# Patient Record
Sex: FEMALE | Race: WHITE | NOT HISPANIC OR LATINO | Employment: UNEMPLOYED | URBAN - METROPOLITAN AREA
[De-identification: names, ages, dates, MRNs, and addresses within clinical notes are randomized per-mention and may not be internally consistent; named-entity substitution may affect disease eponyms.]

---

## 2023-03-07 PROBLEM — H90.A22 SENSORINEURAL HEARING LOSS (SNHL) OF LEFT EAR WITH RESTRICTED HEARING OF RIGHT EAR: Status: ACTIVE | Noted: 2023-03-07

## 2023-03-07 PROBLEM — L65.9 HAIR LOSS: Status: ACTIVE | Noted: 2023-03-07

## 2023-03-07 PROBLEM — G57.11 MERALGIA PARESTHETICA OF RIGHT SIDE: Status: ACTIVE | Noted: 2023-03-07

## 2023-03-07 PROBLEM — M79.10 MUSCLE PAIN: Status: ACTIVE | Noted: 2023-03-07

## 2023-03-07 PROBLEM — E66.9 CLASS 2 OBESITY WITH BODY MASS INDEX (BMI) OF 37.0 TO 37.9 IN ADULT: Status: ACTIVE | Noted: 2023-03-07

## 2023-03-07 PROBLEM — N94.10 DYSPAREUNIA IN FEMALE: Status: ACTIVE | Noted: 2023-03-07

## 2023-03-07 PROBLEM — R19.7 DIARRHEA: Status: ACTIVE | Noted: 2023-03-07

## 2023-03-07 PROBLEM — E66.9 OBESITY, CLASS II, BMI 35-39.9: Status: ACTIVE | Noted: 2023-03-07

## 2023-03-07 PROBLEM — G45.9 TRANSIENT CEREBRAL ISCHEMIA: Status: ACTIVE | Noted: 2023-03-07

## 2023-03-07 PROBLEM — R53.83 FATIGUE: Status: ACTIVE | Noted: 2023-03-07

## 2023-03-07 PROBLEM — N94.6 DYSMENORRHEA: Status: ACTIVE | Noted: 2023-03-07

## 2023-03-07 PROBLEM — R74.8 ELEVATED LIVER ENZYMES: Status: ACTIVE | Noted: 2023-03-07

## 2023-03-07 PROBLEM — M25.461 EFFUSION OF RIGHT KNEE: Status: ACTIVE | Noted: 2023-03-07

## 2023-03-07 PROBLEM — D68.00 VON WILLEBRAND DISEASE (MULTI): Status: ACTIVE | Noted: 2023-03-07

## 2023-03-07 PROBLEM — R63.5 WEIGHT GAIN: Status: ACTIVE | Noted: 2023-03-07

## 2023-03-07 PROBLEM — G47.00 INSOMNIA: Status: ACTIVE | Noted: 2023-03-07

## 2023-03-07 PROBLEM — K76.0 NONALCOHOLIC FATTY LIVER DISEASE: Status: ACTIVE | Noted: 2023-03-07

## 2023-03-07 PROBLEM — R31.0 GROSS HEMATURIA: Status: ACTIVE | Noted: 2023-03-07

## 2023-03-07 PROBLEM — N28.9 KIDNEY DYSFUNCTION: Status: ACTIVE | Noted: 2023-03-07

## 2023-03-07 PROBLEM — H91.91 HEARING LOSS ON RIGHT: Status: ACTIVE | Noted: 2023-03-07

## 2023-03-07 PROBLEM — H90.A31 MIXED CONDUCTIVE AND SENSORINEURAL HEARING LOSS OF RIGHT EAR WITH RESTRICTED HEARING OF LEFT EAR: Status: ACTIVE | Noted: 2023-03-07

## 2023-03-07 PROBLEM — E04.9 ENLARGED THYROID: Status: ACTIVE | Noted: 2023-03-07

## 2023-03-07 PROBLEM — E66.812 CLASS 2 OBESITY WITH BODY MASS INDEX (BMI) OF 37.0 TO 37.9 IN ADULT: Status: ACTIVE | Noted: 2023-03-07

## 2023-03-07 PROBLEM — M22.2X1 PATELLOFEMORAL PAIN SYNDROME OF RIGHT KNEE: Status: ACTIVE | Noted: 2023-03-07

## 2023-03-07 PROBLEM — E28.2 PCOS (POLYCYSTIC OVARIAN SYNDROME): Status: ACTIVE | Noted: 2023-03-07

## 2023-03-07 PROBLEM — E78.5 HYPERLIPIDEMIA: Status: ACTIVE | Noted: 2023-03-07

## 2023-03-07 PROBLEM — E66.9 OBESITY: Status: ACTIVE | Noted: 2023-03-07

## 2023-03-07 PROBLEM — R74.8 ABNORMAL LIVER ENZYMES: Status: ACTIVE | Noted: 2023-03-07

## 2023-03-07 PROBLEM — E66.811 CLASS 1 OBESITY WITH BODY MASS INDEX (BMI) OF 34.0 TO 34.9 IN ADULT: Status: ACTIVE | Noted: 2023-03-07

## 2023-03-07 PROBLEM — S89.91XA INJURY OF RIGHT KNEE: Status: ACTIVE | Noted: 2023-03-07

## 2023-03-07 PROBLEM — M54.16 LUMBAR RADICULOPATHY, ACUTE: Status: ACTIVE | Noted: 2023-03-07

## 2023-03-07 PROBLEM — G43.909 MIGRAINES: Status: ACTIVE | Noted: 2023-03-07

## 2023-03-07 PROBLEM — M54.32 SCIATICA OF LEFT SIDE: Status: ACTIVE | Noted: 2023-03-07

## 2023-03-07 PROBLEM — G62.9 SENSORY NEUROPATHY: Status: ACTIVE | Noted: 2023-03-07

## 2023-03-07 PROBLEM — N92.6 IRREGULAR MENSTRUAL CYCLE: Status: ACTIVE | Noted: 2023-03-07

## 2023-03-07 PROBLEM — E04.1 THYROID NODULE: Status: ACTIVE | Noted: 2023-03-07

## 2023-03-07 PROBLEM — E66.9 CLASS 1 OBESITY WITH BODY MASS INDEX (BMI) OF 34.0 TO 34.9 IN ADULT: Status: ACTIVE | Noted: 2023-03-07

## 2023-03-07 PROBLEM — E66.812 OBESITY, CLASS II, BMI 35-39.9: Status: ACTIVE | Noted: 2023-03-07

## 2023-03-07 PROBLEM — E55.9 VITAMIN D DEFICIENCY: Status: ACTIVE | Noted: 2023-03-07

## 2023-03-07 PROBLEM — R63.4 WEIGHT LOSS: Status: ACTIVE | Noted: 2023-03-07

## 2023-03-07 PROBLEM — R39.89 POSSIBLE URINARY TRACT INFECTION: Status: ACTIVE | Noted: 2023-03-07

## 2023-03-07 PROBLEM — R10.2 PELVIC PAIN: Status: ACTIVE | Noted: 2023-03-07

## 2023-03-07 PROBLEM — G47.33 OSA (OBSTRUCTIVE SLEEP APNEA): Status: ACTIVE | Noted: 2023-03-07

## 2023-03-07 PROBLEM — J34.89 SINUS PRESSURE: Status: ACTIVE | Noted: 2023-03-07

## 2023-03-07 PROBLEM — M25.561 KNEE PAIN, RIGHT: Status: ACTIVE | Noted: 2023-03-07

## 2023-03-07 PROBLEM — H93.13 SUBJECTIVE TINNITUS OF BOTH EARS: Status: ACTIVE | Noted: 2023-03-07

## 2023-03-07 PROBLEM — G57.12 MERALGIA PARESTHETICA, LEFT: Status: ACTIVE | Noted: 2023-03-07

## 2023-03-07 PROBLEM — H92.03 OTALGIA OF BOTH EARS: Status: ACTIVE | Noted: 2023-03-07

## 2023-03-07 PROBLEM — N92.1 MENORRHAGIA WITH IRREGULAR CYCLE: Status: ACTIVE | Noted: 2023-03-07

## 2023-03-07 PROBLEM — R06.83 SNORING: Status: ACTIVE | Noted: 2023-03-07

## 2023-03-07 RX ORDER — NORETHINDRONE 0.35 MG/1
TABLET ORAL
COMMUNITY
Start: 2022-04-04 | End: 2023-07-18 | Stop reason: ALTCHOICE

## 2023-03-07 RX ORDER — MAGNESIUM 200 MG
1 TABLET ORAL NIGHTLY
COMMUNITY
Start: 2021-12-17 | End: 2023-07-18 | Stop reason: ALTCHOICE

## 2023-03-07 RX ORDER — METFORMIN HYDROCHLORIDE 500 MG/1
1 TABLET ORAL 2 TIMES DAILY
COMMUNITY
Start: 2022-03-03 | End: 2023-07-18 | Stop reason: ALTCHOICE

## 2023-03-07 RX ORDER — SUMATRIPTAN SUCCINATE 100 MG/1
TABLET ORAL
COMMUNITY
Start: 2017-11-10 | End: 2023-07-18 | Stop reason: ALTCHOICE

## 2023-03-07 RX ORDER — ALBUTEROL SULFATE 90 UG/1
AEROSOL, METERED RESPIRATORY (INHALATION)
COMMUNITY
Start: 2019-09-19 | End: 2024-03-08 | Stop reason: SDUPTHER

## 2023-03-07 RX ORDER — ERGOCALCIFEROL 1.25 MG/1
1 CAPSULE ORAL
COMMUNITY
Start: 2021-06-28 | End: 2024-01-05 | Stop reason: SDUPTHER

## 2023-03-07 RX ORDER — IBUPROFEN 800 MG/1
TABLET ORAL
COMMUNITY
Start: 2021-12-17 | End: 2023-07-18 | Stop reason: ALTCHOICE

## 2023-03-08 ENCOUNTER — APPOINTMENT (OUTPATIENT)
Dept: PRIMARY CARE | Facility: CLINIC | Age: 33
End: 2023-03-08
Payer: COMMERCIAL

## 2023-03-09 ENCOUNTER — OFFICE VISIT (OUTPATIENT)
Dept: PRIMARY CARE | Facility: CLINIC | Age: 33
End: 2023-03-09
Payer: COMMERCIAL

## 2023-03-09 VITALS
BODY MASS INDEX: 37.17 KG/M2 | HEIGHT: 62 IN | DIASTOLIC BLOOD PRESSURE: 76 MMHG | WEIGHT: 202 LBS | SYSTOLIC BLOOD PRESSURE: 122 MMHG

## 2023-03-09 DIAGNOSIS — R35.0 URINARY FREQUENCY: ICD-10-CM

## 2023-03-09 LAB
BACTERIA, URINE: ABNORMAL /HPF
MUCUS, URINE: ABNORMAL /LPF
POC APPEARANCE, URINE: ABNORMAL
POC BILIRUBIN, URINE: NEGATIVE
POC BLOOD, URINE: ABNORMAL
POC COLOR, URINE: ABNORMAL
POC GLUCOSE, URINE: NEGATIVE MG/DL
POC KETONES, URINE: NEGATIVE MG/DL
POC LEUKOCYTES, URINE: ABNORMAL
POC NITRITE,URINE: NEGATIVE
POC PH, URINE: 6.5 PH
POC PROTEIN, URINE: NEGATIVE MG/DL
POC SPECIFIC GRAVITY, URINE: 1.01
POC UROBILINOGEN, URINE: 2 EU/DL
RBC, URINE: 100 /HPF (ref 0–5)
SQUAMOUS EPITHELIAL CELLS, URINE: 7 /HPF
WBC, URINE: 22 /HPF (ref 0–5)

## 2023-03-09 PROCEDURE — 87186 SC STD MICRODIL/AGAR DIL: CPT

## 2023-03-09 PROCEDURE — 81002 URINALYSIS NONAUTO W/O SCOPE: CPT | Performed by: INTERNAL MEDICINE

## 2023-03-09 PROCEDURE — 81001 URINALYSIS AUTO W/SCOPE: CPT

## 2023-03-09 PROCEDURE — 99213 OFFICE O/P EST LOW 20 MIN: CPT | Performed by: INTERNAL MEDICINE

## 2023-03-09 PROCEDURE — 87086 URINE CULTURE/COLONY COUNT: CPT

## 2023-03-09 PROCEDURE — 87077 CULTURE AEROBIC IDENTIFY: CPT

## 2023-03-09 RX ORDER — CIPROFLOXACIN 500 MG/1
500 TABLET ORAL 2 TIMES DAILY
Qty: 10 TABLET | Refills: 0 | Status: SHIPPED | OUTPATIENT
Start: 2023-03-09 | End: 2023-03-14

## 2023-03-09 ASSESSMENT — ENCOUNTER SYMPTOMS: FREQUENCY: 1

## 2023-03-10 NOTE — PROGRESS NOTES
"Subjective   Patient ID: Breanna Jackson is a 32 y.o. female who presents for Urinary Frequency (Pain/pressure).    Urinary Frequency   Associated symptoms include frequency.   Patient is here with complaints of having urinary frequency and urgency for last 2 days.  Denies any back pain.  She is noticing blood off-and-on but she is also spotting off and on since January        Past recap  Patient is here for presurgical clearance  She is going for right knee arthroscopic surgery  In August she stepped wrong and torn knee ACL     Past medical history: Ureteric stone, wisdom tooth removed, allergies  Social history:  non-smoker nondrinker 4 pregnancies 3 kids  Occupation: Stay home mom  Family history: Mother with von Willebrand's disease ovarian and breast cancer and endometriosis     Review of Systems   Genitourinary:  Positive for frequency.       Objective   /76   Ht 1.575 m (5' 2\")   Wt 91.6 kg (202 lb)   BMI 36.95 kg/m²     Physical Exam  Vitals reviewed.   Constitutional:       Appearance: Normal appearance.   HENT:      Head: Normocephalic and atraumatic.      Right Ear: Tympanic membrane, ear canal and external ear normal.      Left Ear: Tympanic membrane, ear canal and external ear normal.      Nose: Nose normal.      Mouth/Throat:      Pharynx: Oropharynx is clear.   Eyes:      Extraocular Movements: Extraocular movements intact.      Conjunctiva/sclera: Conjunctivae normal.      Pupils: Pupils are equal, round, and reactive to light.   Cardiovascular:      Rate and Rhythm: Normal rate and regular rhythm.      Pulses: Normal pulses.      Heart sounds: Normal heart sounds.   Pulmonary:      Effort: Pulmonary effort is normal.      Breath sounds: Normal breath sounds.   Abdominal:      General: Abdomen is flat. Bowel sounds are normal.      Palpations: Abdomen is soft.   Musculoskeletal:      Cervical back: Normal range of motion and neck supple.   Skin:     General: Skin is warm and dry. "   Neurological:      General: No focal deficit present.      Mental Status: She is alert and oriented to person, place, and time.   Psychiatric:         Mood and Affect: Mood normal.         Assessment/Plan   Problem List Items Addressed This Visit    None  Visit Diagnoses       Urinary frequency        Relevant Medications    ciprofloxacin (Cipro) 500 mg tablet    Other Relevant Orders    POCT UA (nonautomated) manually resulted (Completed)    Urinalysis with Reflex Microscopic and Culture          Urine analysis shows signs of UTI  We will treat with Cipro while waiting for culture  If menstrual irregularities are not better advised to follow-up with OB/GYN

## 2023-03-11 LAB — URINE CULTURE: ABNORMAL

## 2023-07-18 ENCOUNTER — OFFICE VISIT (OUTPATIENT)
Dept: PRIMARY CARE | Facility: CLINIC | Age: 33
End: 2023-07-18
Payer: COMMERCIAL

## 2023-07-18 VITALS — WEIGHT: 202 LBS | BODY MASS INDEX: 37.17 KG/M2 | HEIGHT: 62 IN

## 2023-07-18 DIAGNOSIS — E55.9 VITAMIN D DEFICIENCY: ICD-10-CM

## 2023-07-18 DIAGNOSIS — E04.9 ENLARGED THYROID: ICD-10-CM

## 2023-07-18 DIAGNOSIS — Z00.00 ROUTINE HEALTH MAINTENANCE: ICD-10-CM

## 2023-07-18 DIAGNOSIS — E78.49 OTHER HYPERLIPIDEMIA: ICD-10-CM

## 2023-07-18 PROCEDURE — 99395 PREV VISIT EST AGE 18-39: CPT | Performed by: INTERNAL MEDICINE

## 2023-07-18 RX ORDER — NORETHINDRONE 0.35 MG/1
1 TABLET ORAL DAILY
Qty: 28 TABLET | Refills: 2 | Status: SHIPPED | OUTPATIENT
Start: 2023-07-18 | End: 2023-10-04 | Stop reason: SDUPTHER

## 2023-07-18 NOTE — PROGRESS NOTES
"Subjective   Patient ID: Breanna Jackson is a 33 y.o. female who presents for cpe.    HPI   Patient is here for physical  Also would like birth control prescription until she can see the OB/GYN  She was recommended to ice reviewed the for heavy menstrual cycle but patient is not willing to do it     past recap  Patient is here for presurgical clearance  She is going for right knee arthroscopic surgery  In August she stepped wrong and torn knee ACL     Past medical history: Ureteric stone, wisdom tooth removed, allergies  Social history:  non-smoker nondrinker 4 pregnancies 3 kids  Occupation: Stay home mom  Family history: Mother with von Willebrand's disease ovarian and breast cancer and endometriosis   Review of Systems    Objective   Ht 1.575 m (5' 2\")   Wt 91.6 kg (202 lb)   BMI 36.95 kg/m²     Physical Exam  Vitals reviewed.   Constitutional:       Appearance: Normal appearance.   HENT:      Head: Normocephalic and atraumatic.      Right Ear: Tympanic membrane, ear canal and external ear normal.      Left Ear: Tympanic membrane, ear canal and external ear normal.      Nose: Nose normal.      Mouth/Throat:      Pharynx: Oropharynx is clear.   Eyes:      Extraocular Movements: Extraocular movements intact.      Conjunctiva/sclera: Conjunctivae normal.      Pupils: Pupils are equal, round, and reactive to light.   Cardiovascular:      Rate and Rhythm: Normal rate and regular rhythm.      Pulses: Normal pulses.      Heart sounds: Normal heart sounds.   Pulmonary:      Effort: Pulmonary effort is normal.      Breath sounds: Normal breath sounds.   Abdominal:      General: Abdomen is flat. Bowel sounds are normal.      Palpations: Abdomen is soft.   Musculoskeletal:      Cervical back: Normal range of motion and neck supple.   Skin:     General: Skin is warm and dry.   Neurological:      General: No focal deficit present.      Mental Status: She is alert and oriented to person, place, and time.   Psychiatric:  "        Mood and Affect: Mood normal.         Assessment/Plan   Problem List Items Addressed This Visit          Cardiac and Vasculature    Hyperlipidemia    Relevant Orders    CBC    Comprehensive Metabolic Panel    Lipid Panel    Thyroid Stimulating Hormone    Vitamin D, Total       Endocrine/Metabolic    Enlarged thyroid    Relevant Orders    CBC    Comprehensive Metabolic Panel    Lipid Panel    Thyroid Stimulating Hormone    Vitamin D, Total    Vitamin D deficiency    Relevant Orders    CBC    Comprehensive Metabolic Panel    Lipid Panel    Thyroid Stimulating Hormone    Vitamin D, Total     Other Visit Diagnoses       Routine health maintenance        Relevant Medications    norethindrone (Makenzie) 0.35 mg tablet    Other Relevant Orders    CBC    Comprehensive Metabolic Panel    Lipid Panel    Thyroid Stimulating Hormone    Vitamin D, Total        Physical normal  Routine blood work ordered  OB/GYN notes reviewed  Norethindrone 0.35 mg p.o. daily 3-month supply

## 2023-10-04 DIAGNOSIS — Z00.00 ROUTINE HEALTH MAINTENANCE: ICD-10-CM

## 2023-10-04 RX ORDER — NORETHINDRONE 0.35 MG/1
1 TABLET ORAL DAILY
Qty: 84 TABLET | Refills: 0 | Status: SHIPPED | OUTPATIENT
Start: 2023-10-04 | End: 2024-01-02 | Stop reason: SDUPTHER

## 2023-10-23 ENCOUNTER — APPOINTMENT (OUTPATIENT)
Dept: OBSTETRICS AND GYNECOLOGY | Facility: CLINIC | Age: 33
End: 2023-10-23
Payer: COMMERCIAL

## 2023-12-27 ENCOUNTER — TELEPHONE (OUTPATIENT)
Dept: OBSTETRICS AND GYNECOLOGY | Facility: CLINIC | Age: 33
End: 2023-12-27
Payer: COMMERCIAL

## 2023-12-27 DIAGNOSIS — Z00.00 ROUTINE HEALTH MAINTENANCE: ICD-10-CM

## 2024-01-02 RX ORDER — NORETHINDRONE 0.35 MG/1
1 TABLET ORAL DAILY
Qty: 84 TABLET | Refills: 0 | Status: SHIPPED | OUTPATIENT
Start: 2024-01-02 | End: 2024-01-25 | Stop reason: SDUPTHER

## 2024-01-05 ENCOUNTER — OFFICE VISIT (OUTPATIENT)
Dept: PRIMARY CARE | Facility: CLINIC | Age: 34
End: 2024-01-05
Payer: MEDICAID

## 2024-01-05 VITALS
HEIGHT: 62 IN | WEIGHT: 187 LBS | DIASTOLIC BLOOD PRESSURE: 78 MMHG | SYSTOLIC BLOOD PRESSURE: 116 MMHG | BODY MASS INDEX: 34.41 KG/M2

## 2024-01-05 DIAGNOSIS — E55.9 VITAMIN D DEFICIENCY: ICD-10-CM

## 2024-01-05 DIAGNOSIS — N92.1 MENORRHAGIA WITH IRREGULAR CYCLE: Primary | ICD-10-CM

## 2024-01-05 PROCEDURE — 1036F TOBACCO NON-USER: CPT | Performed by: INTERNAL MEDICINE

## 2024-01-05 PROCEDURE — 99213 OFFICE O/P EST LOW 20 MIN: CPT | Performed by: INTERNAL MEDICINE

## 2024-01-05 RX ORDER — ERGOCALCIFEROL 1.25 MG/1
1 CAPSULE ORAL
Qty: 12 CAPSULE | Refills: 0 | Status: SHIPPED | OUTPATIENT
Start: 2024-01-05 | End: 2024-01-15 | Stop reason: SDUPTHER

## 2024-01-05 ASSESSMENT — ENCOUNTER SYMPTOMS
OCCASIONAL FEELINGS OF UNSTEADINESS: 0
LOSS OF SENSATION IN FEET: 0
DEPRESSION: 0

## 2024-01-05 NOTE — PROGRESS NOTES
"Subjective   Patient ID: Breanna Jackson is a 33 y.o. female who presents for No chief complaint on file..    HPI   Patient is here for follow-up.  Did not do blood work.  Needs refill on controlled     patient is here for physical  Also would like birth control prescription until she can see the OB/GYN  She was recommended to do uterine ablation the for heavy menstrual cycle but patient is not willing to do it     past recap  Patient is here for presurgical clearance  She is going for right knee arthroscopic surgery  In August she stepped wrong and torn knee ACL     Past medical history: Ureteric stone, wisdom tooth removed, allergies  Social history:  non-smoker nondrinker 4 pregnancies 3 kids  Occupation: Stay home mom  Family history: Mother with von Willebrand's disease ovarian and breast cancer and endometriosis   Review of Systems    Objective   /78   Ht 1.575 m (5' 2\")   Wt 84.8 kg (187 lb)   BMI 34.20 kg/m²     Physical Exam  Vitals reviewed.   Constitutional:       Appearance: Normal appearance.   HENT:      Head: Normocephalic and atraumatic.      Right Ear: Tympanic membrane, ear canal and external ear normal.      Left Ear: Tympanic membrane, ear canal and external ear normal.      Nose: Nose normal.      Mouth/Throat:      Pharynx: Oropharynx is clear.   Eyes:      Extraocular Movements: Extraocular movements intact.      Conjunctiva/sclera: Conjunctivae normal.      Pupils: Pupils are equal, round, and reactive to light.   Cardiovascular:      Rate and Rhythm: Normal rate and regular rhythm.      Pulses: Normal pulses.      Heart sounds: Normal heart sounds.   Pulmonary:      Effort: Pulmonary effort is normal.      Breath sounds: Normal breath sounds.   Abdominal:      General: Abdomen is flat. Bowel sounds are normal.      Palpations: Abdomen is soft.   Musculoskeletal:      Cervical back: Normal range of motion and neck supple.   Skin:     General: Skin is warm and dry. "   Neurological:      General: No focal deficit present.      Mental Status: She is alert and oriented to person, place, and time.   Psychiatric:         Mood and Affect: Mood normal.         Assessment/Plan   Problem List Items Addressed This Visit          Endocrine/Metabolic    Vitamin D deficiency    Relevant Medications    ergocalciferol (Vitamin D-2) 1.25 MG (97773 UT) capsule       Genitourinary and Reproductive    Menorrhagia with irregular cycle - Primary   Past recap  physical normal  Routine blood work ordered  OB/GYN notes reviewed  Norethindrone 0.35 mg p.o. daily 3-month supply    1/5/2024  Blood work patient has not done yet blood work ordered  Vitamin D prescription based on results  Refills given on birth control

## 2024-01-06 ENCOUNTER — LAB (OUTPATIENT)
Dept: LAB | Facility: LAB | Age: 34
End: 2024-01-06
Payer: MEDICAID

## 2024-01-06 DIAGNOSIS — Z00.00 ROUTINE HEALTH MAINTENANCE: ICD-10-CM

## 2024-01-06 DIAGNOSIS — E55.9 VITAMIN D DEFICIENCY: ICD-10-CM

## 2024-01-06 DIAGNOSIS — E78.49 OTHER HYPERLIPIDEMIA: ICD-10-CM

## 2024-01-06 DIAGNOSIS — E04.9 ENLARGED THYROID: ICD-10-CM

## 2024-01-06 LAB
25(OH)D3 SERPL-MCNC: 22 NG/ML (ref 30–100)
ALBUMIN SERPL BCP-MCNC: 4.6 G/DL (ref 3.4–5)
ALP SERPL-CCNC: 58 U/L (ref 33–110)
ALT SERPL W P-5'-P-CCNC: 20 U/L (ref 7–45)
ANION GAP SERPL CALC-SCNC: 12 MMOL/L (ref 10–20)
AST SERPL W P-5'-P-CCNC: 17 U/L (ref 9–39)
BILIRUB SERPL-MCNC: 0.6 MG/DL (ref 0–1.2)
BUN SERPL-MCNC: 10 MG/DL (ref 6–23)
CALCIUM SERPL-MCNC: 9.8 MG/DL (ref 8.6–10.6)
CHLORIDE SERPL-SCNC: 103 MMOL/L (ref 98–107)
CHOLEST SERPL-MCNC: 201 MG/DL (ref 0–199)
CHOLESTEROL/HDL RATIO: 4.7
CO2 SERPL-SCNC: 28 MMOL/L (ref 21–32)
CREAT SERPL-MCNC: 0.89 MG/DL (ref 0.5–1.05)
ERYTHROCYTE [DISTWIDTH] IN BLOOD BY AUTOMATED COUNT: 12.8 % (ref 11.5–14.5)
GFR SERPL CREATININE-BSD FRML MDRD: 88 ML/MIN/1.73M*2
GLUCOSE SERPL-MCNC: 100 MG/DL (ref 74–99)
HCT VFR BLD AUTO: 44.3 % (ref 36–46)
HDLC SERPL-MCNC: 42.4 MG/DL
HGB BLD-MCNC: 14.4 G/DL (ref 12–16)
LDLC SERPL CALC-MCNC: 126 MG/DL
MCH RBC QN AUTO: 28.3 PG (ref 26–34)
MCHC RBC AUTO-ENTMCNC: 32.5 G/DL (ref 32–36)
MCV RBC AUTO: 87 FL (ref 80–100)
NON HDL CHOLESTEROL: 159 MG/DL (ref 0–149)
NRBC BLD-RTO: 0 /100 WBCS (ref 0–0)
PLATELET # BLD AUTO: 291 X10*3/UL (ref 150–450)
POTASSIUM SERPL-SCNC: 4.2 MMOL/L (ref 3.5–5.3)
PROT SERPL-MCNC: 7.4 G/DL (ref 6.4–8.2)
RBC # BLD AUTO: 5.09 X10*6/UL (ref 4–5.2)
SODIUM SERPL-SCNC: 139 MMOL/L (ref 136–145)
TRIGL SERPL-MCNC: 162 MG/DL (ref 0–149)
TSH SERPL-ACNC: 2.65 MIU/L (ref 0.44–3.98)
VLDL: 32 MG/DL (ref 0–40)
WBC # BLD AUTO: 5.9 X10*3/UL (ref 4.4–11.3)

## 2024-01-06 PROCEDURE — 36415 COLL VENOUS BLD VENIPUNCTURE: CPT

## 2024-01-06 PROCEDURE — 84443 ASSAY THYROID STIM HORMONE: CPT

## 2024-01-06 PROCEDURE — 80053 COMPREHEN METABOLIC PANEL: CPT

## 2024-01-06 PROCEDURE — 80061 LIPID PANEL: CPT

## 2024-01-06 PROCEDURE — 85027 COMPLETE CBC AUTOMATED: CPT

## 2024-01-06 PROCEDURE — 82306 VITAMIN D 25 HYDROXY: CPT

## 2024-01-15 ENCOUNTER — OFFICE VISIT (OUTPATIENT)
Dept: PRIMARY CARE | Facility: CLINIC | Age: 34
End: 2024-01-15
Payer: MEDICAID

## 2024-01-15 VITALS
DIASTOLIC BLOOD PRESSURE: 78 MMHG | WEIGHT: 187 LBS | SYSTOLIC BLOOD PRESSURE: 116 MMHG | HEIGHT: 62 IN | BODY MASS INDEX: 34.41 KG/M2

## 2024-01-15 DIAGNOSIS — E78.5 HYPERLIPIDEMIA, UNSPECIFIED HYPERLIPIDEMIA TYPE: ICD-10-CM

## 2024-01-15 DIAGNOSIS — E55.9 VITAMIN D DEFICIENCY: ICD-10-CM

## 2024-01-15 PROCEDURE — 99213 OFFICE O/P EST LOW 20 MIN: CPT | Performed by: INTERNAL MEDICINE

## 2024-01-15 PROCEDURE — 1036F TOBACCO NON-USER: CPT | Performed by: INTERNAL MEDICINE

## 2024-01-15 RX ORDER — ERGOCALCIFEROL 1.25 MG/1
1 CAPSULE ORAL
Qty: 4 CAPSULE | Refills: 11 | Status: SHIPPED | OUTPATIENT
Start: 2024-01-15

## 2024-01-15 NOTE — PROGRESS NOTES
"Subjective   Patient ID: Breanna Jackson is a 33 y.o. female who presents for Follow-up (results).    HPI   Patient is here for follow-up on blood work  Follow-up: Vitamin D refills     patient is here for follow-up.  Did not do blood work.  Needs refill on controlled     patient is here for physical  Also would like birth control prescription until she can see the OB/GYN  She was recommended to do uterine ablation the for heavy menstrual cycle but patient is not willing to do it     past recap  Patient is here for presurgical clearance  She is going for right knee arthroscopic surgery  In August she stepped wrong and torn knee ACL     Past medical history: Ureteric stone, wisdom tooth removed, allergies  Social history:  non-smoker nondrinker 4 pregnancies 3 kids  Occupation: Stay home mom  Family history: Mother with von Willebrand's disease ovarian and breast cancer and endometriosis ,a fib, dont know fatherbrian  of MI young  Review of Systems    Objective   /78   Ht 1.575 m (5' 2\")   Wt 84.8 kg (187 lb)   BMI 34.20 kg/m²     Physical Exam  Vitals reviewed.   Constitutional:       Appearance: Normal appearance.   HENT:      Head: Normocephalic and atraumatic.      Right Ear: Tympanic membrane, ear canal and external ear normal.      Left Ear: Tympanic membrane, ear canal and external ear normal.      Nose: Nose normal.      Mouth/Throat:      Pharynx: Oropharynx is clear.   Eyes:      Extraocular Movements: Extraocular movements intact.      Conjunctiva/sclera: Conjunctivae normal.      Pupils: Pupils are equal, round, and reactive to light.   Cardiovascular:      Rate and Rhythm: Normal rate and regular rhythm.      Pulses: Normal pulses.      Heart sounds: Normal heart sounds.   Pulmonary:      Effort: Pulmonary effort is normal.      Breath sounds: Normal breath sounds.   Abdominal:      General: Abdomen is flat. Bowel sounds are normal.      Palpations: Abdomen is soft. "   Musculoskeletal:      Cervical back: Normal range of motion and neck supple.   Skin:     General: Skin is warm and dry.   Neurological:      General: No focal deficit present.      Mental Status: She is alert and oriented to person, place, and time.   Psychiatric:         Mood and Affect: Mood normal.       Assessment/Plan   Problem List Items Addressed This Visit    None  Past recap  physical normal  Routine blood work ordered  OB/GYN notes reviewed  Norethindrone 0.35 mg p.o. daily 3-month supply    1/5/2024  Blood work patient has not done yet blood work ordered  Vitamin D prescription based on results  Refills given on birth control    1/15/2024  Blood work reviewed  Vitamin D still low at  50,000 q. weekly for 1 year  Cholesterol has gone up  Discussed diet and exercise  Patient wants to try that before going on medication  Patient could be having issues with his family history making his cholesterol go up  With still high  Will consider statin  Follow-up blood work in 6 months

## 2024-01-25 ENCOUNTER — OFFICE VISIT (OUTPATIENT)
Dept: OBSTETRICS AND GYNECOLOGY | Facility: CLINIC | Age: 34
End: 2024-01-25
Payer: MEDICAID

## 2024-01-25 VITALS
BODY MASS INDEX: 34.23 KG/M2 | SYSTOLIC BLOOD PRESSURE: 124 MMHG | WEIGHT: 186 LBS | DIASTOLIC BLOOD PRESSURE: 78 MMHG | HEIGHT: 62 IN

## 2024-01-25 DIAGNOSIS — Z01.419 WELL WOMAN EXAM: Primary | ICD-10-CM

## 2024-01-25 DIAGNOSIS — Z11.3 ROUTINE SCREENING FOR STI (SEXUALLY TRANSMITTED INFECTION): ICD-10-CM

## 2024-01-25 DIAGNOSIS — Z30.41 SURVEILLANCE OF PREVIOUSLY PRESCRIBED CONTRACEPTIVE PILL: ICD-10-CM

## 2024-01-25 DIAGNOSIS — Z01.419 PAP TEST, AS PART OF ROUTINE GYNECOLOGICAL EXAMINATION: ICD-10-CM

## 2024-01-25 PROCEDURE — 87800 DETECT AGNT MULT DNA DIREC: CPT

## 2024-01-25 PROCEDURE — 88175 CYTOPATH C/V AUTO FLUID REDO: CPT

## 2024-01-25 PROCEDURE — 88141 CYTOPATH C/V INTERPRET: CPT | Performed by: PATHOLOGY

## 2024-01-25 PROCEDURE — 1036F TOBACCO NON-USER: CPT | Performed by: MIDWIFE

## 2024-01-25 PROCEDURE — 99395 PREV VISIT EST AGE 18-39: CPT | Performed by: MIDWIFE

## 2024-01-25 RX ORDER — NORETHINDRONE 0.35 MG/1
1 TABLET ORAL DAILY
Qty: 84 TABLET | Refills: 3 | Status: SHIPPED | OUTPATIENT
Start: 2024-01-25 | End: 2025-01-24

## 2024-01-25 NOTE — PROGRESS NOTES
Subjective   Patient ID: Breanna Jackson is a 33 y.o. female who presents for Annual Exam. And to renew POP that pt has been happy with overall, she says menses have been much lighter since POP use, but she sometimes has heavier or longer menses usually the month after no menses with POP  HPI  PMHx: last pap 2019 NIL; h/o migraines with AURA, PCOS  SocHx: with partner 7 mos condoms used sometimes  ROS: no pelvic pain, no urinary c/o, NAD  Review of Systems   All other systems reviewed and are negative.      Objective   Physical Exam  Chest:   Breasts:     Right: Normal.      Left: Normal.   Genitourinary:     General: Normal vulva.      Vagina: Bleeding present.      Comments: Pap obtained        Assessment/Plan   Diagnoses and all orders for this visit:  Well woman exam  Pap test, as part of routine gynecological examination  -     THINPREP PAP TEST  Surveillance of previously prescribed contraceptive pill  -     norethindrone (Makenzie) 0.35 mg tablet; Take 1 tablet (0.35 mg) by mouth once daily.  Routine screening for STI (sexually transmitted infection)  -     C. Trachomatis / N. Gonorrhoeae, Amplified Detection       Reassurance given re exam findings and common bleeding patterns with use of POP.  We discussed option of Mirena as this will likely lessen menstrual frequency.  Pt will think about this option but desires to remain on POP for now.  RTO AE/PRN    MIKE Ochoa, ND 01/25/24 10:56 AM

## 2024-01-26 LAB
C TRACH RRNA SPEC QL NAA+PROBE: NEGATIVE
N GONORRHOEA DNA SPEC QL PROBE+SIG AMP: NEGATIVE

## 2024-02-08 LAB
CYTOLOGY CMNT CVX/VAG CYTO-IMP: NORMAL
LAB AP HPV GENOTYPE QUESTION: YES
LAB AP HPV HR: NORMAL
LABORATORY COMMENT REPORT: NORMAL
LMP START DATE: NORMAL
PATH REPORT.TOTAL CANCER: NORMAL

## 2024-02-21 ENCOUNTER — OFFICE VISIT (OUTPATIENT)
Dept: OBSTETRICS AND GYNECOLOGY | Facility: CLINIC | Age: 34
End: 2024-02-21
Payer: MEDICAID

## 2024-02-21 VITALS
WEIGHT: 186 LBS | HEIGHT: 62 IN | SYSTOLIC BLOOD PRESSURE: 116 MMHG | DIASTOLIC BLOOD PRESSURE: 78 MMHG | BODY MASS INDEX: 34.23 KG/M2

## 2024-02-21 DIAGNOSIS — R87.612 LGSIL ON PAP SMEAR OF CERVIX: Primary | ICD-10-CM

## 2024-02-21 PROCEDURE — 87624 HPV HI-RISK TYP POOLED RSLT: CPT

## 2024-02-21 PROCEDURE — 88175 CYTOPATH C/V AUTO FLUID REDO: CPT

## 2024-02-21 PROCEDURE — 57456 ENDOCERV CURETTAGE W/SCOPE: CPT | Performed by: OBSTETRICS & GYNECOLOGY

## 2024-02-21 PROCEDURE — 1036F TOBACCO NON-USER: CPT | Performed by: OBSTETRICS & GYNECOLOGY

## 2024-02-21 NOTE — PROGRESS NOTES
Patient ID: Breanna Jackson is a 33 y.o. female.    Colposcopy    Date/Time: 2024 9:35 AM    Performed by: Presley Wise MD  Authorized by: Presley Wise MD    Procedure location: cervix    Consent:     Patient questions answered: yes      Risks and benefits of the procedure and its alternatives discussed: yes      Consent obtained:  Verbal    Consent given by:  Patient  Indication:     Cervical indication(s): cervical LSIL    Pre-procedure:     Prep solution(s): acetic acid    Procedure:     Colposcopy with: endocervical curettage      Cervix visibility: fully visualized      SCJ visibility: fully visualized    Post-procedure:     Patient tolerance of procedure:  Patient tolerated the procedure well with no immediate complications  Comments:      Normal colposcopy.  Endocervical curetting sent.  .  3 vaginal deliveries.  Oral contraceptives.  Boyfriend.

## 2024-03-08 DIAGNOSIS — R05.1 ACUTE COUGH: ICD-10-CM

## 2024-03-08 RX ORDER — ALBUTEROL SULFATE 90 UG/1
2 AEROSOL, METERED RESPIRATORY (INHALATION)
Qty: 36 G | Refills: 1 | Status: SHIPPED | OUTPATIENT
Start: 2024-03-08

## 2024-03-11 LAB
CYTOLOGY CMNT CVX/VAG CYTO-IMP: NORMAL
HPV HR 12 DNA GENITAL QL NAA+PROBE: NEGATIVE
HPV HR GENOTYPES PNL CVX NAA+PROBE: NEGATIVE
HPV16 DNA SPEC QL NAA+PROBE: NEGATIVE
HPV18 DNA SPEC QL NAA+PROBE: NEGATIVE
LAB AP HPV GENOTYPE QUESTION: YES
LAB AP HPV HR: NORMAL
LABORATORY COMMENT REPORT: NORMAL
PATH REPORT.TOTAL CANCER: NORMAL

## 2024-10-07 ENCOUNTER — LAB (OUTPATIENT)
Dept: LAB | Facility: LAB | Age: 34
End: 2024-10-07
Payer: MEDICAID

## 2024-10-07 ENCOUNTER — APPOINTMENT (OUTPATIENT)
Dept: OBSTETRICS AND GYNECOLOGY | Facility: CLINIC | Age: 34
End: 2024-10-07
Payer: MEDICAID

## 2024-10-07 VITALS
DIASTOLIC BLOOD PRESSURE: 84 MMHG | BODY MASS INDEX: 36.99 KG/M2 | HEIGHT: 62 IN | SYSTOLIC BLOOD PRESSURE: 122 MMHG | WEIGHT: 201 LBS

## 2024-10-07 DIAGNOSIS — N92.6 IRREGULAR MENSTRUAL CYCLE: Primary | ICD-10-CM

## 2024-10-07 DIAGNOSIS — N92.6 IRREGULAR MENSTRUAL CYCLE: ICD-10-CM

## 2024-10-07 PROCEDURE — 36415 COLL VENOUS BLD VENIPUNCTURE: CPT

## 2024-10-07 PROCEDURE — 84146 ASSAY OF PROLACTIN: CPT

## 2024-10-07 PROCEDURE — 1036F TOBACCO NON-USER: CPT | Performed by: MIDWIFE

## 2024-10-07 PROCEDURE — 99213 OFFICE O/P EST LOW 20 MIN: CPT | Performed by: MIDWIFE

## 2024-10-07 PROCEDURE — 83002 ASSAY OF GONADOTROPIN (LH): CPT

## 2024-10-07 PROCEDURE — 83001 ASSAY OF GONADOTROPIN (FSH): CPT

## 2024-10-07 PROCEDURE — 84402 ASSAY OF FREE TESTOSTERONE: CPT

## 2024-10-07 PROCEDURE — 3008F BODY MASS INDEX DOCD: CPT | Performed by: MIDWIFE

## 2024-10-07 NOTE — PROGRESS NOTES
Subjective   Patient ID: Breanna Jackson is a 34 y.o. female who presents for Consult.Pt with questions about PCOS; she says she was advised to FU with us for PCOS by her PCP. Pt says she does not have excessive body hair, but she has always had irregular menses-- when not on OCP periods are dimple 3-4 times a year; and she has had a lot of trouble with losing weight. LMP 9/2024    HPI  PMHx: colpo for LSIL 2/2024; h/o PCOS and migraine with AURA; pt stopped taking POP 8/2024 d/t BTB; TSH 1/2024 WNL  SocHx: with partner >1 yr; pt says she has multiple food allergies that limit her diet  ROS: no pelvic pain, no urinary c/o, NAD  Review of Systems    Objective   Physical Exam  Constitutional:       Appearance: She is obese.   HENT:      Head: Normocephalic.   Pulmonary:      Effort: Pulmonary effort is normal.   Neurological:      Mental Status: She is alert.   Psychiatric:         Behavior: Behavior normal.         Thought Content: Thought content normal.         Assessment/Plan   Diagnoses and all orders for this visit:  Irregular menstrual cycle  -     Follicle Stimulating Hormone; Future  -     Luteinizing Hormone; Future  -     Prolactin; Future  -     Testosterone,Free and Total; Future    PCOS etiology and link to insulin resistance discussed; wt loss lifestyle strategies discussed and pt encouraged to incorporate these into her life  FU with PCP for help with wt loss if these measures not helpful.       SOUMYA Ochoa-ANGELES, ND 10/07/24 2:50 PM

## 2024-10-08 LAB
FSH SERPL-ACNC: 2.2 IU/L
LH SERPL-ACNC: 3.4 IU/L
PROLACTIN SERPL-MCNC: 4.5 UG/L (ref 3–20)

## 2024-10-11 LAB
TESTOSTERONE FREE (CHAN): 4.8 PG/ML (ref 0.1–6.4)
TESTOSTERONE,TOTAL,LC-MS/MS: 31 NG/DL (ref 2–45)

## 2025-01-09 ENCOUNTER — LAB (OUTPATIENT)
Dept: LAB | Facility: LAB | Age: 35
End: 2025-01-09
Payer: MEDICAID

## 2025-01-09 DIAGNOSIS — E55.9 VITAMIN D DEFICIENCY: ICD-10-CM

## 2025-01-09 DIAGNOSIS — Z32.01 PREGNANCY TEST POSITIVE (HHS-HCC): ICD-10-CM

## 2025-01-09 DIAGNOSIS — E78.5 HYPERLIPIDEMIA, UNSPECIFIED HYPERLIPIDEMIA TYPE: ICD-10-CM

## 2025-01-09 LAB
25(OH)D3 SERPL-MCNC: 62 NG/ML (ref 30–100)
ABO GROUP (TYPE) IN BLOOD: NORMAL
ANTIBODY SCREEN: NORMAL
CHOLEST SERPL-MCNC: 186 MG/DL (ref 0–199)
CHOLEST/HDLC SERPL: 3.9 {RATIO}
ERYTHROCYTE [DISTWIDTH] IN BLOOD BY AUTOMATED COUNT: 12.8 % (ref 11.5–14.5)
EST. AVERAGE GLUCOSE BLD GHB EST-MCNC: 88 MG/DL
HBA1C MFR BLD: 4.7 %
HBV SURFACE AG SERPL QL IA: NONREACTIVE
HCT VFR BLD AUTO: 41.5 % (ref 36–46)
HCV AB SER QL: NONREACTIVE
HDLC SERPL-MCNC: 47.5 MG/DL
HGB BLD-MCNC: 14.2 G/DL (ref 12–16)
HIV 1+2 AB+HIV1 P24 AG SERPL QL IA: NONREACTIVE
LDLC SERPL CALC-MCNC: 112 MG/DL
MCH RBC QN AUTO: 29.6 PG (ref 26–34)
MCHC RBC AUTO-ENTMCNC: 34.2 G/DL (ref 32–36)
MCV RBC AUTO: 87 FL (ref 80–100)
NON HDL CHOLESTEROL: 139 MG/DL (ref 0–149)
NRBC BLD-RTO: 0 /100 WBCS (ref 0–0)
PLATELET # BLD AUTO: 309 X10*3/UL (ref 150–450)
RBC # BLD AUTO: 4.79 X10*6/UL (ref 4–5.2)
REFLEX ADDED, ANEMIA PANEL: NORMAL
RH FACTOR (ANTIGEN D): NORMAL
RUBV IGG SERPL IA-ACNC: 1.3 IA
RUBV IGG SERPL QL IA: POSITIVE
TREPONEMA PALLIDUM IGG+IGM AB [PRESENCE] IN SERUM OR PLASMA BY IMMUNOASSAY: NONREACTIVE
TRIGL SERPL-MCNC: 132 MG/DL (ref 0–149)
VLDL: 26 MG/DL (ref 0–40)
WBC # BLD AUTO: 5.8 X10*3/UL (ref 4.4–11.3)

## 2025-01-09 PROCEDURE — 86901 BLOOD TYPING SEROLOGIC RH(D): CPT

## 2025-01-09 PROCEDURE — 86803 HEPATITIS C AB TEST: CPT

## 2025-01-09 PROCEDURE — 85027 COMPLETE CBC AUTOMATED: CPT

## 2025-01-09 PROCEDURE — 83036 HEMOGLOBIN GLYCOSYLATED A1C: CPT

## 2025-01-09 PROCEDURE — 80061 LIPID PANEL: CPT

## 2025-01-09 PROCEDURE — 86780 TREPONEMA PALLIDUM: CPT

## 2025-01-09 PROCEDURE — 87389 HIV-1 AG W/HIV-1&-2 AB AG IA: CPT

## 2025-01-09 PROCEDURE — 82306 VITAMIN D 25 HYDROXY: CPT

## 2025-01-09 PROCEDURE — 87340 HEPATITIS B SURFACE AG IA: CPT

## 2025-01-09 PROCEDURE — 86317 IMMUNOASSAY INFECTIOUS AGENT: CPT

## 2025-01-09 PROCEDURE — 86850 RBC ANTIBODY SCREEN: CPT

## 2025-01-09 PROCEDURE — 86900 BLOOD TYPING SEROLOGIC ABO: CPT

## 2025-01-15 ENCOUNTER — APPOINTMENT (OUTPATIENT)
Dept: OBSTETRICS AND GYNECOLOGY | Facility: CLINIC | Age: 35
End: 2025-01-15
Payer: MEDICAID

## 2025-01-15 VITALS
SYSTOLIC BLOOD PRESSURE: 122 MMHG | DIASTOLIC BLOOD PRESSURE: 72 MMHG | BODY MASS INDEX: 36.8 KG/M2 | HEIGHT: 62 IN | WEIGHT: 200 LBS

## 2025-01-15 DIAGNOSIS — Z32.01 PREGNANCY TEST POSITIVE (HHS-HCC): ICD-10-CM

## 2025-01-15 DIAGNOSIS — O36.80X0 PREGNANCY WITH INCONCLUSIVE FETAL VIABILITY, SINGLE OR UNSPECIFIED FETUS: Primary | ICD-10-CM

## 2025-01-15 DIAGNOSIS — O99.210 OBESITY IN PREGNANCY (HHS-HCC): ICD-10-CM

## 2025-01-15 DIAGNOSIS — N91.2 AMENORRHEA: ICD-10-CM

## 2025-01-15 LAB
POC BLOOD, URINE: NEGATIVE
POC GLUCOSE, URINE: NEGATIVE MG/DL
POC LEUKOCYTES, URINE: NEGATIVE
POC NITRITE,URINE: NEGATIVE
POC PROTEIN, URINE: NEGATIVE MG/DL
PREGNANCY TEST URINE, POC: POSITIVE

## 2025-01-15 PROCEDURE — 99214 OFFICE O/P EST MOD 30 MIN: CPT | Performed by: OBSTETRICS & GYNECOLOGY

## 2025-01-15 PROCEDURE — 1036F TOBACCO NON-USER: CPT | Performed by: OBSTETRICS & GYNECOLOGY

## 2025-01-15 PROCEDURE — 3008F BODY MASS INDEX DOCD: CPT | Performed by: OBSTETRICS & GYNECOLOGY

## 2025-01-15 PROCEDURE — 81025 URINE PREGNANCY TEST: CPT | Performed by: OBSTETRICS & GYNECOLOGY

## 2025-01-15 PROCEDURE — 76830 TRANSVAGINAL US NON-OB: CPT | Performed by: OBSTETRICS & GYNECOLOGY

## 2025-01-15 PROCEDURE — 81002 URINALYSIS NONAUTO W/O SCOPE: CPT | Performed by: OBSTETRICS & GYNECOLOGY

## 2025-01-15 NOTE — PROGRESS NOTES
"Subjective   Patient ID: Breanna Jackson \"Sheldon" is a 34 y.o. female who presents for Confirm pregnancy.  New patient to our practice.  34 years old.  First day of her last menses was November 23.  She stopped birth control pills in August.  She had a positive home pregnancy test late December    This is her fifth pregnancy.  3 vaginal deliveries.  Children are 11, 7, 5 years old.  She has had 1 miscarriage    Typically she has a lot of nausea and vomiting early in pregnancy and she was concerned as she is feeling relatively good.    Her first 2 pregnancies there is a question of whether there was a cardiac abnormality.  And her 11-year-old resolved after birth.  And her 7-year-old that resolved prior to delivery.  We will recommend a fetal echo later in pregnancy    She taking vitamin D and prenatals.  Not smoking.    Surgeries include wisdom teeth, knee and she had a stent placed in her kidney for a large kidney stone    On exam today abdominal pelvic exam along with ultrasound shows evidence of early intrauterine pregnancy.  Will recommend repeat ultrasound in 3 to 4 weeks.  Obtain prenatal blood work.  Reviewed pregnancy recommendations    History of very mild von Willebrand's.  Has seen hematology in past no treatment recommended.        Review of Systems   All other systems reviewed and are negative.      Objective   Physical Exam  Constitutional:       Appearance: Normal appearance. She is obese.   Neurological:      Mental Status: She is alert.         Assessment/Plan   Confirmation of fetal viability.  Early pregnancy.  Reviewed pregnancy recommendations.  Next visit start 2 baby aspirin daily .  Her main concern is that she is feeling so good.  Repeat ultrasound in 3 to 4 weeks         Presley Wise MD 01/15/25 10:14 AM   "

## 2025-01-25 ENCOUNTER — HOSPITAL ENCOUNTER (EMERGENCY)
Facility: HOSPITAL | Age: 35
Discharge: HOME | End: 2025-01-25
Attending: STUDENT IN AN ORGANIZED HEALTH CARE EDUCATION/TRAINING PROGRAM
Payer: MEDICAID

## 2025-01-25 ENCOUNTER — APPOINTMENT (OUTPATIENT)
Dept: RADIOLOGY | Facility: HOSPITAL | Age: 35
End: 2025-01-25
Payer: MEDICAID

## 2025-01-25 ENCOUNTER — HOSPITAL ENCOUNTER (INPATIENT)
Facility: HOSPITAL | Age: 35
End: 2025-01-25
Attending: INTERNAL MEDICINE | Admitting: INTERNAL MEDICINE
Payer: MEDICAID

## 2025-01-25 ENCOUNTER — HOSPITAL ENCOUNTER (EMERGENCY)
Facility: HOSPITAL | Age: 35
Discharge: OTHER INSTITUTION | End: 2025-01-25
Payer: MEDICAID

## 2025-01-25 VITALS
RESPIRATION RATE: 18 BRPM | OXYGEN SATURATION: 100 % | TEMPERATURE: 98.8 F | HEIGHT: 62 IN | HEART RATE: 102 BPM | BODY MASS INDEX: 36.8 KG/M2 | SYSTOLIC BLOOD PRESSURE: 136 MMHG | DIASTOLIC BLOOD PRESSURE: 96 MMHG | WEIGHT: 200 LBS

## 2025-01-25 VITALS
HEIGHT: 62 IN | TEMPERATURE: 98.9 F | DIASTOLIC BLOOD PRESSURE: 89 MMHG | RESPIRATION RATE: 18 BRPM | WEIGHT: 200 LBS | HEART RATE: 110 BPM | OXYGEN SATURATION: 100 % | SYSTOLIC BLOOD PRESSURE: 130 MMHG | BODY MASS INDEX: 36.8 KG/M2

## 2025-01-25 DIAGNOSIS — N13.9 OBSTRUCTED, UROPATHY: Primary | ICD-10-CM

## 2025-01-25 DIAGNOSIS — R10.9 ACUTE LEFT FLANK PAIN: Primary | ICD-10-CM

## 2025-01-25 DIAGNOSIS — N30.00 ACUTE CYSTITIS WITHOUT HEMATURIA: ICD-10-CM

## 2025-01-25 PROBLEM — Z3A.08 8 WEEKS GESTATION OF PREGNANCY (HHS-HCC): Status: ACTIVE | Noted: 2025-01-25

## 2025-01-25 PROBLEM — N39.0 UTI (URINARY TRACT INFECTION): Status: ACTIVE | Noted: 2025-01-25

## 2025-01-25 LAB
ALBUMIN SERPL BCP-MCNC: 4.3 G/DL (ref 3.4–5)
ALBUMIN SERPL BCP-MCNC: 4.4 G/DL (ref 3.4–5)
ALP SERPL-CCNC: 53 U/L (ref 33–110)
ALP SERPL-CCNC: 55 U/L (ref 33–110)
ALT SERPL W P-5'-P-CCNC: 20 U/L (ref 7–45)
ALT SERPL W P-5'-P-CCNC: 20 U/L (ref 7–45)
ANION GAP SERPL CALCULATED.3IONS-SCNC: 14 MMOL/L (ref 10–20)
ANION GAP SERPL CALCULATED.3IONS-SCNC: 16 MMOL/L (ref 10–20)
APPEARANCE UR: CLEAR
APPEARANCE UR: CLEAR
AST SERPL W P-5'-P-CCNC: 15 U/L (ref 9–39)
AST SERPL W P-5'-P-CCNC: 16 U/L (ref 9–39)
B-HCG SERPL-ACNC: ABNORMAL MIU/ML
BACTERIA #/AREA URNS AUTO: ABNORMAL /HPF
BACTERIA #/AREA URNS AUTO: ABNORMAL /HPF
BASOPHILS # BLD AUTO: 0.04 X10*3/UL (ref 0–0.1)
BASOPHILS # BLD AUTO: 0.04 X10*3/UL (ref 0–0.1)
BASOPHILS NFR BLD AUTO: 0.3 %
BASOPHILS NFR BLD AUTO: 0.4 %
BILIRUB SERPL-MCNC: 0.5 MG/DL (ref 0–1.2)
BILIRUB SERPL-MCNC: 0.6 MG/DL (ref 0–1.2)
BILIRUB UR STRIP.AUTO-MCNC: NEGATIVE MG/DL
BILIRUB UR STRIP.AUTO-MCNC: NEGATIVE MG/DL
BUN SERPL-MCNC: 10 MG/DL (ref 6–23)
BUN SERPL-MCNC: 9 MG/DL (ref 6–23)
CALCIUM SERPL-MCNC: 9.4 MG/DL (ref 8.6–10.3)
CALCIUM SERPL-MCNC: 9.5 MG/DL (ref 8.6–10.3)
CHLORIDE SERPL-SCNC: 100 MMOL/L (ref 98–107)
CHLORIDE SERPL-SCNC: 102 MMOL/L (ref 98–107)
CO2 SERPL-SCNC: 20 MMOL/L (ref 21–32)
CO2 SERPL-SCNC: 24 MMOL/L (ref 21–32)
COLOR UR: ABNORMAL
COLOR UR: COLORLESS
CREAT SERPL-MCNC: 0.82 MG/DL (ref 0.5–1.05)
CREAT SERPL-MCNC: 0.85 MG/DL (ref 0.5–1.05)
EGFRCR SERPLBLD CKD-EPI 2021: >90 ML/MIN/1.73M*2
EGFRCR SERPLBLD CKD-EPI 2021: >90 ML/MIN/1.73M*2
EOSINOPHIL # BLD AUTO: 0 X10*3/UL (ref 0–0.7)
EOSINOPHIL # BLD AUTO: 0.01 X10*3/UL (ref 0–0.7)
EOSINOPHIL NFR BLD AUTO: 0 %
EOSINOPHIL NFR BLD AUTO: 0.1 %
ERYTHROCYTE [DISTWIDTH] IN BLOOD BY AUTOMATED COUNT: 12.5 % (ref 11.5–14.5)
ERYTHROCYTE [DISTWIDTH] IN BLOOD BY AUTOMATED COUNT: 12.6 % (ref 11.5–14.5)
GLUCOSE SERPL-MCNC: 124 MG/DL (ref 74–99)
GLUCOSE SERPL-MCNC: 96 MG/DL (ref 74–99)
GLUCOSE UR STRIP.AUTO-MCNC: NORMAL MG/DL
GLUCOSE UR STRIP.AUTO-MCNC: NORMAL MG/DL
HCT VFR BLD AUTO: 39.1 % (ref 36–46)
HCT VFR BLD AUTO: 41.2 % (ref 36–46)
HGB BLD-MCNC: 13.6 G/DL (ref 12–16)
HGB BLD-MCNC: 14.2 G/DL (ref 12–16)
IMM GRANULOCYTES # BLD AUTO: 0.03 X10*3/UL (ref 0–0.7)
IMM GRANULOCYTES # BLD AUTO: 0.04 X10*3/UL (ref 0–0.7)
IMM GRANULOCYTES NFR BLD AUTO: 0.3 % (ref 0–0.9)
IMM GRANULOCYTES NFR BLD AUTO: 0.3 % (ref 0–0.9)
KETONES UR STRIP.AUTO-MCNC: ABNORMAL MG/DL
KETONES UR STRIP.AUTO-MCNC: NEGATIVE MG/DL
LACTATE SERPL-SCNC: 1.2 MMOL/L (ref 0.4–2)
LEUKOCYTE ESTERASE UR QL STRIP.AUTO: ABNORMAL
LEUKOCYTE ESTERASE UR QL STRIP.AUTO: ABNORMAL
LYMPHOCYTES # BLD AUTO: 0.79 X10*3/UL (ref 1.2–4.8)
LYMPHOCYTES # BLD AUTO: 1.8 X10*3/UL (ref 1.2–4.8)
LYMPHOCYTES NFR BLD AUTO: 16.9 %
LYMPHOCYTES NFR BLD AUTO: 4.9 %
MCH RBC QN AUTO: 29.3 PG (ref 26–34)
MCH RBC QN AUTO: 29.6 PG (ref 26–34)
MCHC RBC AUTO-ENTMCNC: 34.5 G/DL (ref 32–36)
MCHC RBC AUTO-ENTMCNC: 34.8 G/DL (ref 32–36)
MCV RBC AUTO: 85 FL (ref 80–100)
MCV RBC AUTO: 85 FL (ref 80–100)
MONOCYTES # BLD AUTO: 0.67 X10*3/UL (ref 0.1–1)
MONOCYTES # BLD AUTO: 0.93 X10*3/UL (ref 0.1–1)
MONOCYTES NFR BLD AUTO: 5.8 %
MONOCYTES NFR BLD AUTO: 6.3 %
MUCOUS THREADS #/AREA URNS AUTO: ABNORMAL /LPF
NEUTROPHILS # BLD AUTO: 14.2 X10*3/UL (ref 1.2–7.7)
NEUTROPHILS # BLD AUTO: 8.1 X10*3/UL (ref 1.2–7.7)
NEUTROPHILS NFR BLD AUTO: 76 %
NEUTROPHILS NFR BLD AUTO: 88.7 %
NITRITE UR QL STRIP.AUTO: NEGATIVE
NITRITE UR QL STRIP.AUTO: NEGATIVE
NRBC BLD-RTO: 0 /100 WBCS (ref 0–0)
NRBC BLD-RTO: 0 /100 WBCS (ref 0–0)
PH UR STRIP.AUTO: 6.5 [PH]
PH UR STRIP.AUTO: 7 [PH]
PLATELET # BLD AUTO: 248 X10*3/UL (ref 150–450)
PLATELET # BLD AUTO: 278 X10*3/UL (ref 150–450)
POTASSIUM SERPL-SCNC: 3.6 MMOL/L (ref 3.5–5.3)
POTASSIUM SERPL-SCNC: 3.7 MMOL/L (ref 3.5–5.3)
PROT SERPL-MCNC: 7.3 G/DL (ref 6.4–8.2)
PROT SERPL-MCNC: 7.5 G/DL (ref 6.4–8.2)
PROT UR STRIP.AUTO-MCNC: ABNORMAL MG/DL
PROT UR STRIP.AUTO-MCNC: NEGATIVE MG/DL
RBC # BLD AUTO: 4.59 X10*6/UL (ref 4–5.2)
RBC # BLD AUTO: 4.84 X10*6/UL (ref 4–5.2)
RBC # UR STRIP.AUTO: ABNORMAL /UL
RBC # UR STRIP.AUTO: ABNORMAL /UL
RBC #/AREA URNS AUTO: ABNORMAL /HPF
RBC #/AREA URNS AUTO: ABNORMAL /HPF
SODIUM SERPL-SCNC: 134 MMOL/L (ref 136–145)
SODIUM SERPL-SCNC: 134 MMOL/L (ref 136–145)
SP GR UR STRIP.AUTO: 1.01
SP GR UR STRIP.AUTO: 1.02
SQUAMOUS #/AREA URNS AUTO: ABNORMAL /HPF
SQUAMOUS #/AREA URNS AUTO: ABNORMAL /HPF
UROBILINOGEN UR STRIP.AUTO-MCNC: NORMAL MG/DL
UROBILINOGEN UR STRIP.AUTO-MCNC: NORMAL MG/DL
WBC # BLD AUTO: 10.7 X10*3/UL (ref 4.4–11.3)
WBC # BLD AUTO: 16 X10*3/UL (ref 4.4–11.3)
WBC #/AREA URNS AUTO: ABNORMAL /HPF
WBC #/AREA URNS AUTO: ABNORMAL /HPF

## 2025-01-25 PROCEDURE — 87086 URINE CULTURE/COLONY COUNT: CPT | Mod: WESLAB

## 2025-01-25 PROCEDURE — 76801 OB US < 14 WKS SINGLE FETUS: CPT

## 2025-01-25 PROCEDURE — 96374 THER/PROPH/DIAG INJ IV PUSH: CPT

## 2025-01-25 PROCEDURE — 2500000001 HC RX 250 WO HCPCS SELF ADMINISTERED DRUGS (ALT 637 FOR MEDICARE OP): Performed by: PHYSICIAN ASSISTANT

## 2025-01-25 PROCEDURE — 81001 URINALYSIS AUTO W/SCOPE: CPT | Performed by: STUDENT IN AN ORGANIZED HEALTH CARE EDUCATION/TRAINING PROGRAM

## 2025-01-25 PROCEDURE — 85025 COMPLETE CBC W/AUTO DIFF WBC: CPT | Performed by: PHYSICIAN ASSISTANT

## 2025-01-25 PROCEDURE — 76817 TRANSVAGINAL US OBSTETRIC: CPT | Performed by: RADIOLOGY

## 2025-01-25 PROCEDURE — 36415 COLL VENOUS BLD VENIPUNCTURE: CPT

## 2025-01-25 PROCEDURE — 2500000004 HC RX 250 GENERAL PHARMACY W/ HCPCS (ALT 636 FOR OP/ED)

## 2025-01-25 PROCEDURE — 81001 URINALYSIS AUTO W/SCOPE: CPT

## 2025-01-25 PROCEDURE — 80053 COMPREHEN METABOLIC PANEL: CPT | Performed by: PHYSICIAN ASSISTANT

## 2025-01-25 PROCEDURE — 83605 ASSAY OF LACTIC ACID: CPT

## 2025-01-25 PROCEDURE — 76770 US EXAM ABDO BACK WALL COMP: CPT

## 2025-01-25 PROCEDURE — 36415 COLL VENOUS BLD VENIPUNCTURE: CPT | Performed by: PHYSICIAN ASSISTANT

## 2025-01-25 PROCEDURE — 87086 URINE CULTURE/COLONY COUNT: CPT | Mod: WESLAB | Performed by: STUDENT IN AN ORGANIZED HEALTH CARE EDUCATION/TRAINING PROGRAM

## 2025-01-25 PROCEDURE — 96375 TX/PRO/DX INJ NEW DRUG ADDON: CPT

## 2025-01-25 PROCEDURE — 84702 CHORIONIC GONADOTROPIN TEST: CPT | Performed by: PHYSICIAN ASSISTANT

## 2025-01-25 PROCEDURE — 2500000002 HC RX 250 W HCPCS SELF ADMINISTERED DRUGS (ALT 637 FOR MEDICARE OP, ALT 636 FOR OP/ED): Performed by: PHYSICIAN ASSISTANT

## 2025-01-25 PROCEDURE — 80053 COMPREHEN METABOLIC PANEL: CPT

## 2025-01-25 PROCEDURE — 99222 1ST HOSP IP/OBS MODERATE 55: CPT | Performed by: INTERNAL MEDICINE

## 2025-01-25 PROCEDURE — 76801 OB US < 14 WKS SINGLE FETUS: CPT | Performed by: RADIOLOGY

## 2025-01-25 PROCEDURE — 76770 US EXAM ABDO BACK WALL COMP: CPT | Performed by: RADIOLOGY

## 2025-01-25 PROCEDURE — 87040 BLOOD CULTURE FOR BACTERIA: CPT | Mod: WESLAB

## 2025-01-25 PROCEDURE — 1100000001 HC PRIVATE ROOM DAILY

## 2025-01-25 PROCEDURE — 99284 EMERGENCY DEPT VISIT MOD MDM: CPT | Mod: 25 | Performed by: STUDENT IN AN ORGANIZED HEALTH CARE EDUCATION/TRAINING PROGRAM

## 2025-01-25 PROCEDURE — 99285 EMERGENCY DEPT VISIT HI MDM: CPT | Mod: 25

## 2025-01-25 PROCEDURE — 85025 COMPLETE CBC W/AUTO DIFF WBC: CPT

## 2025-01-25 RX ORDER — ACETAMINOPHEN 650 MG/1
650 SUPPOSITORY RECTAL EVERY 4 HOURS PRN
Status: DISCONTINUED | OUTPATIENT
Start: 2025-01-25 | End: 2025-01-26 | Stop reason: SDUPTHER

## 2025-01-25 RX ORDER — ONDANSETRON HYDROCHLORIDE 2 MG/ML
4 INJECTION, SOLUTION INTRAVENOUS EVERY 8 HOURS PRN
Status: DISCONTINUED | OUTPATIENT
Start: 2025-01-25 | End: 2025-01-26

## 2025-01-25 RX ORDER — ACETAMINOPHEN 325 MG/1
650 TABLET ORAL EVERY 4 HOURS PRN
Status: DISCONTINUED | OUTPATIENT
Start: 2025-01-25 | End: 2025-01-26 | Stop reason: SDUPTHER

## 2025-01-25 RX ORDER — MORPHINE SULFATE 2 MG/ML
2 INJECTION, SOLUTION INTRAMUSCULAR; INTRAVENOUS ONCE
Status: COMPLETED | OUTPATIENT
Start: 2025-01-25 | End: 2025-01-25

## 2025-01-25 RX ORDER — ACETAMINOPHEN 650 MG/1
650 SUPPOSITORY RECTAL EVERY 4 HOURS PRN
Status: DISCONTINUED | OUTPATIENT
Start: 2025-01-25 | End: 2025-01-28 | Stop reason: HOSPADM

## 2025-01-25 RX ORDER — DOCUSATE SODIUM 100 MG/1
100 CAPSULE, LIQUID FILLED ORAL 2 TIMES DAILY
Status: DISCONTINUED | OUTPATIENT
Start: 2025-01-26 | End: 2025-01-28 | Stop reason: HOSPADM

## 2025-01-25 RX ORDER — NITROFURANTOIN 25; 75 MG/1; MG/1
100 CAPSULE ORAL ONCE
Status: COMPLETED | OUTPATIENT
Start: 2025-01-25 | End: 2025-01-25

## 2025-01-25 RX ORDER — NITROFURANTOIN 25; 75 MG/1; MG/1
100 CAPSULE ORAL 2 TIMES DAILY
Qty: 10 CAPSULE | Refills: 0 | Status: SHIPPED | OUTPATIENT
Start: 2025-01-25 | End: 2025-01-28 | Stop reason: HOSPADM

## 2025-01-25 RX ORDER — CEFTRIAXONE 1 G/50ML
1 INJECTION, SOLUTION INTRAVENOUS EVERY 24 HOURS
Status: DISCONTINUED | OUTPATIENT
Start: 2025-01-26 | End: 2025-01-28 | Stop reason: HOSPADM

## 2025-01-25 RX ORDER — CEFTRIAXONE 2 G/50ML
2 INJECTION, SOLUTION INTRAVENOUS ONCE
Status: COMPLETED | OUTPATIENT
Start: 2025-01-25 | End: 2025-01-25

## 2025-01-25 RX ORDER — TAMSULOSIN HYDROCHLORIDE 0.4 MG/1
0.4 CAPSULE ORAL ONCE
Status: COMPLETED | OUTPATIENT
Start: 2025-01-25 | End: 2025-01-25

## 2025-01-25 RX ORDER — ACETAMINOPHEN 325 MG/1
650 TABLET ORAL EVERY 4 HOURS PRN
Status: DISCONTINUED | OUTPATIENT
Start: 2025-01-25 | End: 2025-01-28 | Stop reason: HOSPADM

## 2025-01-25 RX ORDER — MORPHINE SULFATE 2 MG/ML
2 INJECTION, SOLUTION INTRAMUSCULAR; INTRAVENOUS EVERY 4 HOURS PRN
Status: DISCONTINUED | OUTPATIENT
Start: 2025-01-25 | End: 2025-01-28 | Stop reason: HOSPADM

## 2025-01-25 RX ORDER — GUAIFENESIN/DEXTROMETHORPHAN 100-10MG/5
5 SYRUP ORAL EVERY 4 HOURS PRN
Status: DISCONTINUED | OUTPATIENT
Start: 2025-01-25 | End: 2025-01-28 | Stop reason: HOSPADM

## 2025-01-25 RX ORDER — POLYETHYLENE GLYCOL 3350 17 G/17G
17 POWDER, FOR SOLUTION ORAL DAILY
Status: DISCONTINUED | OUTPATIENT
Start: 2025-01-26 | End: 2025-01-28 | Stop reason: HOSPADM

## 2025-01-25 RX ORDER — ACETAMINOPHEN 500 MG
1000 TABLET ORAL ONCE
Status: COMPLETED | OUTPATIENT
Start: 2025-01-25 | End: 2025-01-25

## 2025-01-25 RX ORDER — ACETAMINOPHEN 160 MG/5ML
650 SOLUTION ORAL EVERY 4 HOURS PRN
Status: DISCONTINUED | OUTPATIENT
Start: 2025-01-25 | End: 2025-01-28 | Stop reason: HOSPADM

## 2025-01-25 RX ORDER — GUAIFENESIN 600 MG/1
600 TABLET, EXTENDED RELEASE ORAL EVERY 12 HOURS PRN
Status: DISCONTINUED | OUTPATIENT
Start: 2025-01-25 | End: 2025-01-28 | Stop reason: HOSPADM

## 2025-01-25 RX ORDER — ONDANSETRON HYDROCHLORIDE 2 MG/ML
4 INJECTION, SOLUTION INTRAVENOUS ONCE
Status: COMPLETED | OUTPATIENT
Start: 2025-01-25 | End: 2025-01-25

## 2025-01-25 RX ORDER — DEXTROSE MONOHYDRATE, SODIUM CHLORIDE, AND POTASSIUM CHLORIDE 50; 1.49; 4.5 G/1000ML; G/1000ML; G/1000ML
100 INJECTION, SOLUTION INTRAVENOUS CONTINUOUS
Status: DISCONTINUED | OUTPATIENT
Start: 2025-01-26 | End: 2025-01-28 | Stop reason: HOSPADM

## 2025-01-25 RX ORDER — TAMSULOSIN HYDROCHLORIDE 0.4 MG/1
0.4 CAPSULE ORAL DAILY
Qty: 7 CAPSULE | Refills: 0 | Status: SHIPPED | OUTPATIENT
Start: 2025-01-25 | End: 2025-01-28 | Stop reason: HOSPADM

## 2025-01-25 RX ORDER — ONDANSETRON 4 MG/1
4 TABLET, ORALLY DISINTEGRATING ORAL EVERY 8 HOURS PRN
Status: DISCONTINUED | OUTPATIENT
Start: 2025-01-25 | End: 2025-01-26

## 2025-01-25 RX ORDER — ACETAMINOPHEN 160 MG/5ML
650 SOLUTION ORAL EVERY 4 HOURS PRN
Status: DISCONTINUED | OUTPATIENT
Start: 2025-01-25 | End: 2025-01-26 | Stop reason: SDUPTHER

## 2025-01-25 RX ADMIN — CEFTRIAXONE SODIUM 2 G: 2 INJECTION, SOLUTION INTRAVENOUS at 22:08

## 2025-01-25 RX ADMIN — ACETAMINOPHEN 1000 MG: 500 TABLET, FILM COATED ORAL at 10:15

## 2025-01-25 RX ADMIN — SODIUM CHLORIDE 1503 ML: 900 INJECTION, SOLUTION INTRAVENOUS at 22:08

## 2025-01-25 RX ADMIN — NITROFURANTOIN MONOHYDRATE/MACROCRYSTALS 100 MG: 75; 25 CAPSULE ORAL at 13:29

## 2025-01-25 RX ADMIN — ONDANSETRON 4 MG: 2 INJECTION INTRAMUSCULAR; INTRAVENOUS at 20:42

## 2025-01-25 RX ADMIN — MORPHINE SULFATE 2 MG: 2 INJECTION, SOLUTION INTRAMUSCULAR; INTRAVENOUS at 20:42

## 2025-01-25 RX ADMIN — TAMSULOSIN HYDROCHLORIDE 0.4 MG: 0.4 CAPSULE ORAL at 13:29

## 2025-01-25 ASSESSMENT — ENCOUNTER SYMPTOMS
NECK PAIN: 0
SORE THROAT: 0
ABDOMINAL DISTENTION: 0
RHINORRHEA: 0
AGITATION: 0
EYE DISCHARGE: 0
NECK STIFFNESS: 0
COUGH: 0
MYALGIAS: 0
SEIZURES: 0
CHEST TIGHTNESS: 0
SINUS PRESSURE: 0
CHILLS: 0
DIZZINESS: 0
SLEEP DISTURBANCE: 0
POLYPHAGIA: 0
NERVOUS/ANXIOUS: 0
TREMORS: 0
HALLUCINATIONS: 0
HYPERACTIVE: 0
ARTHRALGIAS: 0
ANAL BLEEDING: 0
BRUISES/BLEEDS EASILY: 0
DYSURIA: 0
ABDOMINAL PAIN: 0
DIARRHEA: 0
NUMBNESS: 0
PALPITATIONS: 0
HEADACHES: 0
SPEECH DIFFICULTY: 0
CONFUSION: 0
BLOOD IN STOOL: 0
FACIAL ASYMMETRY: 0
STRIDOR: 0
CHOKING: 0
CONSTIPATION: 0
SHORTNESS OF BREATH: 0
HEMATURIA: 0
DECREASED CONCENTRATION: 0
WHEEZING: 0
FEVER: 0
FATIGUE: 0
FACIAL SWELLING: 0
TROUBLE SWALLOWING: 0
ACTIVITY CHANGE: 0
VOICE CHANGE: 0
JOINT SWELLING: 0
RECTAL PAIN: 0
APPETITE CHANGE: 0
DIAPHORESIS: 0
WOUND: 0
WEAKNESS: 0
VOMITING: 0
EYE PAIN: 0
UNEXPECTED WEIGHT CHANGE: 0
COLOR CHANGE: 0
EYE ITCHING: 0
DYSPHORIC MOOD: 0
LIGHT-HEADEDNESS: 0
EYE REDNESS: 0
FLANK PAIN: 1
APNEA: 0
SINUS PAIN: 0
ADENOPATHY: 0
FREQUENCY: 0
BACK PAIN: 0
POLYDIPSIA: 0
PHOTOPHOBIA: 0
NAUSEA: 0
DIFFICULTY URINATING: 0

## 2025-01-25 ASSESSMENT — PAIN - FUNCTIONAL ASSESSMENT
PAIN_FUNCTIONAL_ASSESSMENT: 0-10

## 2025-01-25 ASSESSMENT — LIFESTYLE VARIABLES
TOTAL SCORE: 0
EVER FELT BAD OR GUILTY ABOUT YOUR DRINKING: NO
EVER HAD A DRINK FIRST THING IN THE MORNING TO STEADY YOUR NERVES TO GET RID OF A HANGOVER: NO
HAVE PEOPLE ANNOYED YOU BY CRITICIZING YOUR DRINKING: NO
HAVE YOU EVER FELT YOU SHOULD CUT DOWN ON YOUR DRINKING: NO

## 2025-01-25 ASSESSMENT — PAIN DESCRIPTION - ORIENTATION
ORIENTATION: LEFT
ORIENTATION: LEFT

## 2025-01-25 ASSESSMENT — PAIN SCALES - GENERAL
PAINLEVEL_OUTOF10: 7
PAINLEVEL_OUTOF10: 10 - WORST POSSIBLE PAIN
PAINLEVEL_OUTOF10: 10 - WORST POSSIBLE PAIN
PAINLEVEL_OUTOF10: 7

## 2025-01-25 ASSESSMENT — COLUMBIA-SUICIDE SEVERITY RATING SCALE - C-SSRS

## 2025-01-25 ASSESSMENT — PAIN DESCRIPTION - PAIN TYPE: TYPE: ACUTE PAIN

## 2025-01-25 ASSESSMENT — PAIN DESCRIPTION - LOCATION
LOCATION: OTHER (COMMENT)
LOCATION: BACK

## 2025-01-25 ASSESSMENT — PAIN DESCRIPTION - PROGRESSION: CLINICAL_PROGRESSION: NOT CHANGED

## 2025-01-25 NOTE — ED PROVIDER NOTES
HPI   Chief Complaint   Patient presents with    Flank Pain     Left flank pain started last night, no urinary symptoms, 9 weeks pregnant. Pt denies any vaginal bleeding.        HPI  This is a 34-year-old G1, P0 female presenting to the emergency department for left-sided flank pain that started yesterday night.  Patient states that she does have a history of kidney stones.  She states the pain is in the left part of her back and it does wrap around to the left front side of her abdomen.  She did have some nausea this morning that did alleviate on its own, no vomiting, fever, chills, diarrhea or constipation.  Patient is approximately 9 weeks pregnant.  She denies any vaginal bleeding or vaginal discharge.  She denies any lower pelvic pain.  Denies any hematuria, increased urgency or frequency or dysuria with urinating.    Please see HPI for pertinent positive and negative ROS.       Patient History   Past Medical History:   Diagnosis Date    Encounter for full-term uncomplicated delivery      (spontaneous vaginal delivery)    Encounter for supervision of normal pregnancy, unspecified, first trimester     Encounter for pregnancy related examination in first trimester    Encounter for supervision of normal pregnancy, unspecified, first trimester     Encounter for pregnancy related examination in first trimester    Other specified health status     No pertinent past medical history    Other specified health status 10/10/2019    No pertinent past surgical history    Personal history of urinary calculi     History of kidney stones    Unspecified infection of urinary tract in pregnancy, second trimester ([facility] 2019    UTI (urinary tract infection) in pregnancy in second trimester     Past Surgical History:   Procedure Laterality Date    OTHER SURGICAL HISTORY  2020    Oral surgery    OTHER SURGICAL HISTORY  06/10/2019    Repair of bladder     Family History   Problem Relation Name Age of Onset    Other  (Cardiac Disorder) Mother      Hypothyroidism Mother      Breast cancer Mother      Uterine cancer Mother      Heart murmur Son      Other (LSVC (Persistent left superior vena cava)) Son      Other (LSVC (persistent left suprior vena cava)) Other Child      Social History     Tobacco Use    Smoking status: Never    Smokeless tobacco: Never   Substance Use Topics    Alcohol use: Never    Drug use: Never       Physical Exam   ED Triage Vitals [01/25/25 0954]   Temperature Heart Rate Respirations BP   37.1 °C (98.8 °F) 90 16 (!) 148/106      Pulse Ox Temp Source Heart Rate Source Patient Position   100 % Tympanic Monitor Sitting      BP Location FiO2 (%)     Left arm --       Physical Exam  GENERAL APPEARANCE: Awake and alert. No acute respiratory distress.   VITAL SIGNS: As per the nurses' triage record.  HEENT: Normocephalic, atraumatic. Extraocular muscles are intact. Conjunctiva are pink. Negative scleral icterus. Mucous membranes are moist. Tongue in the midline. Oropharynx clear, uvula midline.   NECK: Soft, nontender and supple,  CHEST: Nontender to palpation. Clear to auscultation bilaterally. No rales, rhonchi, or wheezing. Symmetric rise and fall of chest wall.   HEART: Clear S1 and S2. Regular rate and rhythm. No murmurs appreciated on auscultation.  Strong and equal pulses in the extremities.  ABDOMEN: Soft, nontender, nondistended, mild CVA tenderness on the left side.  MUSCULOSKELETAL: Full gross active range of motion. Ambulating on own with no acute difficulties  NEUROLOGICAL: Awake, alert and oriented x 3. Motor power intact in the upper and lower extremities. Sensation is intact to light touch in the upper and lower extremities. Patient answering questions appropriately.   IMMUNOLOGICAL: No lymphatic streaking noted  DERMATOLOGIC: Warm and dry without petechiae, rashes, or ecchymosis noted on visible skin.   PYSCH: Cooperative with appropriate mood and affect.    ED Course & MDM   Diagnoses as of  01/26/25 0915   Acute left flank pain                 No data recorded     Shiner Coma Scale Score: 15 (01/25/25 0956 : Joanna Riddle RN)                           Medical Decision Making  Parts of this chart have been completed using voice recognition software. Please excuse any errors of transcription.  My thought process and reason for plan has been formulated from the time that I saw the patient until the time of disposition and is not specific to one specific moment during their visit and furthermore my MDM encompasses this entire chart and not only this text box.      HPI: Detailed above.    Exam: A medically appropriate exam performed, outlined above, given the known history and presentation.    History obtained from: Patient    Medications given during visit:  Medications   acetaminophen (Tylenol) tablet 1,000 mg (1,000 mg oral Given 1/25/25 1015)   nitrofurantoin (macrocrystal-monohydrate) (Macrobid) capsule 100 mg (100 mg oral Given 1/25/25 1329)   tamsulosin (Flomax) 24 hr capsule 0.4 mg (0.4 mg oral Given 1/25/25 1329)        Diagnostic/tests  Labs Reviewed   URINALYSIS WITH REFLEX MICROSCOPIC - Abnormal       Result Value    Color, Urine Colorless (*)     Appearance, Urine Clear      Specific Gravity, Urine 1.006      pH, Urine 7.0      Protein, Urine NEGATIVE      Glucose, Urine Normal      Blood, Urine 0.03 (TRACE) (*)     Ketones, Urine NEGATIVE      Bilirubin, Urine NEGATIVE      Urobilinogen, Urine Normal      Nitrite, Urine NEGATIVE      Leukocyte Esterase, Urine 250 Carlos/uL (*)    CBC WITH AUTO DIFFERENTIAL - Abnormal    WBC 10.7      nRBC 0.0      RBC 4.84      Hemoglobin 14.2      Hematocrit 41.2      MCV 85      MCH 29.3      MCHC 34.5      RDW 12.5      Platelets 278      Neutrophils % 76.0      Immature Granulocytes %, Automated 0.3      Lymphocytes % 16.9      Monocytes % 6.3      Eosinophils % 0.1      Basophils % 0.4      Neutrophils Absolute 8.10 (*)     Immature Granulocytes Absolute,  Automated 0.03      Lymphocytes Absolute 1.80      Monocytes Absolute 0.67      Eosinophils Absolute 0.01      Basophils Absolute 0.04     COMPREHENSIVE METABOLIC PANEL - Abnormal    Glucose 96      Sodium 134 (*)     Potassium 3.7      Chloride 100      Bicarbonate 24      Anion Gap 14      Urea Nitrogen 10      Creatinine 0.85      eGFR >90      Calcium 9.5      Albumin 4.3      Alkaline Phosphatase 53      Total Protein 7.3      AST 16      Bilirubin, Total 0.5      ALT 20     HUMAN CHORIONIC GONADOTROPIN, SERUM QUANTITATIVE - Abnormal    HCG, Beta-Quantitative 106,633 (*)     Narrative:      Total HCG measurement is performed using the [employer] Access   Immunoassay which detects intact HCG and free beta HCG subunit.    This test is not indicated for use as a tumor marker.   HCG testing is performed using a different test methodology at [facility] than other [facility]. Direct result comparison   should only be made within the same method.       MICROSCOPIC ONLY, URINE - Abnormal    WBC, Urine 11-20 (*)     RBC, Urine 1-2      Squamous Epithelial Cells, Urine 1-9 (SPARSE)      Bacteria, Urine 1+ (*)    URINE CULTURE      US PELVIS OB TRANSABDOMINAL W TRANSVAGINAL UP TO 1ST TRIMESTER   Final Result   Single live intrauterine pregnancy with estimated gestational age of   8 weeks 0 days +/-5 days. Ultrasound [facility] is 09/06/2025. Based on LMP   of 11/23/2024 the current gestational age would be 9 weeks 0 days.        Nonvisualization of the left ovary.        MACRO:   None.        Signed by: Franci Rios 1/25/2025 12:51 PM   Dictation workstation:   [id number]      US renal complete   Final Result   Mild left hydronephrosis.   Suspect bilateral nephroliths.   Suspect hepatic steatosis.        MACRO:   None        Signed by: Ray Roger 1/25/2025 12:23 PM   Dictation workstation:   [id number]           Considerations/further MDM:  Patient was seen in conjucntion with my supervising  physician,  Dr. Calderon. Please refer to his note.    Differential diagnosis includes but not limited to ureterolithiasis versus pyelonephritis versus acute cystitis versus musculoskeletal pain    Ultrasound of the pelvis shows single live intrauterine pregnancy with estimated gestational age of 8 weeks.  Renal ultrasound shows mild left-sided hydronephrosis, suspected bilateral nephroliths and suspected hepatic steatosis.    CBC shows no leukocytosis, white blood cell count 10.7.  CMP unremarkable.  Quant beta hCG 106,663.  Urinalysis shows 250 leukocytes with trace blood, micro shows 11-20 WBCs with 1+ bacteria.    Did discuss case with urologist, Dr. De Guzman -we thoroughly went over patient's labs and imaging findings.  He states as long as patient's pain is controlled with oral Tylenol he recommends Flomax and antibiotic for possible left-sided kidney stone.  He also states that the hydronephrosis could be secondary to pregnancy.  He states that he will follow-up with patient in office on Monday.  Patient's pain was mild in the emergency department and she states with the oral Tylenol it was controlled.  She does have a anaphylactic allergy to penicillin so I did give her a dose of nitrofurantoin and Flomax.  Patient was discharged with nitrofurantoin and Flomax.  She was educated to use over-the-counter Tylenol.  I did give patient very strict return precautions including fever, chills, body aches, worsening pain that cannot be controlled with oral Tylenol, vomiting, vaginal bleeding etc. and patient verbalized this and felt comfortable to plan home and follow-up with urology.  She was discharged in stable condition.    Procedure  Procedures     Viviana Mcneill PA-C  01/26/25 1921

## 2025-01-25 NOTE — DISCHARGE INSTRUCTIONS
Please take tamsulosin and nitrofurantoin as prescribed.  Please ensure you are drinking plenty of liquids.  If you start to have fever, chills, worsening pain that you cannot control at home with Tylenol, vomiting return to the emergency department immediately for reevaluation.  Please follow-up with urology list on your discharge paperwork.

## 2025-01-25 NOTE — Clinical Note
Breanna Jackson was seen and treated in our emergency department on 1/25/2025.  She may return to work on 01/28/2025.       If you have any questions or concerns, please don't hesitate to call.      Kang Calderon, DO

## 2025-01-26 ENCOUNTER — APPOINTMENT (OUTPATIENT)
Dept: RADIOLOGY | Facility: HOSPITAL | Age: 35
End: 2025-01-26
Payer: MEDICAID

## 2025-01-26 VITALS
WEIGHT: 200 LBS | SYSTOLIC BLOOD PRESSURE: 110 MMHG | HEIGHT: 62 IN | OXYGEN SATURATION: 97 % | DIASTOLIC BLOOD PRESSURE: 68 MMHG | RESPIRATION RATE: 19 BRPM | TEMPERATURE: 98.4 F | BODY MASS INDEX: 36.8 KG/M2 | HEART RATE: 117 BPM

## 2025-01-26 LAB
ALBUMIN SERPL BCP-MCNC: 3.8 G/DL (ref 3.4–5)
ALP SERPL-CCNC: 49 U/L (ref 33–110)
ALT SERPL W P-5'-P-CCNC: 16 U/L (ref 7–45)
ANION GAP SERPL CALCULATED.3IONS-SCNC: 14 MMOL/L (ref 10–20)
AST SERPL W P-5'-P-CCNC: 13 U/L (ref 9–39)
BACTERIA BLD CULT: NORMAL
BACTERIA BLD CULT: NORMAL
BILIRUB SERPL-MCNC: 0.5 MG/DL (ref 0–1.2)
BUN SERPL-MCNC: 6 MG/DL (ref 6–23)
CALCIUM SERPL-MCNC: 8.4 MG/DL (ref 8.6–10.3)
CHLORIDE SERPL-SCNC: 102 MMOL/L (ref 98–107)
CO2 SERPL-SCNC: 19 MMOL/L (ref 21–32)
CREAT SERPL-MCNC: 0.87 MG/DL (ref 0.5–1.05)
EGFRCR SERPLBLD CKD-EPI 2021: 90 ML/MIN/1.73M*2
ERYTHROCYTE [DISTWIDTH] IN BLOOD BY AUTOMATED COUNT: 12.8 % (ref 11.5–14.5)
GLUCOSE SERPL-MCNC: 130 MG/DL (ref 74–99)
HCT VFR BLD AUTO: 35.7 % (ref 36–46)
HGB BLD-MCNC: 12.2 G/DL (ref 12–16)
HOLD SPECIMEN: NORMAL
MCH RBC QN AUTO: 29.3 PG (ref 26–34)
MCHC RBC AUTO-ENTMCNC: 34.2 G/DL (ref 32–36)
MCV RBC AUTO: 86 FL (ref 80–100)
NRBC BLD-RTO: 0 /100 WBCS (ref 0–0)
PLATELET # BLD AUTO: 241 X10*3/UL (ref 150–450)
POTASSIUM SERPL-SCNC: 3.5 MMOL/L (ref 3.5–5.3)
PROT SERPL-MCNC: 6.5 G/DL (ref 6.4–8.2)
RBC # BLD AUTO: 4.16 X10*6/UL (ref 4–5.2)
SODIUM SERPL-SCNC: 131 MMOL/L (ref 136–145)
WBC # BLD AUTO: 19.1 X10*3/UL (ref 4.4–11.3)

## 2025-01-26 PROCEDURE — 2500000001 HC RX 250 WO HCPCS SELF ADMINISTERED DRUGS (ALT 637 FOR MEDICARE OP): Performed by: INTERNAL MEDICINE

## 2025-01-26 PROCEDURE — 1100000001 HC PRIVATE ROOM DAILY

## 2025-01-26 PROCEDURE — 99232 SBSQ HOSP IP/OBS MODERATE 35: CPT | Performed by: INTERNAL MEDICINE

## 2025-01-26 PROCEDURE — 74176 CT ABD & PELVIS W/O CONTRAST: CPT

## 2025-01-26 PROCEDURE — G0425 INPT/ED TELECONSULT30: HCPCS | Performed by: SURGERY

## 2025-01-26 PROCEDURE — 99252 IP/OBS CONSLTJ NEW/EST SF 35: CPT | Performed by: OBSTETRICS & GYNECOLOGY

## 2025-01-26 PROCEDURE — 2500000005 HC RX 250 GENERAL PHARMACY W/O HCPCS: Performed by: OBSTETRICS & GYNECOLOGY

## 2025-01-26 PROCEDURE — 36415 COLL VENOUS BLD VENIPUNCTURE: CPT | Performed by: INTERNAL MEDICINE

## 2025-01-26 PROCEDURE — 74176 CT ABD & PELVIS W/O CONTRAST: CPT | Performed by: RADIOLOGY

## 2025-01-26 PROCEDURE — 2500000004 HC RX 250 GENERAL PHARMACY W/ HCPCS (ALT 636 FOR OP/ED): Performed by: INTERNAL MEDICINE

## 2025-01-26 PROCEDURE — 85027 COMPLETE CBC AUTOMATED: CPT | Performed by: INTERNAL MEDICINE

## 2025-01-26 PROCEDURE — 80053 COMPREHEN METABOLIC PANEL: CPT | Performed by: INTERNAL MEDICINE

## 2025-01-26 RX ORDER — ONDANSETRON 4 MG/1
4 TABLET, ORALLY DISINTEGRATING ORAL EVERY 8 HOURS PRN
Status: DISCONTINUED | OUTPATIENT
Start: 2025-01-26 | End: 2025-01-28 | Stop reason: HOSPADM

## 2025-01-26 RX ORDER — LIDOCAINE 560 MG/1
1 PATCH PERCUTANEOUS; TOPICAL; TRANSDERMAL DAILY
Status: DISCONTINUED | OUTPATIENT
Start: 2025-01-26 | End: 2025-01-28 | Stop reason: HOSPADM

## 2025-01-26 RX ORDER — ONDANSETRON HYDROCHLORIDE 2 MG/ML
4 INJECTION, SOLUTION INTRAVENOUS EVERY 6 HOURS PRN
Status: DISCONTINUED | OUTPATIENT
Start: 2025-01-26 | End: 2025-01-28 | Stop reason: HOSPADM

## 2025-01-26 RX ADMIN — DEXTROSE MONOHYDRATE, SODIUM CHLORIDE, AND POTASSIUM CHLORIDE 100 ML/HR: 50; 4.5; 1.49 INJECTION, SOLUTION INTRAVENOUS at 11:46

## 2025-01-26 RX ADMIN — MORPHINE SULFATE 2 MG: 2 INJECTION, SOLUTION INTRAMUSCULAR; INTRAVENOUS at 05:37

## 2025-01-26 RX ADMIN — DEXTROSE MONOHYDRATE, SODIUM CHLORIDE, AND POTASSIUM CHLORIDE 100 ML/HR: 50; 4.5; 1.49 INJECTION, SOLUTION INTRAVENOUS at 00:38

## 2025-01-26 RX ADMIN — ONDANSETRON HYDROCHLORIDE 4 MG: 2 INJECTION INTRAMUSCULAR; INTRAVENOUS at 00:50

## 2025-01-26 RX ADMIN — MORPHINE SULFATE 2 MG: 2 INJECTION, SOLUTION INTRAMUSCULAR; INTRAVENOUS at 01:01

## 2025-01-26 RX ADMIN — DOCUSATE SODIUM 100 MG: 100 CAPSULE, LIQUID FILLED ORAL at 21:30

## 2025-01-26 RX ADMIN — LIDOCAINE 1 PATCH: 4 PATCH TOPICAL at 17:29

## 2025-01-26 RX ADMIN — MORPHINE SULFATE 2 MG: 2 INJECTION, SOLUTION INTRAMUSCULAR; INTRAVENOUS at 19:49

## 2025-01-26 RX ADMIN — MORPHINE SULFATE 2 MG: 2 INJECTION, SOLUTION INTRAMUSCULAR; INTRAVENOUS at 14:40

## 2025-01-26 RX ADMIN — ONDANSETRON 4 MG: 2 INJECTION INTRAMUSCULAR; INTRAVENOUS at 15:52

## 2025-01-26 RX ADMIN — ACETAMINOPHEN 650 MG: 325 TABLET ORAL at 11:24

## 2025-01-26 RX ADMIN — MORPHINE SULFATE 2 MG: 2 INJECTION, SOLUTION INTRAMUSCULAR; INTRAVENOUS at 09:52

## 2025-01-26 RX ADMIN — ONDANSETRON 4 MG: 2 INJECTION INTRAMUSCULAR; INTRAVENOUS at 21:57

## 2025-01-26 RX ADMIN — DEXTROSE MONOHYDRATE, SODIUM CHLORIDE, AND POTASSIUM CHLORIDE 100 ML/HR: 50; 4.5; 1.49 INJECTION, SOLUTION INTRAVENOUS at 22:28

## 2025-01-26 RX ADMIN — DEXTROSE MONOHYDRATE, SODIUM CHLORIDE, AND POTASSIUM CHLORIDE 100 ML/HR: 50; 4.5; 1.49 INJECTION, SOLUTION INTRAVENOUS at 11:41

## 2025-01-26 RX ADMIN — CEFTRIAXONE SODIUM 1 G: 1 INJECTION, SOLUTION INTRAVENOUS at 21:34

## 2025-01-26 RX ADMIN — ONDANSETRON HYDROCHLORIDE 4 MG: 2 INJECTION INTRAMUSCULAR; INTRAVENOUS at 09:52

## 2025-01-26 RX ADMIN — DOCUSATE SODIUM 100 MG: 100 CAPSULE, LIQUID FILLED ORAL at 00:41

## 2025-01-26 SDOH — SOCIAL STABILITY: SOCIAL INSECURITY: DO YOU FEEL ANYONE HAS EXPLOITED OR TAKEN ADVANTAGE OF YOU FINANCIALLY OR OF YOUR PERSONAL PROPERTY?: NO

## 2025-01-26 SDOH — ECONOMIC STABILITY: FOOD INSECURITY: WITHIN THE PAST 12 MONTHS, THE FOOD YOU BOUGHT JUST DIDN'T LAST AND YOU DIDN'T HAVE MONEY TO GET MORE.: NEVER TRUE

## 2025-01-26 SDOH — ECONOMIC STABILITY: FOOD INSECURITY: WITHIN THE PAST 12 MONTHS, YOU WORRIED THAT YOUR FOOD WOULD RUN OUT BEFORE YOU GOT THE MONEY TO BUY MORE.: NEVER TRUE

## 2025-01-26 SDOH — ECONOMIC STABILITY: TRANSPORTATION INSECURITY: IN THE PAST 12 MONTHS, HAS LACK OF TRANSPORTATION KEPT YOU FROM MEDICAL APPOINTMENTS OR FROM GETTING MEDICATIONS?: NO

## 2025-01-26 SDOH — SOCIAL STABILITY: SOCIAL INSECURITY
WITHIN THE LAST YEAR, HAVE YOU BEEN KICKED, HIT, SLAPPED, OR OTHERWISE PHYSICALLY HURT BY YOUR PARTNER OR EX-PARTNER?: NO

## 2025-01-26 SDOH — SOCIAL STABILITY: SOCIAL INSECURITY
WITHIN THE LAST YEAR, HAVE YOU BEEN RAPED OR FORCED TO HAVE ANY KIND OF SEXUAL ACTIVITY BY YOUR PARTNER OR EX-PARTNER?: NO

## 2025-01-26 SDOH — SOCIAL STABILITY: SOCIAL INSECURITY: WITHIN THE LAST YEAR, HAVE YOU BEEN HUMILIATED OR EMOTIONALLY ABUSED IN OTHER WAYS BY YOUR PARTNER OR EX-PARTNER?: NO

## 2025-01-26 SDOH — SOCIAL STABILITY: SOCIAL INSECURITY: HAVE YOU HAD THOUGHTS OF HARMING ANYONE ELSE?: YES

## 2025-01-26 SDOH — SOCIAL STABILITY: SOCIAL INSECURITY: ARE YOU OR HAVE YOU BEEN THREATENED OR ABUSED PHYSICALLY, EMOTIONALLY, OR SEXUALLY BY ANYONE?: NO

## 2025-01-26 SDOH — SOCIAL STABILITY: SOCIAL INSECURITY: ARE THERE ANY APPARENT SIGNS OF INJURIES/BEHAVIORS THAT COULD BE RELATED TO ABUSE/NEGLECT?: NO

## 2025-01-26 SDOH — SOCIAL STABILITY: SOCIAL INSECURITY: DO YOU FEEL UNSAFE GOING BACK TO THE PLACE WHERE YOU ARE LIVING?: NO

## 2025-01-26 SDOH — SOCIAL STABILITY: SOCIAL INSECURITY: DOES ANYONE TRY TO KEEP YOU FROM HAVING/CONTACTING OTHER FRIENDS OR DOING THINGS OUTSIDE YOUR HOME?: NO

## 2025-01-26 SDOH — SOCIAL STABILITY: SOCIAL INSECURITY: WITHIN THE LAST YEAR, HAVE YOU BEEN AFRAID OF YOUR PARTNER OR EX-PARTNER?: NO

## 2025-01-26 SDOH — SOCIAL STABILITY: SOCIAL INSECURITY: HAS ANYONE EVER THREATENED TO HURT YOUR FAMILY OR YOUR PETS?: NO

## 2025-01-26 SDOH — ECONOMIC STABILITY: FOOD INSECURITY: HOW HARD IS IT FOR YOU TO PAY FOR THE VERY BASICS LIKE FOOD, HOUSING, MEDICAL CARE, AND HEATING?: NOT HARD AT ALL

## 2025-01-26 SDOH — SOCIAL STABILITY: SOCIAL INSECURITY: HAVE YOU HAD ANY THOUGHTS OF HARMING ANYONE ELSE?: NO

## 2025-01-26 SDOH — SOCIAL STABILITY: SOCIAL INSECURITY: ABUSE: ADULT

## 2025-01-26 ASSESSMENT — ACTIVITIES OF DAILY LIVING (ADL)
LACK_OF_TRANSPORTATION: NO
WALKS IN HOME: INDEPENDENT
TOILETING: INDEPENDENT
PATIENT'S MEMORY ADEQUATE TO SAFELY COMPLETE DAILY ACTIVITIES?: YES
GROOMING: INDEPENDENT
BATHING: INDEPENDENT
ADEQUATE_TO_COMPLETE_ADL: YES
DRESSING YOURSELF: INDEPENDENT
LACK_OF_TRANSPORTATION: NO
HEARING - RIGHT EAR: FUNCTIONAL
FEEDING YOURSELF: INDEPENDENT
JUDGMENT_ADEQUATE_SAFELY_COMPLETE_DAILY_ACTIVITIES: YES
HEARING - LEFT EAR: FUNCTIONAL

## 2025-01-26 ASSESSMENT — PATIENT HEALTH QUESTIONNAIRE - PHQ9
2. FEELING DOWN, DEPRESSED OR HOPELESS: NOT AT ALL
SUM OF ALL RESPONSES TO PHQ9 QUESTIONS 1 & 2: 0
1. LITTLE INTEREST OR PLEASURE IN DOING THINGS: NOT AT ALL

## 2025-01-26 ASSESSMENT — COGNITIVE AND FUNCTIONAL STATUS - GENERAL
DAILY ACTIVITIY SCORE: 24
PATIENT BASELINE BEDBOUND: NO
MOBILITY SCORE: 24
DAILY ACTIVITIY SCORE: 24
MOBILITY SCORE: 24

## 2025-01-26 ASSESSMENT — PAIN DESCRIPTION - DESCRIPTORS
DESCRIPTORS: SHARP
DESCRIPTORS: SHARP

## 2025-01-26 ASSESSMENT — PAIN SCALES - GENERAL
PAINLEVEL_OUTOF10: 7
PAINLEVEL_OUTOF10: 6
PAINLEVEL_OUTOF10: 9
PAINLEVEL_OUTOF10: 6
PAINLEVEL_OUTOF10: 9
PAINLEVEL_OUTOF10: 3
PAINLEVEL_OUTOF10: 5 - MODERATE PAIN
PAINLEVEL_OUTOF10: 6
PAINLEVEL_OUTOF10: 8
PAINLEVEL_OUTOF10: 4
PAINLEVEL_OUTOF10: 9

## 2025-01-26 ASSESSMENT — PAIN - FUNCTIONAL ASSESSMENT
PAIN_FUNCTIONAL_ASSESSMENT: 0-10

## 2025-01-26 ASSESSMENT — PAIN DESCRIPTION - LOCATION
LOCATION: ABDOMEN

## 2025-01-26 ASSESSMENT — COLUMBIA-SUICIDE SEVERITY RATING SCALE - C-SSRS
6. HAVE YOU EVER DONE ANYTHING, STARTED TO DO ANYTHING, OR PREPARED TO DO ANYTHING TO END YOUR LIFE?: NO
2. HAVE YOU ACTUALLY HAD ANY THOUGHTS OF KILLING YOURSELF?: NO
1. IN THE PAST MONTH, HAVE YOU WISHED YOU WERE DEAD OR WISHED YOU COULD GO TO SLEEP AND NOT WAKE UP?: NO

## 2025-01-26 ASSESSMENT — PAIN DESCRIPTION - ORIENTATION
ORIENTATION: MID
ORIENTATION: LEFT
ORIENTATION: MID

## 2025-01-26 ASSESSMENT — LIFESTYLE VARIABLES
AUDIT-C TOTAL SCORE: 0
AUDIT-C TOTAL SCORE: 0
HOW OFTEN DO YOU HAVE A DRINK CONTAINING ALCOHOL: NEVER
HOW OFTEN DO YOU HAVE 6 OR MORE DRINKS ON ONE OCCASION: NEVER
SKIP TO QUESTIONS 9-10: 1
HOW MANY STANDARD DRINKS CONTAINING ALCOHOL DO YOU HAVE ON A TYPICAL DAY: PATIENT DOES NOT DRINK

## 2025-01-26 NOTE — H&P
"History Of Present Illness  Breanna Jackson \"Sheldon" is a 34 y.o. female presenting with flank pain and nausea.  Patient is 9 weeks pregnant.  In the emergency room diagnosed with obstructive uropathy.  She came to the emergency room discharged home and then came back as pain got worse and she was vomiting.     Past Medical History  Past Medical History:   Diagnosis Date    Encounter for full-term uncomplicated delivery      (spontaneous vaginal delivery)    Encounter for supervision of normal pregnancy, unspecified, first trimester     Encounter for pregnancy related examination in first trimester    Encounter for supervision of normal pregnancy, unspecified, first trimester     Encounter for pregnancy related examination in first trimester    Other specified health status     No pertinent past medical history    Other specified health status 10/10/2019    No pertinent past surgical history    Personal history of urinary calculi     History of kidney stones    Unspecified infection of urinary tract in pregnancy, second trimester ([facility] 2019    UTI (urinary tract infection) in pregnancy in second trimester       Surgical History  Past Surgical History:   Procedure Laterality Date    OTHER SURGICAL HISTORY  2020    Oral surgery    OTHER SURGICAL HISTORY  06/10/2019    Repair of bladder        Social History  She reports that she has never smoked. She has never used smokeless tobacco. She reports that she does not drink alcohol and does not use drugs.    Family History  Family History   Problem Relation Name Age of Onset    Other (Cardiac Disorder) Mother      Hypothyroidism Mother      Breast cancer Mother      Uterine cancer Mother      Heart murmur Son      Other (LSVC (Persistent left superior vena cava)) Son      Other (LSVC (persistent left suprior vena cava)) Other Child    Mother with von Willebrand's disease ovarian and breast cancer and endometriosis ,a fib, dont know fatherbrian  of MI " young      Allergies  Amoxicillin, Penicillins, Prednisone, and Latex    Review of Systems   Constitutional:  Negative for activity change, appetite change, chills, diaphoresis, fatigue, fever and unexpected weight change.   HENT:  Negative for congestion, dental problem, drooling, ear discharge, ear pain, facial swelling, hearing loss, mouth sores, nosebleeds, postnasal drip, rhinorrhea, sinus pressure, sinus pain, sneezing, sore throat, tinnitus, trouble swallowing and voice change.    Eyes:  Negative for photophobia, pain, discharge, redness, itching and visual disturbance.   Respiratory:  Negative for apnea, cough, choking, chest tightness, shortness of breath, wheezing and stridor.    Cardiovascular:  Negative for chest pain, palpitations and leg swelling.   Gastrointestinal:  Negative for abdominal distention, abdominal pain, anal bleeding, blood in stool, constipation, diarrhea, nausea, rectal pain and vomiting.   Endocrine: Negative for cold intolerance, heat intolerance, polydipsia, polyphagia and polyuria.   Genitourinary:  Positive for flank pain. Negative for decreased urine volume, difficulty urinating, dysuria, enuresis, frequency, genital sores, hematuria and urgency.   Musculoskeletal:  Negative for arthralgias, back pain, gait problem, joint swelling, myalgias, neck pain and neck stiffness.   Skin:  Negative for color change, pallor, rash and wound.   Allergic/Immunologic: Negative for environmental allergies, food allergies and immunocompromised state.   Neurological:  Negative for dizziness, tremors, seizures, syncope, facial asymmetry, speech difficulty, weakness, light-headedness, numbness and headaches.   Hematological:  Negative for adenopathy. Does not bruise/bleed easily.   Psychiatric/Behavioral:  Negative for agitation, behavioral problems, confusion, decreased concentration, dysphoric mood, hallucinations, self-injury, sleep disturbance and suicidal ideas. The patient is not  nervous/anxious and is not hyperactive.         Physical Exam  Vitals reviewed.   Constitutional:       Appearance: Normal appearance.   HENT:      Head: Normocephalic and atraumatic.      Right Ear: Tympanic membrane, ear canal and external ear normal.      Left Ear: Tympanic membrane, ear canal and external ear normal.      Nose: Nose normal.      Mouth/Throat:      Pharynx: Oropharynx is clear.   Eyes:      Extraocular Movements: Extraocular movements intact.      Conjunctiva/sclera: Conjunctivae normal.      Pupils: Pupils are equal, round, and reactive to light.   Cardiovascular:      Rate and Rhythm: Normal rate and regular rhythm.      Pulses: Normal pulses.      Heart sounds: Normal heart sounds.   Pulmonary:      Effort: Pulmonary effort is normal.      Breath sounds: Normal breath sounds.   Abdominal:      General: Abdomen is flat. Bowel sounds are normal.      Palpations: Abdomen is soft.   Musculoskeletal:      Cervical back: Normal range of motion and neck supple.   Skin:     General: Skin is warm and dry.   Neurological:      General: No focal deficit present.      Mental Status: She is alert and oriented to person, place, and time.   Psychiatric:         Mood and Affect: Mood normal.          Last Recorded Vitals  Blood pressure 130/84, pulse 104, temperature 37.1 °C (98.8 °F), temperature source Temporal, resp. rate 18, last menstrual period 11/23/2024, SpO2 99%.    Relevant Results        Scheduled medications  [START ON 1/26/2025] cefTRIAXone, 1 g, intravenous, q24h  [START ON 1/26/2025] docusate sodium, 100 mg, oral, BID  [START ON 1/26/2025] polyethylene glycol, 17 g, oral, Daily      Continuous medications  [START ON 1/26/2025] potassium [id number]%NaCl, 100 mL/hr      PRN medications  PRN medications: acetaminophen **OR** acetaminophen **OR** acetaminophen, acetaminophen **OR** acetaminophen **OR** acetaminophen, benzocaine-menthol, dextromethorphan-guaifenesin, guaiFENesin, morphine,  ondansetron **OR** ondansetron  Results for orders placed or performed during the hospital encounter of 01/25/25 (from the past 24 hours)   CBC and Auto Differential   Result Value Ref Range    WBC 16.0 (H) 4.4 - 11.3 x10*3/uL    nRBC 0.0 0.0 - 0.0 /100 WBCs    RBC 4.59 4.00 - 5.20 x10*6/uL    Hemoglobin 13.6 12.0 - 16.0 g/dL    Hematocrit 39.1 36.0 - 46.0 %    MCV 85 80 - 100 fL    MCH 29.6 26.0 - 34.0 pg    MCHC 34.8 32.0 - 36.0 g/dL    RDW 12.6 11.5 - 14.5 %    Platelets 248 150 - 450 x10*3/uL    Neutrophils % 88.7 40.0 - 80.0 %    Immature Granulocytes %, Automated 0.3 0.0 - 0.9 %    Lymphocytes % 4.9 13.0 - 44.0 %    Monocytes % 5.8 2.0 - 10.0 %    Eosinophils % 0.0 0.0 - 6.0 %    Basophils % 0.3 0.0 - 2.0 %    Neutrophils Absolute 14.20 (H) 1.20 - 7.70 x10*3/uL    Immature Granulocytes Absolute, Automated 0.04 0.00 - 0.70 x10*3/uL    Lymphocytes Absolute 0.79 (L) 1.20 - 4.80 x10*3/uL    Monocytes Absolute 0.93 0.10 - 1.00 x10*3/uL    Eosinophils Absolute 0.00 0.00 - 0.70 x10*3/uL    Basophils Absolute 0.04 0.00 - 0.10 x10*3/uL   Comprehensive metabolic panel   Result Value Ref Range    Glucose 124 (H) 74 - 99 mg/dL    Sodium 134 (L) 136 - 145 mmol/L    Potassium 3.6 3.5 - 5.3 mmol/L    Chloride 102 98 - 107 mmol/L    Bicarbonate 20 (L) 21 - 32 mmol/L    Anion Gap 16 10 - 20 mmol/L    Urea Nitrogen 9 6 - 23 mg/dL    Creatinine 0.82 0.50 - 1.05 mg/dL    eGFR >90 >60 mL/min/1.73m*2    Calcium 9.4 8.6 - 10.3 mg/dL    Albumin 4.4 3.4 - 5.0 g/dL    Alkaline Phosphatase 55 33 - 110 U/L    Total Protein 7.5 6.4 - 8.2 g/dL    AST 15 9 - 39 U/L    Bilirubin, Total 0.6 0.0 - 1.2 mg/dL    ALT 20 7 - 45 U/L   Urinalysis with Reflex Culture and Microscopic   Result Value Ref Range    Color, Urine Light-Yellow Light-Yellow, Yellow, Dark-Yellow    Appearance, Urine Clear Clear    Specific Gravity, Urine 1.016 1.005 - 1.035    pH, Urine 6.5 5.0, 5.5, 6.0, 6.5, 7.0, 7.5, 8.0    Protein, Urine 20 (TRACE) NEGATIVE, 10 (TRACE), 20  (TRACE) mg/dL    Glucose, Urine Normal Normal mg/dL    Blood, Urine 0.03 (TRACE) (A) NEGATIVE    Ketones, Urine 40 (2+) (A) NEGATIVE mg/dL    Bilirubin, Urine NEGATIVE NEGATIVE    Urobilinogen, Urine Normal Normal mg/dL    Nitrite, Urine NEGATIVE NEGATIVE    Leukocyte Esterase, Urine 250 Carlos/uL (A) NEGATIVE   Microscopic Only, Urine   Result Value Ref Range    WBC, Urine 21-50 (A) 1-5, NONE /HPF    RBC, Urine 6-10 (A) NONE, 1-2, 3-5 /HPF    Squamous Epithelial Cells, Urine 1-9 (SPARSE) Reference range not established. /HPF    Bacteria, Urine 1+ (A) NONE SEEN /HPF    Mucus, Urine FEW Reference range not established. /LPF   Lactate   Result Value Ref Range    Lactate 1.2 0.4 - 2.0 mmol/L     US PELVIS OB TRANSABDOMINAL W TRANSVAGINAL UP TO 1ST TRIMESTER    Result Date: 1/25/2025  Interpreted By:  Franci Rios, STUDY: US PELVIS OB TRANSABDOMINAL W TRANSVAGINAL UP TO 1ST TRIMESTER 1/25/2025 12:42 pm   INDICATION: 33 y/o   F with  Signs/Symptoms:left abd pain.     COMPARISON: None.   ACCESSION NUMBER(S): [id number]   ORDERING CLINICIAN: ZAC NOYOLA   TECHNIQUE: Transabdominal and transvaginal pelvic ultrasound was performed.   FINDINGS: UTERUS AND GESTATIONAL SAC:   Intrauterine gestational sac containing a yolk sac and single fetal pole with average crown-rump length of 16 mm which correlates with gestational age of 8 weeks 0 days +/-5 days. Fetal cardiac activity seen with heart rate of 160 beats per minute. No fluid collection around the gestational sac or focal uterine mass lesion demonstrated.   ADNEXA/OVARIES:   Right ovary  measures 2.6 x 1.7 x 2.2 cm. Intact flow on limited Doppler evaluation.   Left ovary  is not visualized.   Trace free fluid in the cul-de-sac, likely physiologic.       Single live intrauterine pregnancy with estimated gestational age of 8 weeks 0 days +/-5 days. Ultrasound [facility] is 09/06/2025. Based on LMP of 11/23/2024 the current gestational age would be 9 weeks 0 days.   Nonvisualization  of the left ovary.   MACRO: None.   Signed by: Franci Rios 1/25/2025 12:51 PM Dictation workstation:   [id number]    US renal complete    Result Date: 1/25/2025  Interpreted By:  Ray Roger, STUDY: US RENAL COMPLETE;  1/25/2025 12:14 pm   INDICATION: Signs/Symptoms:left flank pain.     COMPARISON: None.   ACCESSION NUMBER(S): [id number]   ORDERING CLINICIAN: ZAC NOYOLA   TECHNIQUE: Multiple images of the kidneys were obtained  .   FINDINGS: RIGHT KIDNEY: The right kidney measures 10.9 cm in length. The renal cortical echogenicity and thickness are within normal limits. No hydronephrosis. A few echogenic nephroliths may be present measuring up to 8 mm. The adjacent liver is echogenic suggestive of steatosis.   LEFT KIDNEY: The left kidney measures 12.1 cm in length. The renal cortical echogenicity and thickness are within normal limits. Mild hydronephrosis. 5 mm nephrolith is suspected..   BLADDER: Partially distended. Bilateral ureteral jets were observed.         Mild left hydronephrosis. Suspect bilateral nephroliths. Suspect hepatic steatosis.   MACRO: None   Signed by: Ray Roger 1/25/2025 12:23 PM Dictation workstation:   [id number]        Assessment/Plan   Assessment & Plan  Obstructed, uropathy    UTI (urinary tract infection)    8 weeks gestation of pregnancy ([facility])      IV antibiotic  IV fluids  Pain medication  Consult urology  Consult OB/GYN  See orders for details       I spent  minutes in the professional and overall care of this patient.      Slime Morris MD

## 2025-01-26 NOTE — PROGRESS NOTES
"Breanna Jackson \"Sheldon" is a 34 y.o. female on day 1 of admission presenting with Obstructed, uropathy.    Subjective   Patient still having a lot of pain in the left flank area.  Complaining of nausea       Objective     Physical Exam  Vitals reviewed.   Constitutional:       Appearance: Normal appearance.   HENT:      Head: Normocephalic and atraumatic.      Right Ear: Tympanic membrane, ear canal and external ear normal.      Left Ear: Tympanic membrane, ear canal and external ear normal.      Nose: Nose normal.      Mouth/Throat:      Pharynx: Oropharynx is clear.   Eyes:      Extraocular Movements: Extraocular movements intact.      Conjunctiva/sclera: Conjunctivae normal.      Pupils: Pupils are equal, round, and reactive to light.   Cardiovascular:      Rate and Rhythm: Normal rate and regular rhythm.      Pulses: Normal pulses.      Heart sounds: Normal heart sounds.   Pulmonary:      Effort: Pulmonary effort is normal.      Breath sounds: Normal breath sounds.   Abdominal:      General: Abdomen is flat. Bowel sounds are normal.      Palpations: Abdomen is soft.      Tenderness: There is abdominal tenderness.      Comments: Tenderness in left flank area    Musculoskeletal:      Cervical back: Normal range of motion and neck supple.   Skin:     General: Skin is warm and dry.   Neurological:      General: No focal deficit present.      Mental Status: She is alert and oriented to person, place, and time.   Psychiatric:         Mood and Affect: Mood normal.         Last Recorded Vitals  Blood pressure 97/66, pulse (!) 118, temperature 37 °C (98.6 °F), resp. rate 16, height 1.575 m (5' 2.01\"), weight 90.7 kg (200 lb), last menstrual period 11/23/2024, SpO2 97%.  Intake/Output last 3 Shifts:  I/O last 3 completed shifts:  In: 961.7 (10.6 mL/kg) [P.O.:400; IV Piggyback:561.7]  Out: 900 (9.9 mL/kg) [Urine:900 (0.3 mL/kg/hr)]  Weight: 90.7 kg     Relevant Results               Scheduled medications  cefTRIAXone, 1 g, " intravenous, q24h  docusate sodium, 100 mg, oral, BID  polyethylene glycol, 17 g, oral, Daily      Continuous medications  potassium zgyeick-L8-4.45%NaCl, 100 mL/hr, Last Rate: 100 mL/hr (01/26/25 1146)      PRN medications  PRN medications: acetaminophen **OR** acetaminophen **OR** acetaminophen, benzocaine-menthol, dextromethorphan-guaifenesin, guaiFENesin, morphine, ondansetron ODT **OR** ondansetron  Results for orders placed or performed during the hospital encounter of 01/25/25 (from the past 24 hours)   CBC   Result Value Ref Range    WBC 19.1 (H) 4.4 - 11.3 x10*3/uL    nRBC 0.0 0.0 - 0.0 /100 WBCs    RBC 4.16 4.00 - 5.20 x10*6/uL    Hemoglobin 12.2 12.0 - 16.0 g/dL    Hematocrit 35.7 (L) 36.0 - 46.0 %    MCV 86 80 - 100 fL    MCH 29.3 26.0 - 34.0 pg    MCHC 34.2 32.0 - 36.0 g/dL    RDW 12.8 11.5 - 14.5 %    Platelets 241 150 - 450 x10*3/uL   Comprehensive metabolic panel   Result Value Ref Range    Glucose 130 (H) 74 - 99 mg/dL    Sodium 131 (L) 136 - 145 mmol/L    Potassium 3.5 3.5 - 5.3 mmol/L    Chloride 102 98 - 107 mmol/L    Bicarbonate 19 (L) 21 - 32 mmol/L    Anion Gap 14 10 - 20 mmol/L    Urea Nitrogen 6 6 - 23 mg/dL    Creatinine 0.87 0.50 - 1.05 mg/dL    eGFR 90 >60 mL/min/1.73m*2    Calcium 8.4 (L) 8.6 - 10.3 mg/dL    Albumin 3.8 3.4 - 5.0 g/dL    Alkaline Phosphatase 49 33 - 110 U/L    Total Protein 6.5 6.4 - 8.2 g/dL    AST 13 9 - 39 U/L    Bilirubin, Total 0.5 0.0 - 1.2 mg/dL    ALT 16 7 - 45 U/L     CT abdomen pelvis wo IV contrast    Result Date: 1/26/2025  Interpreted By:  Miosés Wen, STUDY: CT ABDOMEN PELVIS WO IV CONTRAST;  1/26/2025 11:40 am   INDICATION: Signs/Symptoms:hydronephrosis.     COMPARISON: Pelvic ultrasound 25 January 2025; CT abdomen and pelvis without contrast 13 June 2018   ACCESSION NUMBER(S): [id number]   ORDERING CLINICIAN: SALLY SUH   TECHNIQUE: CT of the abdomen and pelvis from the lung bases through the symphysis pubis without oral or IV contrast   FINDINGS:  LOWER CHEST: No acute airspace disease.   BONES: No acute skeletal findings.   RIGHT KIDNEY AND URETER: Radiodense stone: Positive, but no obstructing/offending stone. Only six or seven small nonobstructing renal stones. None in the ureter Hydronephrosis: Negative Congenital variant anatomy: Negative. No duplicated ureter or other variant anatomy. Other: n/a   LEFT KIDNEY AND URETER: Radiodense stone: Positive, including an obstructing/offending stone. In the proximal ureter not far distal from the UPJ, about 3.5 cm above the level of the pelvic inlet and approximately 16-18 cm proximal to the UVJ, a 3-4 mm long but too thin to measure attenuation obstructing stone. No others in the ureter. In the kidney, six or seven less than 3 mm (too small to accurately measure attenuation) nonobstructing stones Hydronephrosis: Negative Congenital variant anatomy: Negative. No duplicated ureter or other variant anatomy. Other: n/a   URINARY BLADDER: Radiodense stone: Negative Wall thickening / other inflammatory change: Negative Diverticula: Negative Congenital variant anatomy: Negative. No variant anatomy such as urachal remnant. Other: n/a   LIVER: Diffusely mildly fatty with one superimposed region of focal increased fatty deposition in a characteristic location near the gallbladder fossa. No obvious focal abnormality on this exam without contrast   SPLEEN: Normal. No enlargement.   PANCREAS: Normal. No CT evidence of acute or chronic pancreatitis. No obvious  duct dilation. No gross evidence of a mass, noting low sensitivity and specificity without IV contrast.   GALLBLADDER: Normal CT appearance. No dilation, calcified, or gas-containing stones.  Other types of gallstones could be occult on CT and detectable only by ultrasound.   BILE DUCTS: Normal. No biliary duct dilation.   ADRENAL GLANDS: Normal. No nodule or mass.   LYMPH NODES: No adenopathy, intraperitoneal, retroperitoneal, pelvic, inguinal or otherwise.   APPENDIX:  Normal.  Not dilated, thick walled or in any other way inflamed in appearance.  No inflammatory change about the appendix.   COLON: Normal. No sign of acute diverticulitis or other colitis. No annular constricting mass.   SMALL BOWEL: Normal. No small bowel dilation or any other sign of small bowel obstruction.   STOMACH / DUODENUM: Grossly normal by CT which has limited sensitivity and specificity for the stomach and duodenum.   RETROPERITONEUM: Normal.  No acute hemorrhage or inflammatory change. Lymph nodes in a separate dedicated section.   OMENTUM, MESENTERY AND PERITONEAL SPACES: Free intraperitoneal air: Negative Free intraperitoneal fluid: Negative Abscess: Negative Other: n/a   PELVIS: Early gravid uterus   VASCULATURE: Unenhanced CT appearance within normal limits. No abdominal aortic aneurysm or other acute finding.   ABDOMINAL WALL: Hernia: Negative Other: No acute or contributory abnormality.       3-4 mm long but too thin to accurately measure attenuation obstructing proximal left ureteral stone causing mild left hydronephrosis and perinephric edema   No other acute findings   MACRO: None   Signed by: Moisés Wen 1/26/2025 11:58 AM Dictation workstation:   [id number]    US PELVIS OB TRANSABDOMINAL W TRANSVAGINAL UP TO 1ST TRIMESTER    Result Date: 1/25/2025  Interpreted By:  Franci Rios, STUDY: US PELVIS OB TRANSABDOMINAL W TRANSVAGINAL UP TO 1ST TRIMESTER 1/25/2025 12:42 pm   INDICATION: 35 y/o   F with  Signs/Symptoms:left abd pain.     COMPARISON: None.   ACCESSION NUMBER(S): [id number]   ORDERING CLINICIAN: ZAC NOYOLA   TECHNIQUE: Transabdominal and transvaginal pelvic ultrasound was performed.   FINDINGS: UTERUS AND GESTATIONAL SAC:   Intrauterine gestational sac containing a yolk sac and single fetal pole with average crown-rump length of 16 mm which correlates with gestational age of 8 weeks 0 days +/-5 days. Fetal cardiac activity seen with heart rate of 160 beats per minute. No fluid  collection around the gestational sac or focal uterine mass lesion demonstrated.   ADNEXA/OVARIES:   Right ovary  measures 2.6 x 1.7 x 2.2 cm. Intact flow on limited Doppler evaluation.   Left ovary  is not visualized.   Trace free fluid in the cul-de-sac, likely physiologic.       Single live intrauterine pregnancy with estimated gestational age of 8 weeks 0 days +/-5 days. Ultrasound [facility] is 09/06/2025. Based on LMP of 11/23/2024 the current gestational age would be 9 weeks 0 days.   Nonvisualization of the left ovary.   MACRO: None.   Signed by: Franci Rios 1/25/2025 12:51 PM Dictation workstation:   [id number]    US renal complete    Result Date: 1/25/2025  Interpreted By:  Ray Roger, STUDY: US RENAL COMPLETE;  1/25/2025 12:14 pm   INDICATION: Signs/Symptoms:left flank pain.     COMPARISON: None.   ACCESSION NUMBER(S): [id number]   ORDERING CLINICIAN: ZAC NOYOLA   TECHNIQUE: Multiple images of the kidneys were obtained  .   FINDINGS: RIGHT KIDNEY: The right kidney measures 10.9 cm in length. The renal cortical echogenicity and thickness are within normal limits. No hydronephrosis. A few echogenic nephroliths may be present measuring up to 8 mm. The adjacent liver is echogenic suggestive of steatosis.   LEFT KIDNEY: The left kidney measures 12.1 cm in length. The renal cortical echogenicity and thickness are within normal limits. Mild hydronephrosis. 5 mm nephrolith is suspected..   BLADDER: Partially distended. Bilateral ureteral jets were observed.         Mild left hydronephrosis. Suspect bilateral nephroliths. Suspect hepatic steatosis.   MACRO: None   Signed by: Ray Roger 1/25/2025 12:23 PM Dictation workstation:   [id number]                  Assessment/Plan   Assessment & Plan  Obstructed, uropathy    UTI (urinary tract infection)    8 weeks gestation of pregnancy ([facility])    Neurology input noted  Plan for CT  Plan for stent placement based on the results  Increase Zofran dose  Continue pain  medication  Continue IV fluids       I spent  minutes in the professional and overall care of this patient.      Slime Morris MD

## 2025-01-26 NOTE — CARE PLAN
Problem: Pain - Adult  Goal: Verbalizes/displays adequate comfort level or baseline comfort level  Outcome: Progressing   The patient's goals for the shift include      The clinical goals for the shift include patient comfort, manage pain and nausea

## 2025-01-26 NOTE — CONSULTS
Obstetrical Consult    Reason for Consult: Discuss CT scan modality in pregnancy    Assessment/Plan    Nephrolithiasis  Flank Pain  N/V  Leukocytosis  9 weeks gestational age.     Reviewed ACOG guidelines with her. CT pelvimetry can have modified exposure for pregnant conditions. Oral contrast not absorbed. IV contrast only if necessary.  Weighed risks and benefits for her condition. She is willing to move forward.     Agree with IV antibiotic. Urine culture.     Pain management    Alerted Dr. De Guzman of patient comliance with recommendation. Treatment plan per urology.     Patient advised to FU for initiation of OB care with Dr. Wise following discharge. Dating US completed 25.     Subjective   34 y.o.  female presenting with flank pain, nausea and vomiting.  Patient is 9 weeks pregnant.  Diagnosed in the emergency room with obstructive uropathy. 2nd visit due to increasing pain and vomiting. Urology consulted, abnormal renal ultrasound needs further investigation by CT scan. Patient wishes to discuss risks in pregnancy. Spoke with Dr. De Guzman and wanting opinion of OB.    Patient has leukocytosis.. Tachycardic since admission. Afebrile.   She was started on Rocephin last evening.      Obstetrical History   OB History    Para Term  AB Living   1             SAB IAB Ectopic Multiple Live Births                  # Outcome Date GA Lbr Efrain/2nd Weight Sex Type Anes PTL Lv   1 Current                Past Medical History  Past Medical History:   Diagnosis Date    Encounter for full-term uncomplicated delivery      (spontaneous vaginal delivery)    Encounter for supervision of normal pregnancy, unspecified, first trimester     Encounter for pregnancy related examination in first trimester    Encounter for supervision of normal pregnancy, unspecified, first trimester     Encounter for pregnancy related examination in first trimester    Other specified health status     No pertinent past medical  "history    Other specified health status 10/10/2019    No pertinent past surgical history    Personal history of urinary calculi     History of kidney stones    Unspecified infection of urinary tract in pregnancy, second trimester ([facility]) 09/26/2019    UTI (urinary tract infection) in pregnancy in second trimester        Past Surgical History   Past Surgical History:   Procedure Laterality Date    OTHER SURGICAL HISTORY  07/23/2020    Oral surgery    OTHER SURGICAL HISTORY  06/10/2019    Repair of bladder       Social History  Social History     Tobacco Use    Smoking status: Never    Smokeless tobacco: Never   Substance Use Topics    Alcohol use: Never     Substance and Sexual Activity   Drug Use Never       Allergies  Amoxicillin, Penicillins, Prednisone, and Latex     Medications  Medications Prior to Admission   Medication Sig Dispense Refill Last Dose/Taking    albuterol 90 mcg/actuation inhaler Inhale 2 puffs 4 times a day. 36 g 1     ergocalciferol (Vitamin D-2) 1.25 MG (31533 UT) capsule Take 1 capsule (1,250 mcg) by mouth 1 (one) time per week. 4 capsule 11     nitrofurantoin, macrocrystal-monohydrate, (Macrobid) 100 mg capsule Take 1 capsule (100 mg) by mouth 2 times a day for 5 days. 10 capsule 0     prenatal no115/iron/folic acid (PRENATAL 19 ORAL) Take by mouth.       tamsulosin (Flomax) 0.4 mg 24 hr capsule Take 1 capsule (0.4 mg) by mouth once daily. 7 capsule 0        Objective    Last Vitals  Temp Pulse Resp BP MAP O2 Sat   37.2 °C (99 °F) (!) 128 15 113/72 86 94 %     Physical Examination  General:  Obvious malaise. Elevated BMI.  Pleasant.  HEENT:  Normocephalic, atraumatic head.   Lungs: Normal effort.   Neuro: AAOx3 in NAD    Lab Review  No results found for: \"ABO\", \"LABRH\", \"ABSCRN\"  Lab Results   Component Value Date    WBC 19.1 (H) 01/26/2025    HGB 12.2 01/26/2025    HCT 35.7 (L) 01/26/2025     01/26/2025     Lab Results   Component Value Date    GLUCOSE 130 (H) 01/26/2025     " "(L) 01/26/2025    K 3.5 01/26/2025     01/26/2025    CO2 19 (L) 01/26/2025    ANIONGAP 14 01/26/2025    BUN 6 01/26/2025    CREATININE 0.87 01/26/2025    EGFR 90 01/26/2025    CALCIUM 8.4 (L) 01/26/2025    ALBUMIN 3.8 01/26/2025    PROT 6.5 01/26/2025    ALKPHOS 49 01/26/2025    ALT 16 01/26/2025    AST 13 01/26/2025    BILITOT 0.5 01/26/2025     No results found for: \"UTPCR\"  No results found for: \"LACTATE\"    Radiology reviewed: Yes - 1/25/25   Mild left hydronephrosis. Suspect bilateral nephroliths.  Normal IUP at 8.5 wks by US (9 wks by LMP)     "

## 2025-01-26 NOTE — ED PROVIDER NOTES
HPI   Chief Complaint   Patient presents with    Flank Pain    Nausea       Patient is a 34-year-old female G1, P0 presenting to the emergency department for evaluation of left flank pain.  Patient is approximately 8 to 9 weeks pregnant.  She states she was in the emergency department earlier and diagnosed with a kidney stone.  She states she was given Tylenol which helped with the pain and she was discharged home.  She states when she got home she had increased pain and took more Tylenol however did not get any relief.  She states she is now vomiting and very uncomfortable due to the pain.  She denies any difficulty urinating or blood in her urine.  She denies any chest pain, shortness of breath, fevers, chills.              Patient History   Past Medical History:   Diagnosis Date    Encounter for full-term uncomplicated delivery      (spontaneous vaginal delivery)    Encounter for supervision of normal pregnancy, unspecified, first trimester     Encounter for pregnancy related examination in first trimester    Encounter for supervision of normal pregnancy, unspecified, first trimester     Encounter for pregnancy related examination in first trimester    Other specified health status     No pertinent past medical history    Other specified health status 10/10/2019    No pertinent past surgical history    Personal history of urinary calculi     History of kidney stones    Unspecified infection of urinary tract in pregnancy, second trimester ([facility]) 2019    UTI (urinary tract infection) in pregnancy in second trimester     Past Surgical History:   Procedure Laterality Date    OTHER SURGICAL HISTORY  2020    Oral surgery    OTHER SURGICAL HISTORY  06/10/2019    Repair of bladder     Family History   Problem Relation Name Age of Onset    Other (Cardiac Disorder) Mother      Hypothyroidism Mother      Breast cancer Mother      Uterine cancer Mother      Heart murmur Son      Other (LSVC (Persistent left  superior vena cava)) Son      Other (LSVC (persistent left suprior vena cava)) Other Child      Social History     Tobacco Use    Smoking status: Never    Smokeless tobacco: Never   Substance Use Topics    Alcohol use: Never    Drug use: Never       Physical Exam   ED Triage Vitals [01/25/25 1959]   Temperature Heart Rate Respirations BP   37.2 °C (98.9 °F) (!) 134 16 117/85      Pulse Ox Temp Source Heart Rate Source Patient Position   100 % Oral -- --      BP Location FiO2 (%)     -- --       Physical Exam  Vitals and nursing note reviewed.   Constitutional:       General: She is not in acute distress.     Appearance: Normal appearance. She is not ill-appearing or toxic-appearing.   HENT:      Head: Normocephalic and atraumatic.      Nose: Nose normal.      Mouth/Throat:      Mouth: Mucous membranes are moist.   Eyes:      Extraocular Movements: Extraocular movements intact.      Pupils: Pupils are equal, round, and reactive to light.   Cardiovascular:      Rate and Rhythm: Regular rhythm. Tachycardia present.   Pulmonary:      Effort: Pulmonary effort is normal.      Breath sounds: Normal breath sounds.   Abdominal:      Palpations: Abdomen is soft.      Tenderness: There is abdominal tenderness (Left lower quadrant). There is left CVA tenderness. There is no guarding or rebound.   Musculoskeletal:         General: Normal range of motion.      Cervical back: Normal range of motion.   Skin:     General: Skin is warm and dry.   Neurological:      General: No focal deficit present.      Mental Status: She is alert and oriented to person, place, and time.   Psychiatric:         Mood and Affect: Mood normal.         Behavior: Behavior normal.           ED Course & MDM   Diagnoses as of 01/25/25 6954   Acute left flank pain                 No data recorded     Saniya Coma Scale Score: 15 (01/25/25 2000 : Dex Sullivan RN)                           Medical Decision Making  **Disclaimer parts of this chart have  been completed using voice recognition software. Please excuse any errors of transcription.     Patient seen in conjunction with attending physician Dr. Russell.    HPI: Detailed above.    Exam: A medically appropriate exam performed, outlined above, given the known history and presentation.    History obtained from: Patient    Labs/Diagnostics:  Labs Reviewed   CBC WITH AUTO DIFFERENTIAL - Abnormal       Result Value    WBC 16.0 (*)     nRBC 0.0      RBC 4.59      Hemoglobin 13.6      Hematocrit 39.1      MCV 85      MCH 29.6      MCHC 34.8      RDW 12.6      Platelets 248      Neutrophils % 88.7      Immature Granulocytes %, Automated 0.3      Lymphocytes % 4.9      Monocytes % 5.8      Eosinophils % 0.0      Basophils % 0.3      Neutrophils Absolute 14.20 (*)     Immature Granulocytes Absolute, Automated 0.04      Lymphocytes Absolute 0.79 (*)     Monocytes Absolute 0.93      Eosinophils Absolute 0.00      Basophils Absolute 0.04     COMPREHENSIVE METABOLIC PANEL - Abnormal    Glucose 124 (*)     Sodium 134 (*)     Potassium 3.6      Chloride 102      Bicarbonate 20 (*)     Anion Gap 16      Urea Nitrogen 9      Creatinine 0.82      eGFR >90      Calcium 9.4      Albumin 4.4      Alkaline Phosphatase 55      Total Protein 7.5      AST 15      Bilirubin, Total 0.6      ALT 20     URINALYSIS WITH REFLEX CULTURE AND MICROSCOPIC - Abnormal    Color, Urine Light-Yellow      Appearance, Urine Clear      Specific Gravity, Urine 1.016      pH, Urine 6.5      Protein, Urine 20 (TRACE)      Glucose, Urine Normal      Blood, Urine 0.03 (TRACE) (*)     Ketones, Urine 40 (2+) (*)     Bilirubin, Urine NEGATIVE      Urobilinogen, Urine Normal      Nitrite, Urine NEGATIVE      Leukocyte Esterase, Urine 250 Carlos/uL (*)    MICROSCOPIC ONLY, URINE - Abnormal    WBC, Urine 21-50 (*)     RBC, Urine 6-10 (*)     Squamous Epithelial Cells, Urine 1-9 (SPARSE)      Bacteria, Urine 1+ (*)     Mucus, Urine FEW     URINE CULTURE   BLOOD  CULTURE   BLOOD CULTURE   URINALYSIS WITH REFLEX CULTURE AND MICROSCOPIC    Narrative:     The following orders were created for panel order Urinalysis with Reflex Culture and Microscopic.  Procedure                               Abnormality         Status                     ---------                               -----------         ------                     Urinalysis with Reflex C...[[id number]]  Abnormal            Final result               Extra Urine Gray Tube[[id number]]                            In process                   Please view results for these tests on the individual orders.   EXTRA URINE GRAY TUBE   LACTATE     EMERGENCY DEPARTMENT COURSE and DIFFERENTIAL DIAGNOSIS/MDM:  Patient is a 34-year-old female presenting to the emergency department for evaluation of left flank pain.  On physical exam vital signs remarkable for tachycardia but otherwise stable and patient is in no acute distress.  Patient appears uncomfortable due to the pain in her left flank region.  She has left CVA tenderness with mild tenderness to palpation left lower quadrant.  Patient discharged in the emergency department earlier after being diagnosed with a kidney stone.  Patient had renal ultrasound performed as well as a pelvic ultrasound.  Pelvic ultrasound showed a single live intrauterine pregnancy measuring 8 weeks ±5 days.  Renal ultrasound showed mild left hydronephrosis with bilateral nephroliths and hepatic steatosis.  Suspect patient's pain is likely due to a kidney stone on the left.  I spoke with patient's primary care physician Dr. Morris who requested that I transfer the patient to [facility] for OB/GYN to be on consult.  CMP showed normal kidney function with a sodium of 134 and bicarb 20 as well as a glucose of 124.  Urine showed evidence of infection with 200 leukocyte esterase and 1+ bacteria as well as trace blood.  CBC showed a leukocytosis which is new from previous labs 11 hours ago.  Urology was consulted  "and made aware of the patient being transferred to [facility] via [employer] chat.  I spoke with attending physician Dr. Morris who agreed to accept the patient at [facility].  Ceftriaxone was ordered for antibiotics as well as septic workup given the patient's tachycardia and leukocytosis.    The patient presented with a chief complaint of left leg pain. The differential diagnosis associated with this patient's presentation includes kidney stone, UTI/pyonephritis, septic stone, sepsis, dehydration.     SEP-1 CORE MEASURE DATA    Visit Vitals  BP (!) 136/92   Pulse (!) 117   Temp 37.2 °C (98.9 °F) (Oral)   Resp 18        WBC   Date/Time Value Ref Range Status   01/25/2025 08:48 PM 16.0 (H) 4.4 - 11.3 x10*3/uL Final     Lactate   Date/Time Value Ref Range Status   03/21/2022 09:54 AM 1.0 0.4 - 2.0 mmol/L Final     Comment:      Venipuncture immediately after or during the    administration of Metamizole may lead to falsely   low results. Testing should be performed immediately   prior to Metamizole dosing.       Creatinine   Date/Time Value Ref Range Status   01/25/2025 08:48 PM 0.82 0.50 - 1.05 mg/dL Final     Bilirubin, Total   Date/Time Value Ref Range Status   01/25/2025 08:48 PM 0.6 0.0 - 1.2 mg/dL Final     INR   Date/Time Value Ref Range Status   07/23/2018 11:19 AM 0.9 0.86 - 1.16 Final     Comment:     INR Therapeutic Range: 2.0-3.5     Platelets   Date/Time Value Ref Range Status   01/25/2025 08:48  150 - 450 x10*3/uL Final        Fluid Resuscitation Rationale: at least 30mL/kg based on entered actual body weight at time of triage    I performed a sepsis reperfusion exam on Breanna Jackson on 01/25/25 at 2200    Vitals:    Vitals:    01/25/25 1959 01/25/25 2054   BP: 117/85 (!) 136/92   Pulse: (!) 134 (!) 117   Resp: 16 18   Temp: 37.2 °C (98.9 °F)    TempSrc: Oral    SpO2: 100% 97%   Weight: 90.7 kg (200 lb)    Height: 1.575 m (5' 2\")        History Limited by:    None    Independent history obtained " from:    None    External records reviewed:    Diagnostic labs and imaging noted from previous emergency department visit today    Diagnostics interpreted by me:    None    Discussions with other clinicians:    Hospitalist/Admitting Team Dr. mojica    Chronic conditions impacting care:    None    Social determinants of health affecting care:    None    Diagnostic tests considered but not performed: None    ED Medications managed:    Medications   sodium chloride 0.9 % bolus 1,503 mL (has no administration in time range)   cefTRIAXone (Rocephin) 2 g in dextrose (iso) IV 50 mL (has no administration in time range)   ondansetron (Zofran) injection 4 mg (4 mg intravenous Given 1/25/25 2042)   morphine injection 2 mg (2 mg intravenous Given 1/25/25 2042)       Prescription drugs considered:    None    Screenings:              Procedure  Procedures     Ellen Mayer PA-C  01/25/25 1151

## 2025-01-26 NOTE — ED TRIAGE NOTES
Was diagnosed with kidney stones earlier, lower back pain and n/v is worse, states she can't keep anything down.

## 2025-01-26 NOTE — CARE PLAN
The patient's goals for the shift include      The clinical goals for the shift include patient comfort, manage pain and nausea

## 2025-01-26 NOTE — CONSULTS
I, Dr. Tania De Guzman, saw this patient via Telehealth today.  The details of the visit are listed below.  This visit was done via video and voice.        History of Present Illness (HPI):  TODAY (25)  Breanna Jackson is a 34 y.o. female presenting virtually for flank pain.  Over the past day or so she has had worsening left sided flank pain.  She is 2 months pregnant.  A renal ultrasound reveals mild hydronephrosis.  She does have a prior history of stone disease, had lithotripsy in the past.  Her urine culture is pending.  She went home from ER, but returned with worsening pain.  She also had nausea, vomiting.         Past Medical History:   Diagnosis Date    Encounter for full-term uncomplicated delivery      (spontaneous vaginal delivery)    Encounter for supervision of normal pregnancy, unspecified, first trimester     Encounter for pregnancy related examination in first trimester    Encounter for supervision of normal pregnancy, unspecified, first trimester     Encounter for pregnancy related examination in first trimester    Other specified health status     No pertinent past medical history    Other specified health status 10/10/2019    No pertinent past surgical history    Personal history of urinary calculi     History of kidney stones    Unspecified infection of urinary tract in pregnancy, second trimester ([facility]) 2019    UTI (urinary tract infection) in pregnancy in second trimester     Past Surgical History:   Procedure Laterality Date    OTHER SURGICAL HISTORY  2020    Oral surgery    OTHER SURGICAL HISTORY  06/10/2019    Repair of bladder     Family History   Problem Relation Name Age of Onset    Other (Cardiac Disorder) Mother      Hypothyroidism Mother      Breast cancer Mother      Uterine cancer Mother      Heart murmur Son      Other (LSVC (Persistent left superior vena cava)) Son      Other (LSVC (persistent left suprior vena cava)) Other Child      Social History     Tobacco  Use   Smoking Status Never   Smokeless Tobacco Never     Current Facility-Administered Medications   Medication Dose Route Frequency Provider Last Rate Last Admin    acetaminophen (Tylenol) tablet 650 mg  650 mg oral q4h PRN Slime Morris MD        Or    acetaminophen (Tylenol) oral liquid 650 mg  650 mg oral q4h PRN Slime Morris MD        Or    acetaminophen (Tylenol) suppository 650 mg  650 mg rectal q4h PRN Slime Morris MD        benzocaine-menthol (Cepastat Sore Throat) lozenge 1 lozenge  1 lozenge Mouth/Throat q2h PRN Slime Morris MD        cefTRIAXone (Rocephin) 1 g in dextrose (iso) IV 50 mL  1 g intravenous q24h Slime Morris MD        dextromethorphan-guaifenesin (Robitussin DM)  mg/5 mL oral liquid 5 mL  5 mL oral q4h PRN Slime Morris MD        dextrose 5 % and sodium chloride 0.45 % with KCl 20 mEq/L infusion  100 mL/hr intravenous Continuous Slime Morris  mL/hr at 01/26/25 0038 100 mL/hr at 01/26/25 0038    docusate sodium (Colace) capsule 100 mg  100 mg oral BID Slime Morris MD   100 mg at 01/26/25 0041    guaiFENesin (Mucinex) 12 hr tablet 600 mg  600 mg oral q12h PRN Slime Morris MD        morphine injection 2 mg  2 mg intravenous q4h PRN Slime Morris MD   2 mg at 01/26/25 0537    ondansetron ODT (Zofran-ODT) disintegrating tablet 4 mg  4 mg oral q8h PRN Slime Morris MD        Or    ondansetron (Zofran) injection 4 mg  4 mg intravenous q8h PRN Slime Morris MD   4 mg at 01/26/25 0050    polyethylene glycol (Glycolax, Miralax) packet 17 g  17 g oral Daily Slime Morris MD         Allergies   Allergen Reactions    Amoxicillin Anaphylaxis and Hives    Penicillins Anaphylaxis and Hives    Prednisone Anaphylaxis and Hives    Latex Hives     Past medical, surgical, family and social history in the chart was reviewed and is accurate including any additions to what is in this HPI.    Review of systems (ROS):   Pertinent information as listed in the HPI.     Objective   Visit Vitals  /72 (BP  Location: Left arm, Patient Position: Lying)   Pulse (!) 128   Temp 37.2 °C (99 °F) (Temporal)   Resp 15     PHYSICAL EXAM:  Exam limited 2/2 telehealth visit.     Lab Review  CBC, BMP, urinalysis    ASSESSMENT:  Problem List Items Addressed This Visit          Genitourinary and Reproductive    * (Principal) Obstructed, uropathy - Primary      PLAN:  Agree with antibiotics, follow cultures.    U/S reviewed.  I discussed options with patient.  We also discussed getting a CT scan to obtain more information to see if there is a stone in the ureter.  I counseled her on the risks, and also discussed that there is data that shows low dose CT is safe in pregnancy.  I will order a CT stone protocol, will follow up results.        All questions were answered to the patient’s satisfaction.  Patient agrees with the plan and wishes to proceed.  Follow-up will be scheduled appropriately.     Tania De Guzman MD

## 2025-01-27 ENCOUNTER — ANESTHESIA EVENT (OUTPATIENT)
Dept: OPERATING ROOM | Facility: HOSPITAL | Age: 35
End: 2025-01-27
Payer: MEDICAID

## 2025-01-27 ENCOUNTER — APPOINTMENT (OUTPATIENT)
Dept: RADIOLOGY | Facility: HOSPITAL | Age: 35
End: 2025-01-27
Payer: MEDICAID

## 2025-01-27 ENCOUNTER — ANESTHESIA (OUTPATIENT)
Dept: OPERATING ROOM | Facility: HOSPITAL | Age: 35
End: 2025-01-27
Payer: MEDICAID

## 2025-01-27 PROBLEM — Z3A.09 9 WEEKS GESTATION OF PREGNANCY (HHS-HCC): Status: ACTIVE | Noted: 2025-01-25

## 2025-01-27 LAB
BACTERIA UR CULT: NORMAL
BACTERIA UR CULT: NORMAL

## 2025-01-27 PROCEDURE — 0T778DZ DILATION OF LEFT URETER WITH INTRALUMINAL DEVICE, VIA NATURAL OR ARTIFICIAL OPENING ENDOSCOPIC: ICD-10-PCS | Performed by: SURGERY

## 2025-01-27 PROCEDURE — C2617 STENT, NON-COR, TEM W/O DEL: HCPCS | Performed by: SURGERY

## 2025-01-27 PROCEDURE — 99233 SBSQ HOSP IP/OBS HIGH 50: CPT | Performed by: INTERNAL MEDICINE

## 2025-01-27 PROCEDURE — 76942 ECHO GUIDE FOR BIOPSY: CPT

## 2025-01-27 PROCEDURE — 2500000001 HC RX 250 WO HCPCS SELF ADMINISTERED DRUGS (ALT 637 FOR MEDICARE OP): Performed by: INTERNAL MEDICINE

## 2025-01-27 PROCEDURE — 1100000001 HC PRIVATE ROOM DAILY

## 2025-01-27 PROCEDURE — 7100000002 HC RECOVERY ROOM TIME - EACH INCREMENTAL 1 MINUTE: Performed by: SURGERY

## 2025-01-27 PROCEDURE — 3700000001 HC GENERAL ANESTHESIA TIME - INITIAL BASE CHARGE: Performed by: SURGERY

## 2025-01-27 PROCEDURE — 2720000007 HC OR 272 NO HCPCS: Performed by: SURGERY

## 2025-01-27 PROCEDURE — A52332 PR CYSTOSCOPY,INSERT URETERAL STENT: Performed by: ANESTHESIOLOGY

## 2025-01-27 PROCEDURE — 2500000004 HC RX 250 GENERAL PHARMACY W/ HCPCS (ALT 636 FOR OP/ED): Performed by: SURGERY

## 2025-01-27 PROCEDURE — 2500000004 HC RX 250 GENERAL PHARMACY W/ HCPCS (ALT 636 FOR OP/ED): Performed by: INTERNAL MEDICINE

## 2025-01-27 PROCEDURE — 7100000001 HC RECOVERY ROOM TIME - INITIAL BASE CHARGE: Performed by: SURGERY

## 2025-01-27 PROCEDURE — 94762 N-INVAS EAR/PLS OXIMTRY CONT: CPT

## 2025-01-27 PROCEDURE — 3600000003 HC OR TIME - INITIAL BASE CHARGE - PROCEDURE LEVEL THREE: Performed by: SURGERY

## 2025-01-27 PROCEDURE — 3700000002 HC GENERAL ANESTHESIA TIME - EACH INCREMENTAL 1 MINUTE: Performed by: SURGERY

## 2025-01-27 PROCEDURE — A52332 PR CYSTOSCOPY,INSERT URETERAL STENT: Performed by: NURSE ANESTHETIST, CERTIFIED REGISTERED

## 2025-01-27 PROCEDURE — C1769 GUIDE WIRE: HCPCS | Performed by: SURGERY

## 2025-01-27 PROCEDURE — 52332 CYSTOSCOPY AND TREATMENT: CPT | Performed by: SURGERY

## 2025-01-27 PROCEDURE — 2500000005 HC RX 250 GENERAL PHARMACY W/O HCPCS: Performed by: SURGERY

## 2025-01-27 PROCEDURE — 3600000008 HC OR TIME - EACH INCREMENTAL 1 MINUTE - PROCEDURE LEVEL THREE: Performed by: SURGERY

## 2025-01-27 PROCEDURE — 2500000004 HC RX 250 GENERAL PHARMACY W/ HCPCS (ALT 636 FOR OP/ED): Performed by: NURSE ANESTHETIST, CERTIFIED REGISTERED

## 2025-01-27 PROCEDURE — 2500000005 HC RX 250 GENERAL PHARMACY W/O HCPCS: Performed by: OBSTETRICS & GYNECOLOGY

## 2025-01-27 PROCEDURE — 87086 URINE CULTURE/COLONY COUNT: CPT | Mod: TRILAB | Performed by: NURSE PRACTITIONER

## 2025-01-27 PROCEDURE — 2500000001 HC RX 250 WO HCPCS SELF ADMINISTERED DRUGS (ALT 637 FOR MEDICARE OP): Performed by: SURGERY

## 2025-01-27 PROCEDURE — 2780000003 HC OR 278 NO HCPCS: Performed by: SURGERY

## 2025-01-27 DEVICE — INLAY OPTIMA URETERAL STENT W/O GUIDEWIRE
Type: IMPLANTABLE DEVICE | Site: URETER | Status: FUNCTIONAL
Brand: BARD® INLAY OPTIMA® URETERAL STENT

## 2025-01-27 RX ORDER — SODIUM CHLORIDE, SODIUM LACTATE, POTASSIUM CHLORIDE, CALCIUM CHLORIDE 600; 310; 30; 20 MG/100ML; MG/100ML; MG/100ML; MG/100ML
100 INJECTION, SOLUTION INTRAVENOUS CONTINUOUS
Status: DISCONTINUED | OUTPATIENT
Start: 2025-01-27 | End: 2025-01-27 | Stop reason: SDUPTHER

## 2025-01-27 RX ORDER — HYDROMORPHONE HYDROCHLORIDE 0.2 MG/ML
0.2 INJECTION INTRAMUSCULAR; INTRAVENOUS; SUBCUTANEOUS EVERY 5 MIN PRN
Status: DISCONTINUED | OUTPATIENT
Start: 2025-01-27 | End: 2025-01-27 | Stop reason: SDUPTHER

## 2025-01-27 RX ORDER — SODIUM CHLORIDE, SODIUM LACTATE, POTASSIUM CHLORIDE, CALCIUM CHLORIDE 600; 310; 30; 20 MG/100ML; MG/100ML; MG/100ML; MG/100ML
100 INJECTION, SOLUTION INTRAVENOUS CONTINUOUS
Status: DISCONTINUED | OUTPATIENT
Start: 2025-01-27 | End: 2025-01-28 | Stop reason: HOSPADM

## 2025-01-27 RX ORDER — MEPERIDINE HYDROCHLORIDE 25 MG/ML
12.5 INJECTION INTRAMUSCULAR; INTRAVENOUS; SUBCUTANEOUS EVERY 10 MIN PRN
Status: DISCONTINUED | OUTPATIENT
Start: 2025-01-27 | End: 2025-01-28 | Stop reason: HOSPADM

## 2025-01-27 RX ORDER — ALBUTEROL SULFATE 0.83 MG/ML
2.5 SOLUTION RESPIRATORY (INHALATION) ONCE
Status: DISCONTINUED | OUTPATIENT
Start: 2025-01-27 | End: 2025-01-28 | Stop reason: HOSPADM

## 2025-01-27 RX ORDER — MIDAZOLAM HYDROCHLORIDE 1 MG/ML
1 INJECTION, SOLUTION INTRAMUSCULAR; INTRAVENOUS ONCE AS NEEDED
Status: DISCONTINUED | OUTPATIENT
Start: 2025-01-27 | End: 2025-01-27 | Stop reason: SDUPTHER

## 2025-01-27 RX ORDER — PROPOFOL 10 MG/ML
INJECTION, EMULSION INTRAVENOUS AS NEEDED
Status: DISCONTINUED | OUTPATIENT
Start: 2025-01-27 | End: 2025-01-27

## 2025-01-27 RX ORDER — ONDANSETRON HYDROCHLORIDE 2 MG/ML
4 INJECTION, SOLUTION INTRAVENOUS ONCE AS NEEDED
Status: DISCONTINUED | OUTPATIENT
Start: 2025-01-27 | End: 2025-01-27 | Stop reason: SDUPTHER

## 2025-01-27 RX ORDER — LIDOCAINE HYDROCHLORIDE 10 MG/ML
0.1 INJECTION, SOLUTION INFILTRATION; PERINEURAL ONCE
Status: DISCONTINUED | OUTPATIENT
Start: 2025-01-27 | End: 2025-01-28 | Stop reason: HOSPADM

## 2025-01-27 RX ORDER — FENTANYL CITRATE 50 UG/ML
50 INJECTION, SOLUTION INTRAMUSCULAR; INTRAVENOUS EVERY 5 MIN PRN
Status: DISCONTINUED | OUTPATIENT
Start: 2025-01-27 | End: 2025-01-27 | Stop reason: SDUPTHER

## 2025-01-27 RX ORDER — ALBUTEROL SULFATE 0.83 MG/ML
2.5 SOLUTION RESPIRATORY (INHALATION) ONCE
Status: DISCONTINUED | OUTPATIENT
Start: 2025-01-27 | End: 2025-01-27 | Stop reason: SDUPTHER

## 2025-01-27 RX ORDER — ONDANSETRON HYDROCHLORIDE 2 MG/ML
4 INJECTION, SOLUTION INTRAVENOUS ONCE AS NEEDED
Status: DISCONTINUED | OUTPATIENT
Start: 2025-01-27 | End: 2025-01-28 | Stop reason: HOSPADM

## 2025-01-27 RX ORDER — MEPERIDINE HYDROCHLORIDE 25 MG/ML
12.5 INJECTION INTRAMUSCULAR; INTRAVENOUS; SUBCUTANEOUS EVERY 10 MIN PRN
Status: DISCONTINUED | OUTPATIENT
Start: 2025-01-27 | End: 2025-01-27 | Stop reason: SDUPTHER

## 2025-01-27 RX ORDER — HYDROMORPHONE HYDROCHLORIDE 0.2 MG/ML
0.2 INJECTION INTRAMUSCULAR; INTRAVENOUS; SUBCUTANEOUS EVERY 5 MIN PRN
Status: DISCONTINUED | OUTPATIENT
Start: 2025-01-27 | End: 2025-01-28 | Stop reason: HOSPADM

## 2025-01-27 RX ORDER — LIDOCAINE HYDROCHLORIDE 10 MG/ML
0.1 INJECTION, SOLUTION INFILTRATION; PERINEURAL ONCE
Status: DISCONTINUED | OUTPATIENT
Start: 2025-01-27 | End: 2025-01-27 | Stop reason: SDUPTHER

## 2025-01-27 RX ORDER — LIDOCAINE HYDROCHLORIDE 20 MG/ML
JELLY TOPICAL AS NEEDED
Status: DISCONTINUED | OUTPATIENT
Start: 2025-01-27 | End: 2025-01-27 | Stop reason: HOSPADM

## 2025-01-27 RX ORDER — MIDAZOLAM HYDROCHLORIDE 1 MG/ML
1 INJECTION, SOLUTION INTRAMUSCULAR; INTRAVENOUS ONCE AS NEEDED
Status: DISCONTINUED | OUTPATIENT
Start: 2025-01-27 | End: 2025-01-28 | Stop reason: HOSPADM

## 2025-01-27 RX ORDER — MORPHINE SULFATE 1 MG/ML
INJECTION, SOLUTION EPIDURAL; INTRATHECAL; INTRAVENOUS AS NEEDED
Status: DISCONTINUED | OUTPATIENT
Start: 2025-01-27 | End: 2025-01-27

## 2025-01-27 RX ORDER — FENTANYL CITRATE 50 UG/ML
50 INJECTION, SOLUTION INTRAMUSCULAR; INTRAVENOUS EVERY 5 MIN PRN
Status: DISCONTINUED | OUTPATIENT
Start: 2025-01-27 | End: 2025-01-28 | Stop reason: HOSPADM

## 2025-01-27 RX ADMIN — LIDOCAINE 1 PATCH: 4 PATCH TOPICAL at 09:04

## 2025-01-27 RX ADMIN — ACETAMINOPHEN 650 MG: 325 TABLET ORAL at 05:51

## 2025-01-27 RX ADMIN — MORPHINE SULFATE 2 MG: 1 INJECTION, SOLUTION EPIDURAL; INTRATHECAL; INTRAVENOUS at 14:47

## 2025-01-27 RX ADMIN — PROPOFOL 125 MCG/KG/MIN: 10 INJECTION, EMULSION INTRAVENOUS at 14:53

## 2025-01-27 RX ADMIN — MORPHINE SULFATE 2 MG: 2 INJECTION, SOLUTION INTRAMUSCULAR; INTRAVENOUS at 10:08

## 2025-01-27 RX ADMIN — PROPOFOL 20 MG: 10 INJECTION, EMULSION INTRAVENOUS at 14:59

## 2025-01-27 RX ADMIN — ACETAMINOPHEN 650 MG: 325 TABLET ORAL at 19:11

## 2025-01-27 RX ADMIN — MORPHINE SULFATE 2 MG: 2 INJECTION, SOLUTION INTRAMUSCULAR; INTRAVENOUS at 04:32

## 2025-01-27 RX ADMIN — ONDANSETRON 4 MG: 2 INJECTION INTRAMUSCULAR; INTRAVENOUS at 17:16

## 2025-01-27 RX ADMIN — CEFTRIAXONE SODIUM 1 G: 1 INJECTION, SOLUTION INTRAVENOUS at 21:45

## 2025-01-27 RX ADMIN — ONDANSETRON 4 MG: 2 INJECTION INTRAMUSCULAR; INTRAVENOUS at 10:07

## 2025-01-27 RX ADMIN — DEXTROSE MONOHYDRATE, SODIUM CHLORIDE, AND POTASSIUM CHLORIDE 100 ML/HR: 50; 4.5; 1.49 INJECTION, SOLUTION INTRAVENOUS at 10:11

## 2025-01-27 RX ADMIN — ONDANSETRON 4 MG: 2 INJECTION INTRAMUSCULAR; INTRAVENOUS at 04:32

## 2025-01-27 RX ADMIN — MORPHINE SULFATE 2 MG: 2 INJECTION, SOLUTION INTRAMUSCULAR; INTRAVENOUS at 00:06

## 2025-01-27 RX ADMIN — MORPHINE SULFATE 2 MG: 1 INJECTION, SOLUTION EPIDURAL; INTRATHECAL; INTRAVENOUS at 15:13

## 2025-01-27 RX ADMIN — PROPOFOL 100 MG: 10 INJECTION, EMULSION INTRAVENOUS at 14:51

## 2025-01-27 RX ADMIN — DOCUSATE SODIUM 100 MG: 100 CAPSULE, LIQUID FILLED ORAL at 21:45

## 2025-01-27 RX ADMIN — SODIUM CHLORIDE, POTASSIUM CHLORIDE, SODIUM LACTATE AND CALCIUM CHLORIDE: 600; 310; 30; 20 INJECTION, SOLUTION INTRAVENOUS at 14:39

## 2025-01-27 SDOH — SOCIAL STABILITY: SOCIAL NETWORK: HOW OFTEN DO YOU GET TOGETHER WITH FRIENDS OR RELATIVES?: MORE THAN THREE TIMES A WEEK

## 2025-01-27 SDOH — SOCIAL STABILITY: SOCIAL NETWORK
IN A TYPICAL WEEK, HOW MANY TIMES DO YOU TALK ON THE PHONE WITH FAMILY, FRIENDS, OR NEIGHBORS?: MORE THAN THREE TIMES A WEEK

## 2025-01-27 SDOH — SOCIAL STABILITY: SOCIAL NETWORK
DO YOU BELONG TO ANY CLUBS OR ORGANIZATIONS SUCH AS CHURCH GROUPS, UNIONS, FRATERNAL OR ATHLETIC GROUPS, OR SCHOOL GROUPS?: PATIENT DECLINED

## 2025-01-27 SDOH — HEALTH STABILITY: PHYSICAL HEALTH: ON AVERAGE, HOW MANY MINUTES DO YOU ENGAGE IN EXERCISE AT THIS LEVEL?: 0 MIN

## 2025-01-27 SDOH — SOCIAL STABILITY: SOCIAL INSECURITY: ARE YOU MARRIED, WIDOWED, DIVORCED, SEPARATED, NEVER MARRIED, OR LIVING WITH A PARTNER?: DIVORCED

## 2025-01-27 SDOH — HEALTH STABILITY: PHYSICAL HEALTH
HOW OFTEN DO YOU NEED TO HAVE SOMEONE HELP YOU WHEN YOU READ INSTRUCTIONS, PAMPHLETS, OR OTHER WRITTEN MATERIAL FROM YOUR DOCTOR OR PHARMACY?: NEVER

## 2025-01-27 SDOH — HEALTH STABILITY: PHYSICAL HEALTH: ON AVERAGE, HOW MANY DAYS PER WEEK DO YOU ENGAGE IN MODERATE TO STRENUOUS EXERCISE (LIKE A BRISK WALK)?: 0 DAYS

## 2025-01-27 SDOH — HEALTH STABILITY: MENTAL HEALTH
DO YOU FEEL STRESS - TENSE, RESTLESS, NERVOUS, OR ANXIOUS, OR UNABLE TO SLEEP AT NIGHT BECAUSE YOUR MIND IS TROUBLED ALL THE TIME - THESE DAYS?: NOT AT ALL

## 2025-01-27 SDOH — SOCIAL STABILITY: SOCIAL NETWORK: HOW OFTEN DO YOU ATTEND CHURCH OR RELIGIOUS SERVICES?: PATIENT DECLINED

## 2025-01-27 SDOH — SOCIAL STABILITY: SOCIAL NETWORK: HOW OFTEN DO YOU ATTEND MEETINGS OF THE CLUBS OR ORGANIZATIONS YOU BELONG TO?: PATIENT DECLINED

## 2025-01-27 ASSESSMENT — PAIN SCALES - GENERAL
PAINLEVEL_OUTOF10: 0 - NO PAIN
PAINLEVEL_OUTOF10: 8
PAINLEVEL_OUTOF10: 0 - NO PAIN
PAINLEVEL_OUTOF10: 0 - NO PAIN
PAINLEVEL_OUTOF10: 8
PAINLEVEL_OUTOF10: 0 - NO PAIN
PAINLEVEL_OUTOF10: 2
PAINLEVEL_OUTOF10: 8
PAINLEVEL_OUTOF10: 8
PAINLEVEL_OUTOF10: 3
PAINLEVEL_OUTOF10: 4

## 2025-01-27 ASSESSMENT — ENCOUNTER SYMPTOMS
CARDIOVASCULAR NEGATIVE: 1
NEUROLOGICAL NEGATIVE: 1
PSYCHIATRIC NEGATIVE: 1
ENDOCRINE NEGATIVE: 1
FLANK PAIN: 1
EYES NEGATIVE: 1
RESPIRATORY NEGATIVE: 1
FEVER: 1
CHILLS: 1
DYSURIA: 1
NAUSEA: 1

## 2025-01-27 ASSESSMENT — PAIN - FUNCTIONAL ASSESSMENT
PAIN_FUNCTIONAL_ASSESSMENT: 0-10
PAIN_FUNCTIONAL_ASSESSMENT: UNABLE TO SELF-REPORT
PAIN_FUNCTIONAL_ASSESSMENT: 0-10

## 2025-01-27 ASSESSMENT — COGNITIVE AND FUNCTIONAL STATUS - GENERAL
MOBILITY SCORE: 24
DAILY ACTIVITIY SCORE: 24
MOBILITY SCORE: 24
DAILY ACTIVITIY SCORE: 24

## 2025-01-27 ASSESSMENT — PAIN DESCRIPTION - ORIENTATION
ORIENTATION: LEFT
ORIENTATION: LEFT

## 2025-01-27 ASSESSMENT — PAIN DESCRIPTION - LOCATION
LOCATION: ABDOMEN
LOCATION: ABDOMEN

## 2025-01-27 NOTE — ANESTHESIA POSTPROCEDURE EVALUATION
"Patient: Breanna Jackson \"eNtta\"    Procedure Summary       Date: 01/27/25 Room / Location: [facility] OR 03 / Virtual [facility] OR    Anesthesia Start: 1439 Anesthesia Stop: 1515    Procedure: Cystoscopy with Ureteral Stent Insertion (Left) Diagnosis:       Obstructed, uropathy      (Obstructed, uropathy [N13.9])    Surgeons: Tania De Guzman MD Responsible Provider: Bethel Trejo DO    Anesthesia Type: MAC ASA Status: 2            Anesthesia Type: No value filed.    Vitals Value Taken Time   /82 01/27/25 1625   Temp 36.2 °C (97.2 °F) 01/27/25 1600   Pulse 94 01/27/25 1628   Resp 22 01/27/25 1628   SpO2 98 % 01/27/25 1629   Vitals shown include unfiled device data.    Anesthesia Post Evaluation    Patient location during evaluation: bedside  Patient participation: complete - patient participated  Level of consciousness: awake  Pain management: adequate  Airway patency: patent  Cardiovascular status: acceptable  Respiratory status: acceptable  Hydration status: acceptable  Postoperative Nausea and Vomiting: none        There were no known notable events for this encounter.    "

## 2025-01-27 NOTE — SIGNIFICANT EVENT
01/27/25 1649   Patient Evaluation Program   Pulmonary Status 0     Patient denies hx of OSMEL. Patient currently on RA and placed on CPOX.

## 2025-01-27 NOTE — PROGRESS NOTES
"Occupational Therapy                 Therapy Communication Note    Patient Name: Breanna Jackson \"Netta\"  MRN: [id number]  Department: [address]  Room: [address] [place] [address]A  Today's Date: 1/27/2025     Discipline: Occupational Therapy    Missed Visit Reason: Missed Visit Reason: Other (Comment) (OT screen)    Comment: Patient is a 34 year old female admitted with obstruct, uropathy. Patient reports that she has been up ad ron and reports no acute OT needs. No concerns with returning to [facility]. OT orders to be discontinued at this time.   "

## 2025-01-27 NOTE — CARE PLAN
The patient's goals for the shift include      The clinical goals for the shift include manage pain and nausea, comfort,monitor vs and labs

## 2025-01-27 NOTE — CONSULTS
"Inpatient consult to Infectious Diseases  Consult performed by: Yodit Mcdonald, APRN-CNP  Consult ordered by: Tania De Guzman MD  Reason for consult: antibiotic management          Primary MD: Slime Morris MD        History Of Present Illness  Breanna Jackson \"Sheldon" is a 34 y.o. female presented with left-sided flank pain and nausea.  She reports history of renal calculi.  She Is approximately 9 weeks pregnant.  Renal ultrasound revealed mild hydronephrosis.  CT of abdomen pelvis showed obstructing proximal left ureteral stone, mild left hydronephrosis and perinephritic edema.  She was evaluated by urology status post cystoscopy with left ureteral stent placement.  She reports left-sided flank pain is improving.  Reports fever and chills are intermittent.  She denies shortness of breath cough or chest pain.  She is on room air saturating 97 to 100%.  Her  and children are at the bedside.  She reports intermittent nausea.  She reports dysuria, burning.  Labs with leukocytosis.  Urinalysis with pyuria.  Urine culture with contamination.  Blood cultures pending.  History of urine culture with E. coli  She is on IV of Ceftriaxone.     Past Medical History  She has a past medical history of Encounter for full-term uncomplicated delivery, Encounter for supervision of normal pregnancy, unspecified, first trimester, Encounter for supervision of normal pregnancy, unspecified, first trimester, Other specified health status, Other specified health status (10/10/2019), Personal history of urinary calculi, and Unspecified infection of urinary tract in pregnancy, second trimester ([facility]) (09/26/2019).    Surgical History  She has a past surgical history that includes Other surgical history (07/23/2020) and Other surgical history (06/10/2019).     Social History     Occupational History    Not on file   Tobacco Use    Smoking status: Never    Smokeless tobacco: Never   Substance and Sexual Activity    Alcohol use: Never "    Drug use: Never    Sexual activity: Not on file     Travel History   Travel since 12/27/24    No documented travel since 12/27/24              Family History  Family History   Problem Relation Name Age of Onset    Other (Cardiac Disorder) Mother      Hypothyroidism Mother      Breast cancer Mother      Uterine cancer Mother      Heart murmur Son      Other (LSVC (Persistent left superior vena cava)) Son      Other (LSVC (persistent left suprior vena cava)) Other Child      Allergies  Amoxicillin, Lactose, Penicillins, Prednisone, Raspberry, Wheat, and Latex       There is no immunization history on file for this patient.  Medications  Home medications:  Medications Prior to Admission   Medication Sig Dispense Refill Last Dose/Taking    albuterol 90 mcg/actuation inhaler Inhale 2 puffs 4 times a day. 36 g 1     ergocalciferol (Vitamin D-2) 1.25 MG (36321 UT) capsule Take 1 capsule (1,250 mcg) by mouth 1 (one) time per week. 4 capsule 11     nitrofurantoin, macrocrystal-monohydrate, (Macrobid) 100 mg capsule Take 1 capsule (100 mg) by mouth 2 times a day for 5 days. 10 capsule 0     prenatal no115/iron/folic acid (PRENATAL 19 ORAL) Take by mouth.       tamsulosin (Flomax) 0.4 mg 24 hr capsule Take 1 capsule (0.4 mg) by mouth once daily. 7 capsule 0      Current medications:  Scheduled medications  albuterol, 2.5 mg, nebulization, Once  cefTRIAXone, 1 g, intravenous, q24h  docusate sodium, 100 mg, oral, BID  lidocaine, 0.1 mL, subcutaneous, Once  lidocaine, 1 patch, transdermal, Daily  polyethylene glycol, 17 g, oral, Daily      Continuous medications  potassium [id number]%NaCl, 100 mL/hr, Last Rate: 100 mL/hr (01/27/25 1720)  lactated Ringer's, 100 mL/hr      PRN medications  PRN medications: acetaminophen **OR** acetaminophen **OR** acetaminophen, benzocaine-menthol, dextromethorphan-guaifenesin, fentaNYL PF, fentaNYL PF, guaiFENesin, HYDROmorphone, HYDROmorphone, meperidine, midazolam, morphine, ondansetron  ODT **OR** ondansetron, ondansetron, promethazine, promethazine    Review of Systems   Constitutional:  Positive for chills and fever.   HENT: Negative.     Eyes: Negative.    Respiratory: Negative.     Cardiovascular: Negative.    Gastrointestinal:  Positive for nausea.   Endocrine: Negative.    Genitourinary:  Positive for dysuria and flank pain.   Skin: Negative.    Neurological: Negative.    Psychiatric/Behavioral: Negative.          Objective  Range of Vitals (last 24 hours)  Heart Rate:  []   Temp:  [36.2 °C (97.2 °F)-38.9 °C (102 °F)]   Resp:  [17-26]   BP: ()/(64-88)   SpO2:  [95 %-100 %]   Daily Weight  01/26/25 : 90.7 kg (200 lb)    Body mass index is 36.57 kg/m².     Physical Exam  Constitutional:       Appearance: Normal appearance.   HENT:      Head: Normocephalic and atraumatic.      Nose: Nose normal.      Mouth/Throat:      Mouth: Mucous membranes are moist.      Pharynx: Oropharynx is clear.   Eyes:      General: No scleral icterus.  Cardiovascular:      Rate and Rhythm: Normal rate and regular rhythm.   Pulmonary:      Effort: Pulmonary effort is normal.      Breath sounds: Normal breath sounds.   Abdominal:      General: Bowel sounds are normal.      Palpations: Abdomen is soft.   Musculoskeletal:         General: Normal range of motion.      Cervical back: Normal range of motion and neck supple.   Skin:     General: Skin is warm and dry.   Neurological:      General: No focal deficit present.      Mental Status: She is alert and oriented to person, place, and time. Mental status is at baseline.   Psychiatric:         Mood and Affect: Mood normal.         Behavior: Behavior normal.          Relevant Results  Outside Hospital Results  No  Labs  Results from last 72 hours   Lab Units 01/26/25  0731 01/25/25  2048 01/25/25  1008   WBC AUTO x10*3/uL 19.1* 16.0* 10.7   HEMOGLOBIN g/dL 12.2 13.6 14.2   HEMATOCRIT % 35.7* 39.1 41.2   PLATELETS AUTO x10*3/uL 241 248 278   NEUTROS PCT AUTO %   "--  88.7 76.0   LYMPHS PCT AUTO %  --  4.9 16.9   MONOS PCT AUTO %  --  5.8 6.3   EOS PCT AUTO %  --  0.0 0.1     Results from last 72 hours   Lab Units 01/26/25 0731 01/25/25 2048 01/25/25  1008   SODIUM mmol/L 131* 134* 134*   POTASSIUM mmol/L 3.5 3.6 3.7   CHLORIDE mmol/L 102 102 100   CO2 mmol/L 19* 20* 24   BUN mg/dL 6 9 10   CREATININE mg/dL 0.87 0.82 0.85   GLUCOSE mg/dL 130* 124* 96   CALCIUM mg/dL 8.4* 9.4 9.5   ANION GAP mmol/L 14 16 14   EGFR mL/min/1.73m*2 90 >90 >90     Results from last 72 hours   Lab Units 01/26/25 0731 01/25/25 2048 01/25/25  1008   ALK PHOS U/L 49 55 53   BILIRUBIN TOTAL mg/dL 0.5 0.6 0.5   PROTEIN TOTAL g/dL 6.5 7.5 7.3   ALT U/L 16 20 20   AST U/L 13 15 16   ALBUMIN g/dL 3.8 4.4 4.3     Estimated Creatinine Clearance: 95.4 mL/min (by C-G formula based on SCr of 0.87 mg/dL).  No results found for: \"CRP\", \"SEDRATE\"  HIV 1/2 Antigen/Antibody Screen with Reflex to Confirmation   Date Value Ref Range Status   01/09/2025 Nonreactive Nonreactive Final     Hepatitis C AB   Date Value Ref Range Status   01/09/2025 Nonreactive Nonreactive Final     Comment:     Results from patients taking biotin supplements or receiving high-dose biotin therapy should be interpreted with caution due to possible interference with this test. Providers may contact their local laboratory for further information.     Microbiology  Blood cultures pending  Urine culture-contamination        Imaging  US guided percutaneous placement    Result Date: 1/27/2025  These images are not reportable by radiology and will not be interpreted by  Radiologists.    CT abdomen pelvis wo IV contrast    Result Date: 1/26/2025  Interpreted By:  Moisés Wen, STUDY: CT ABDOMEN PELVIS WO IV CONTRAST;  1/26/2025 11:40 am   INDICATION: Signs/Symptoms:hydronephrosis.     COMPARISON: Pelvic ultrasound 25 January 2025; CT abdomen and pelvis without contrast 13 June 2018   ACCESSION NUMBER(S): [id number]   ORDERING CLINICIAN: SALLY" DAVILI   TECHNIQUE: CT of the abdomen and pelvis from the lung bases through the symphysis pubis without oral or IV contrast   FINDINGS: LOWER CHEST: No acute airspace disease.   BONES: No acute skeletal findings.   RIGHT KIDNEY AND URETER: Radiodense stone: Positive, but no obstructing/offending stone. Only six or seven small nonobstructing renal stones. None in the ureter Hydronephrosis: Negative Congenital variant anatomy: Negative. No duplicated ureter or other variant anatomy. Other: n/a   LEFT KIDNEY AND URETER: Radiodense stone: Positive, including an obstructing/offending stone. In the proximal ureter not far distal from the UPJ, about 3.5 cm above the level of the pelvic inlet and approximately 16-18 cm proximal to the UVJ, a 3-4 mm long but too thin to measure attenuation obstructing stone. No others in the ureter. In the kidney, six or seven less than 3 mm (too small to accurately measure attenuation) nonobstructing stones Hydronephrosis: Negative Congenital variant anatomy: Negative. No duplicated ureter or other variant anatomy. Other: n/a   URINARY BLADDER: Radiodense stone: Negative Wall thickening / other inflammatory change: Negative Diverticula: Negative Congenital variant anatomy: Negative. No variant anatomy such as urachal remnant. Other: n/a   LIVER: Diffusely mildly fatty with one superimposed region of focal increased fatty deposition in a characteristic location near the gallbladder fossa. No obvious focal abnormality on this exam without contrast   SPLEEN: Normal. No enlargement.   PANCREAS: Normal. No CT evidence of acute or chronic pancreatitis. No obvious  duct dilation. No gross evidence of a mass, noting low sensitivity and specificity without IV contrast.   GALLBLADDER: Normal CT appearance. No dilation, calcified, or gas-containing stones.  Other types of gallstones could be occult on CT and detectable only by ultrasound.   BILE DUCTS: Normal. No biliary duct dilation.   ADRENAL  GLANDS: Normal. No nodule or mass.   LYMPH NODES: No adenopathy, intraperitoneal, retroperitoneal, pelvic, inguinal or otherwise.   APPENDIX: Normal.  Not dilated, thick walled or in any other way inflamed in appearance.  No inflammatory change about the appendix.   COLON: Normal. No sign of acute diverticulitis or other colitis. No annular constricting mass.   SMALL BOWEL: Normal. No small bowel dilation or any other sign of small bowel obstruction.   STOMACH / DUODENUM: Grossly normal by CT which has limited sensitivity and specificity for the stomach and duodenum.   RETROPERITONEUM: Normal.  No acute hemorrhage or inflammatory change. Lymph nodes in a separate dedicated section.   OMENTUM, MESENTERY AND PERITONEAL SPACES: Free intraperitoneal air: Negative Free intraperitoneal fluid: Negative Abscess: Negative Other: n/a   PELVIS: Early gravid uterus   VASCULATURE: Unenhanced CT appearance within normal limits. No abdominal aortic aneurysm or other acute finding.   ABDOMINAL WALL: Hernia: Negative Other: No acute or contributory abnormality.       3-4 mm long but too thin to accurately measure attenuation obstructing proximal left ureteral stone causing mild left hydronephrosis and perinephric edema   No other acute findings   MACRO: None   Signed by: Moisés Wen 1/26/2025 11:58 AM Dictation workstation:   [id number]    US PELVIS OB TRANSABDOMINAL W TRANSVAGINAL UP TO 1ST TRIMESTER    Result Date: 1/25/2025  Interpreted By:  Franci Rios, STUDY: US PELVIS OB TRANSABDOMINAL W TRANSVAGINAL UP TO 1ST TRIMESTER 1/25/2025 12:42 pm   INDICATION: 33 y/o   F with  Signs/Symptoms:left abd pain.     COMPARISON: None.   ACCESSION NUMBER(S): [id number]   ORDERING CLINICIAN: ZAC NOYOLA   TECHNIQUE: Transabdominal and transvaginal pelvic ultrasound was performed.   FINDINGS: UTERUS AND GESTATIONAL SAC:   Intrauterine gestational sac containing a yolk sac and single fetal pole with average crown-rump length of 16 mm which  correlates with gestational age of 8 weeks 0 days +/-5 days. Fetal cardiac activity seen with heart rate of 160 beats per minute. No fluid collection around the gestational sac or focal uterine mass lesion demonstrated.   ADNEXA/OVARIES:   Right ovary  measures 2.6 x 1.7 x 2.2 cm. Intact flow on limited Doppler evaluation.   Left ovary  is not visualized.   Trace free fluid in the cul-de-sac, likely physiologic.       Single live intrauterine pregnancy with estimated gestational age of 8 weeks 0 days +/-5 days. Ultrasound [facility] is 09/06/2025. Based on LMP of 11/23/2024 the current gestational age would be 9 weeks 0 days.   Nonvisualization of the left ovary.   MACRO: None.   Signed by: Franci Rios 1/25/2025 12:51 PM Dictation workstation:   [id number]    US renal complete    Result Date: 1/25/2025  Interpreted By:  Ray Roger, STUDY: US RENAL COMPLETE;  1/25/2025 12:14 pm   INDICATION: Signs/Symptoms:left flank pain.     COMPARISON: None.   ACCESSION NUMBER(S): [id number]   ORDERING CLINICIAN: ZAC NOYOLA   TECHNIQUE: Multiple images of the kidneys were obtained  .   FINDINGS: RIGHT KIDNEY: The right kidney measures 10.9 cm in length. The renal cortical echogenicity and thickness are within normal limits. No hydronephrosis. A few echogenic nephroliths may be present measuring up to 8 mm. The adjacent liver is echogenic suggestive of steatosis.   LEFT KIDNEY: The left kidney measures 12.1 cm in length. The renal cortical echogenicity and thickness are within normal limits. Mild hydronephrosis. 5 mm nephrolith is suspected..   BLADDER: Partially distended. Bilateral ureteral jets were observed.         Mild left hydronephrosis. Suspect bilateral nephroliths. Suspect hepatic steatosis.   MACRO: None   Signed by: Ray Roger 1/25/2025 12:23 PM Dictation workstation:   [id number]    US pelvis limited    Transvaginal ultrasound shows fluid yolk sac and evidence of early cardiac activity.  Repeat ultrasound 3 to 4  weeks     Assessment/Plan   Sepsis, Fever,Leukocytosis  Pyuria versus urinary tract infection  Obstructing Uropathy, mild left hydronephrosis and perinephritic edema-status post left ureteral stent 1/27/2025  9 weeks gestation  Leukocytosis  History of urine culture with E. Coli      IV ceftriaxone  Repeat urine culture  Follow blood culture  Monitor temperature and WBC  Supportive care  Monitor response to therapy  Urology follow up     Total time spent caring for the patient today was 30 minutes. This includes time spent before the visit reviewing the chart, time spent during the visit, and time spent after the visit on documentation     Yodit Mcdonald, APRN-CNP

## 2025-01-27 NOTE — OP NOTE
"Cystoscopy with Ureteral Stent Insertion (L) Operative Note     Date: 2025 - 2025  OR Location: [facility]    Name: Breanna Jackson \"Sheldon", : 1990, Age: 34 y.o., MRN: [id number], Sex: female    Diagnosis  Pre-op Diagnosis      * Obstructed, uropathy [N13.9] Post-op Diagnosis     * Obstructed, uropathy [N13.9]     Procedures  Cystoscopy with Ureteral Stent Insertion  30581 - MT CYSTO W/INSERT URETERAL STENT      Surgeons      * Tania De Guzman - Primary    Resident/Fellow/Other Assistant:  Surgeons and Role:  * No surgeons found with a matching role *    Staff:   Circulator: Shawn Reddy Person: Barry    Anesthesia Staff: Anesthesiologist: Bethel Trejo DO  CRNA: SOUMYA Nixon-SANDHYA    Procedure Summary  Anesthesia: Anesthesia type not filed in the log.  ASA: ASA status not filed in the log.  Estimated Blood Loss: 0 mL  Intra-op Medications:   Administrations occurring from 1445 to 1535 on 25:   Medication Name Total Dose   lidocaine 2 % mucosal jelly (Uro-Jet) 1 Application   morphine PF injection 1 mg/mL 2 mg   propofol (Diprivan) injection 10 mg/mL 274.19 mg              Anesthesia Record               Intraprocedure I/O Totals       None           Specimen: No specimens collected              Drains and/or Catheters: * None in log *    Tourniquet Times:         Implants:  Implants       Type Name Action Serial No.      Stent STENT, INLAY OPTIMA, 6FR X 24CM - [id number] Implanted               Findings: 1. Normal bladder mucosa.       Indications: Breanna Jackson \"Sheldon" is an 34 y.o. female who is having surgery for Obstructed, uropathy [N13.9].  She presented to the hospital with left flank pain, and subsequently developed fevers.  Abdominal imaging revealed an obstructing stone in the left proximal ureter.  Given these findings she was advised to undergo placement of a stent.    The patient was seen in the preoperative area. The risks, benefits, complications, treatment options, non-operative " alternatives, expected recovery and outcomes were discussed with the patient. The possibilities of reaction to medication, pulmonary aspiration, injury to surrounding structures, bleeding, recurrent infection, the need for additional procedures, failure to diagnose a condition, and creating a complication requiring transfusion or operation were discussed with the patient. The patient concurred with the proposed plan, giving informed consent.  The site of surgery was properly noted/marked if necessary per policy. The patient has been actively warmed in preoperative area. Preoperative antibiotics have been ordered and given within 1 hours of incision. Venous thrombosis prophylaxis have been ordered including bilateral sequential compression devices    Procedure Details: The patient brought to the operating room table.  Conscious sedation was induced.  The patient was placed in the dorsolithotomy position.  Her genitalia were prepped and draped.  We introduced a urethral sheath per urethra and we drained the bladder.  We next introduced a cystoscope.  We carefully inspected the bladder mucosa.  There were no abnormalities noted.  We next introduced intraoperative ultrasound in order to visualize the left kidney.  A guidewire was introduced into the left ureter.  Using ultrasound guidance we visualized the wire being advanced up towards the left kidney.  We next obtained a 6 Mongolian by 24 cm double-J stent.  The stent was advanced over our guidewire into the left renal collecting system.  The wire was removed and we visualized a good proximal and distal curl.  At this point the bladder was drained.  The patient was awakened and brought to the recovery room in stable condition.  Complications:  None; patient tolerated the procedure well.    Disposition: PACU - hemodynamically stable.  Condition: stable                 Additional Details:     Attending Attestation: I was present and scrubbed for the entire  procedure.    Tania De Guzman  Phone Number: [phone]

## 2025-01-27 NOTE — CARE PLAN
The patient's goals for the shift include      The clinical goals for the shift include manage pain and nausea, patient comfort      Problem: Pain - Adult  Goal: Verbalizes/displays adequate comfort level or baseline comfort level  Outcome: Progressing

## 2025-01-27 NOTE — PROGRESS NOTES
01/27/25 1511   Discharge Planning   Living Arrangements Spouse/significant other;Children   Support Systems Spouse/significant other   Assistance Needed independent   Type of Residence Private residence   Home or Post Acute Services None   Expected Discharge Disposition Home   Does the patient need discharge transport arranged? No     Home no needs when medically clear.

## 2025-01-27 NOTE — ANESTHESIA PREPROCEDURE EVALUATION
"Patient: Breanna Jackson \"Netta\"    Evaluation Method:     Procedure Information       Anesthesia Start Date/Time: 01/27/25 1439    Procedure: Cystoscopy with Ureteral Stent Insertion (Left) - ultrasound    Location: [facility] / [facility]    Surgeons: Tania De Guzman MD            Relevant Problems   Cardiac   (+) Hyperlipidemia      Pulmonary   (+) OSMEL (obstructive sleep apnea)      Neuro   (+) Lumbar radiculopathy, acute   (+) Meralgia paresthetica of right side   (+) Meralgia paresthetica, left   (+) Sciatica of left side      /Renal   (+) UTI (urinary tract infection)      Liver   (+) Nonalcoholic fatty liver disease      Endocrine   (+) Class 1 obesity with body mass index (BMI) of 34.0 to 34.9 in adult   (+) Class 2 obesity with body mass index (BMI) of 37.0 to 37.9 in adult   (+) Enlarged thyroid   (+) Obesity      Hematology   (+) Von Willebrand disease (Multi)      HEENT   (+) Hearing loss on right   (+) Mixed conductive and sensorineural hearing loss of right ear with restricted hearing of left ear   (+) Sensorineural hearing loss (SNHL) of left ear with restricted hearing of right ear   (+) Sinus pressure      ID   (+) UTI (urinary tract infection)      GYN   (+) 9 weeks gestation of pregnancy ([facility]HS-HCC)   (+) Menorrhagia with irregular cycle   (+) PCOS (polycystic ovarian syndrome)       Clinical information reviewed:   Tobacco  Allergies  Meds  Problems  Med Hx  Surg Hx   Fam Hx          NPO Detail:  No data recorded     OB/GYN     Physical Exam    Airway  Mallampati: II  TM distance: >3 FB     Cardiovascular    Dental    Pulmonary    Abdominal            Anesthesia Plan    History of general anesthesia?: yes  History of complications of general anesthesia?: no    ASA 2     MAC     intravenous induction   Anesthetic plan and risks discussed with patient.        "

## 2025-01-28 ENCOUNTER — PHARMACY VISIT (OUTPATIENT)
Dept: PHARMACY | Facility: CLINIC | Age: 35
End: 2025-01-28
Payer: MEDICAID

## 2025-01-28 VITALS
DIASTOLIC BLOOD PRESSURE: 73 MMHG | WEIGHT: 200 LBS | SYSTOLIC BLOOD PRESSURE: 128 MMHG | HEART RATE: 86 BPM | TEMPERATURE: 98.2 F | BODY MASS INDEX: 36.8 KG/M2 | RESPIRATION RATE: 18 BRPM | OXYGEN SATURATION: 96 % | HEIGHT: 62 IN

## 2025-01-28 LAB
ANION GAP SERPL CALCULATED.3IONS-SCNC: 11 MMOL/L (ref 10–20)
BUN SERPL-MCNC: 7 MG/DL (ref 6–23)
CALCIUM SERPL-MCNC: 8.2 MG/DL (ref 8.6–10.3)
CHLORIDE SERPL-SCNC: 106 MMOL/L (ref 98–107)
CO2 SERPL-SCNC: 20 MMOL/L (ref 21–32)
CREAT SERPL-MCNC: 0.83 MG/DL (ref 0.5–1.05)
EGFRCR SERPLBLD CKD-EPI 2021: >90 ML/MIN/1.73M*2
ERYTHROCYTE [DISTWIDTH] IN BLOOD BY AUTOMATED COUNT: 12.9 % (ref 11.5–14.5)
GLUCOSE SERPL-MCNC: 125 MG/DL (ref 74–99)
HCT VFR BLD AUTO: 34.4 % (ref 36–46)
HGB BLD-MCNC: 11.3 G/DL (ref 12–16)
MCH RBC QN AUTO: 28.4 PG (ref 26–34)
MCHC RBC AUTO-ENTMCNC: 32.8 G/DL (ref 32–36)
MCV RBC AUTO: 86 FL (ref 80–100)
NRBC BLD-RTO: 0 /100 WBCS (ref 0–0)
PLATELET # BLD AUTO: 181 X10*3/UL (ref 150–450)
POTASSIUM SERPL-SCNC: 3.8 MMOL/L (ref 3.5–5.3)
RBC # BLD AUTO: 3.98 X10*6/UL (ref 4–5.2)
SODIUM SERPL-SCNC: 133 MMOL/L (ref 136–145)
WBC # BLD AUTO: 8.5 X10*3/UL (ref 4.4–11.3)

## 2025-01-28 PROCEDURE — RXMED WILLOW AMBULATORY MEDICATION CHARGE

## 2025-01-28 PROCEDURE — 99231 SBSQ HOSP IP/OBS SF/LOW 25: CPT | Performed by: SURGERY

## 2025-01-28 PROCEDURE — 36415 COLL VENOUS BLD VENIPUNCTURE: CPT | Performed by: INTERNAL MEDICINE

## 2025-01-28 PROCEDURE — 99238 HOSP IP/OBS DSCHRG MGMT 30/<: CPT | Performed by: INTERNAL MEDICINE

## 2025-01-28 PROCEDURE — 2500000001 HC RX 250 WO HCPCS SELF ADMINISTERED DRUGS (ALT 637 FOR MEDICARE OP): Performed by: SURGERY

## 2025-01-28 PROCEDURE — 80048 BASIC METABOLIC PNL TOTAL CA: CPT | Performed by: INTERNAL MEDICINE

## 2025-01-28 PROCEDURE — 2500000004 HC RX 250 GENERAL PHARMACY W/ HCPCS (ALT 636 FOR OP/ED): Performed by: SURGERY

## 2025-01-28 PROCEDURE — 85027 COMPLETE CBC AUTOMATED: CPT | Performed by: INTERNAL MEDICINE

## 2025-01-28 RX ORDER — CEPHALEXIN 500 MG/1
500 CAPSULE ORAL EVERY 6 HOURS
Qty: 52 CAPSULE | Refills: 0 | Status: SHIPPED | OUTPATIENT
Start: 2025-01-28 | End: 2025-02-10

## 2025-01-28 RX ADMIN — DEXTROSE MONOHYDRATE, SODIUM CHLORIDE, AND POTASSIUM CHLORIDE 100 ML/HR: 50; 4.5; 1.49 INJECTION, SOLUTION INTRAVENOUS at 01:37

## 2025-01-28 RX ADMIN — ACETAMINOPHEN 650 MG: 325 TABLET ORAL at 05:31

## 2025-01-28 RX ADMIN — ACETAMINOPHEN 650 MG: 325 TABLET ORAL at 01:09

## 2025-01-28 RX ADMIN — ONDANSETRON 4 MG: 2 INJECTION INTRAMUSCULAR; INTRAVENOUS at 05:31

## 2025-01-28 ASSESSMENT — PAIN SCALES - GENERAL
PAINLEVEL_OUTOF10: 7
PAINLEVEL_OUTOF10: 4
PAINLEVEL_OUTOF10: 5 - MODERATE PAIN
PAINLEVEL_OUTOF10: 4

## 2025-01-28 ASSESSMENT — PAIN - FUNCTIONAL ASSESSMENT
PAIN_FUNCTIONAL_ASSESSMENT: 0-10

## 2025-01-28 ASSESSMENT — PAIN SCALES - PAIN ASSESSMENT IN ADVANCED DEMENTIA (PAINAD): TOTALSCORE: MEDICATION (SEE MAR)

## 2025-01-28 ASSESSMENT — PAIN DESCRIPTION - LOCATION
LOCATION: ABDOMEN
LOCATION: ABDOMEN

## 2025-01-28 ASSESSMENT — PAIN DESCRIPTION - ORIENTATION: ORIENTATION: LEFT

## 2025-01-28 NOTE — NURSING NOTE
Reviewed discharge instructions with patient and family, antibiotic delivered to pt's room by pharmacy, pt discharged home with all belongings

## 2025-01-28 NOTE — PROGRESS NOTES
"Breanna Jackson \"Netta\" is a 34 y.o. female on day 3 of admission presenting with Obstructed, uropathy.    Subjective   Feels much better today.  Tolerating diet.  Pain improved.          Objective     Physical Exam  Awake, alert  Breathing comfortably  Abdomen soft, ND, NT      Last Recorded Vitals  Blood pressure 128/73, pulse 86, temperature 36.8 °C (98.2 °F), temperature source Temporal, resp. rate 18, height 1.575 m (5' 2.01\"), weight 90.7 kg (200 lb), last menstrual period 11/23/2024, SpO2 96%.  Intake/Output last 3 Shifts:  I/O last 3 completed shifts:  In: 1120 (12.3 mL/kg) [P.O.:720; IV Piggyback:400]  Out: 900 (9.9 mL/kg) [Urine:900 (0.3 mL/kg/hr)]  Weight: 90.7 kg     Relevant Results  Scheduled medications  albuterol, 2.5 mg, nebulization, Once  cefTRIAXone, 1 g, intravenous, q24h  docusate sodium, 100 mg, oral, BID  lidocaine, 0.1 mL, subcutaneous, Once  lidocaine, 1 patch, transdermal, Daily  polyethylene glycol, 17 g, oral, Daily      Continuous medications  potassium vivkwqs-I6-5.45%NaCl, 100 mL/hr, Last Rate: 100 mL/hr (01/28/25 0137)  lactated Ringer's, 100 mL/hr      PRN medications  PRN medications: acetaminophen **OR** acetaminophen **OR** acetaminophen, benzocaine-menthol, dextromethorphan-guaifenesin, fentaNYL PF, guaiFENesin, HYDROmorphone, meperidine, midazolam, morphine, ondansetron ODT **OR** ondansetron, ondansetron    Results for orders placed or performed during the hospital encounter of 01/25/25 (from the past 24 hours)   CBC   Result Value Ref Range    WBC 8.5 4.4 - 11.3 x10*3/uL    nRBC 0.0 0.0 - 0.0 /100 WBCs    RBC 3.98 (L) 4.00 - 5.20 x10*6/uL    Hemoglobin 11.3 (L) 12.0 - 16.0 g/dL    Hematocrit 34.4 (L) 36.0 - 46.0 %    MCV 86 80 - 100 fL    MCH 28.4 26.0 - 34.0 pg    MCHC 32.8 32.0 - 36.0 g/dL    RDW 12.9 11.5 - 14.5 %    Platelets 181 150 - 450 x10*3/uL   Basic Metabolic Panel   Result Value Ref Range    Glucose 125 (H) 74 - 99 mg/dL    Sodium 133 (L) 136 - 145 mmol/L    " Potassium 3.8 3.5 - 5.3 mmol/L    Chloride 106 98 - 107 mmol/L    Bicarbonate 20 (L) 21 - 32 mmol/L    Anion Gap 11 10 - 20 mmol/L    Urea Nitrogen 7 6 - 23 mg/dL    Creatinine 0.83 0.50 - 1.05 mg/dL    eGFR >90 >60 mL/min/1.73m*2    Calcium 8.2 (L) 8.6 - 10.3 mg/dL       US guided percutaneous placement    Result Date: 1/27/2025  These images are not reportable by radiology and will not be interpreted by  Radiologists.    CT abdomen pelvis wo IV contrast    Result Date: 1/26/2025  Interpreted By:  Moisés Wen, STUDY: CT ABDOMEN PELVIS WO IV CONTRAST;  1/26/2025 11:40 am   INDICATION: Signs/Symptoms:hydronephrosis.     COMPARISON: Pelvic ultrasound 25 January 2025; CT abdomen and pelvis without contrast 13 June 2018   ACCESSION NUMBER(S): [id number]   ORDERING CLINICIAN: SALLY SUH   TECHNIQUE: CT of the abdomen and pelvis from the lung bases through the symphysis pubis without oral or IV contrast   FINDINGS: LOWER CHEST: No acute airspace disease.   BONES: No acute skeletal findings.   RIGHT KIDNEY AND URETER: Radiodense stone: Positive, but no obstructing/offending stone. Only six or seven small nonobstructing renal stones. None in the ureter Hydronephrosis: Negative Congenital variant anatomy: Negative. No duplicated ureter or other variant anatomy. Other: n/a   LEFT KIDNEY AND URETER: Radiodense stone: Positive, including an obstructing/offending stone. In the proximal ureter not far distal from the UPJ, about 3.5 cm above the level of the pelvic inlet and approximately 16-18 cm proximal to the UVJ, a 3-4 mm long but too thin to measure attenuation obstructing stone. No others in the ureter. In the kidney, six or seven less than 3 mm (too small to accurately measure attenuation) nonobstructing stones Hydronephrosis: Negative Congenital variant anatomy: Negative. No duplicated ureter or other variant anatomy. Other: n/a   URINARY BLADDER: Radiodense stone: Negative Wall thickening / other inflammatory  change: Negative Diverticula: Negative Congenital variant anatomy: Negative. No variant anatomy such as urachal remnant. Other: n/a   LIVER: Diffusely mildly fatty with one superimposed region of focal increased fatty deposition in a characteristic location near the gallbladder fossa. No obvious focal abnormality on this exam without contrast   SPLEEN: Normal. No enlargement.   PANCREAS: Normal. No CT evidence of acute or chronic pancreatitis. No obvious  duct dilation. No gross evidence of a mass, noting low sensitivity and specificity without IV contrast.   GALLBLADDER: Normal CT appearance. No dilation, calcified, or gas-containing stones.  Other types of gallstones could be occult on CT and detectable only by ultrasound.   BILE DUCTS: Normal. No biliary duct dilation.   ADRENAL GLANDS: Normal. No nodule or mass.   LYMPH NODES: No adenopathy, intraperitoneal, retroperitoneal, pelvic, inguinal or otherwise.   APPENDIX: Normal.  Not dilated, thick walled or in any other way inflamed in appearance.  No inflammatory change about the appendix.   COLON: Normal. No sign of acute diverticulitis or other colitis. No annular constricting mass.   SMALL BOWEL: Normal. No small bowel dilation or any other sign of small bowel obstruction.   STOMACH / DUODENUM: Grossly normal by CT which has limited sensitivity and specificity for the stomach and duodenum.   RETROPERITONEUM: Normal.  No acute hemorrhage or inflammatory change. Lymph nodes in a separate dedicated section.   OMENTUM, MESENTERY AND PERITONEAL SPACES: Free intraperitoneal air: Negative Free intraperitoneal fluid: Negative Abscess: Negative Other: n/a   PELVIS: Early gravid uterus   VASCULATURE: Unenhanced CT appearance within normal limits. No abdominal aortic aneurysm or other acute finding.   ABDOMINAL WALL: Hernia: Negative Other: No acute or contributory abnormality.       3-4 mm long but too thin to accurately measure attenuation obstructing proximal left  ureteral stone causing mild left hydronephrosis and perinephric edema   No other acute findings   MACRO: None   Signed by: Moisés Wen 1/26/2025 11:58 AM Dictation workstation:   [id number]    US PELVIS OB TRANSABDOMINAL W TRANSVAGINAL UP TO 1ST TRIMESTER    Result Date: 1/25/2025  Interpreted By:  Franci Rios, STUDY: US PELVIS OB TRANSABDOMINAL W TRANSVAGINAL UP TO 1ST TRIMESTER 1/25/2025 12:42 pm   INDICATION: 33 y/o   F with  Signs/Symptoms:left abd pain.     COMPARISON: None.   ACCESSION NUMBER(S): [id number]   ORDERING CLINICIAN: ZAC NOYOLA   TECHNIQUE: Transabdominal and transvaginal pelvic ultrasound was performed.   FINDINGS: UTERUS AND GESTATIONAL SAC:   Intrauterine gestational sac containing a yolk sac and single fetal pole with average crown-rump length of 16 mm which correlates with gestational age of 8 weeks 0 days +/-5 days. Fetal cardiac activity seen with heart rate of 160 beats per minute. No fluid collection around the gestational sac or focal uterine mass lesion demonstrated.   ADNEXA/OVARIES:   Right ovary  measures 2.6 x 1.7 x 2.2 cm. Intact flow on limited Doppler evaluation.   Left ovary  is not visualized.   Trace free fluid in the cul-de-sac, likely physiologic.       Single live intrauterine pregnancy with estimated gestational age of 8 weeks 0 days +/-5 days. Ultrasound [facility] is 09/06/2025. Based on LMP of 11/23/2024 the current gestational age would be 9 weeks 0 days.   Nonvisualization of the left ovary.   MACRO: None.   Signed by: Franci Rios 1/25/2025 12:51 PM Dictation workstation:   [id number]    US renal complete    Result Date: 1/25/2025  Interpreted By:  Ray Roger, STUDY: US RENAL COMPLETE;  1/25/2025 12:14 pm   INDICATION: Signs/Symptoms:left flank pain.     COMPARISON: None.   ACCESSION NUMBER(S): [id number]   ORDERING CLINICIAN: ZAC NOYOLA   TECHNIQUE: Multiple images of the kidneys were obtained  .   FINDINGS: RIGHT KIDNEY: The right kidney measures 10.9 cm in  length. The renal cortical echogenicity and thickness are within normal limits. No hydronephrosis. A few echogenic nephroliths may be present measuring up to 8 mm. The adjacent liver is echogenic suggestive of steatosis.   LEFT KIDNEY: The left kidney measures 12.1 cm in length. The renal cortical echogenicity and thickness are within normal limits. Mild hydronephrosis. 5 mm nephrolith is suspected..   BLADDER: Partially distended. Bilateral ureteral jets were observed.         Mild left hydronephrosis. Suspect bilateral nephroliths. Suspect hepatic steatosis.   MACRO: None   Signed by: Ray Roger 1/25/2025 12:23 PM Dictation workstation:   [id number]    US pelvis limited    Transvaginal ultrasound shows fluid yolk sac and evidence of early cardiac activity.  Repeat ultrasound 3 to 4 weeks                             Assessment/Plan   Assessment & Plan  Obstructed, uropathy    UTI (urinary tract infection)    9 weeks gestation of pregnancy ([facility])    S/P left ureteral stent placement.      Antibiotics per primary team.  No objections to discharge.  Follow up in my office in 6 weeks.            I spent 15 minutes in the professional and overall care of this patient.      Tania De Guzman MD

## 2025-01-28 NOTE — DISCHARGE SUMMARY
"Discharge Diagnosis  Obstructed, uropathy    Issues Requiring Follow-Up  UTI  Pregnant    Test Results Pending At Discharge  Pending Labs       Order Current Status    Urine Culture In process            Hospital Course   Breanna Jackson \"Sheldon" is a 34 y.o. female presenting with flank pain and nausea.  Patient is 9 weeks pregnant.  In the emergency room diagnosed with obstructive uropathy.  She came to the emergency room discharged home and then came back as pain got worse and she was vomiting.   Treated with IV antibiotic IV fluids.  Had a stent placement for left ureteral stone.  Patient felt much better after the procedure and fevers were gone.  Discharged on oral Keflex  Pertinent Physical Exam At Time of Discharge  Physical Exam  Vitals reviewed.   Constitutional:       Appearance: Normal appearance.   HENT:      Head: Normocephalic and atraumatic.      Right Ear: Tympanic membrane, ear canal and external ear normal.      Left Ear: Tympanic membrane, ear canal and external ear normal.      Nose: Nose normal.      Mouth/Throat:      Pharynx: Oropharynx is clear.   Eyes:      Extraocular Movements: Extraocular movements intact.      Conjunctiva/sclera: Conjunctivae normal.      Pupils: Pupils are equal, round, and reactive to light.   Cardiovascular:      Rate and Rhythm: Normal rate and regular rhythm.      Pulses: Normal pulses.      Heart sounds: Normal heart sounds.   Pulmonary:      Effort: Pulmonary effort is normal.      Breath sounds: Normal breath sounds.   Abdominal:      General: Abdomen is flat. Bowel sounds are normal.      Palpations: Abdomen is soft.   Musculoskeletal:      Cervical back: Normal range of motion and neck supple.   Skin:     General: Skin is warm and dry.   Neurological:      General: No focal deficit present.      Mental Status: She is alert and oriented to person, place, and time.   Psychiatric:         Mood and Affect: Mood normal.         Home Medications     Medication List    "   START taking these medications     cephalexin 500 mg capsule; Commonly known as: Keflex; Take 1 capsule   (500 mg) by mouth every 6 hours for 13 days.     CONTINUE taking these medications     albuterol 90 mcg/actuation inhaler; Inhale 2 puffs 4 times a day.   ergocalciferol 1.25 MG (44169 UT) capsule; Commonly known as: Vitamin   D-2; Take 1 capsule (1,250 mcg) by mouth 1 (one) time per week.   PRENATAL 19 ORAL     STOP taking these medications     nitrofurantoin (macrocrystal-monohydrate) 100 mg capsule; Commonly known   as: Macrobid   tamsulosin 0.4 mg 24 hr capsule; Commonly known as: Flomax       Outpatient Follow-Up  Future Appointments   Date Time Provider Department Center   2/5/2025  2:50 PM MD Santi Ayala   3/12/2025  8:40 AM MD Santi Ayala   3/26/2025  9:20 AM MD Santi Ayala   4/10/2025  9:15 AM MIKE Ochoa, RENETTA Hall   4/23/2025  8:40 AM MD Santi Ayala   5/1/2025  9:30 AM MIKE Ochoa, [place] [id number] [facility]   5/7/2025  9:10 AM MD Santi Ayala   5/15/2025  9:30 AM MIKE Ochoa, [place] [id number] [facility]   5/21/2025  8:30 AM MD Santi Ayala   5/29/2025  9:15 AM MIKE Ochoa, [place] [id number] [facility]   6/4/2025  8:30 AM MD Santi Ayala MD

## 2025-01-28 NOTE — PROGRESS NOTES
"Breanna Jackson \"Sheldon" is a 34 y.o. female on day 3 of admission presenting with Obstructed, uropathy.    Subjective   Patient feeling much better today       Objective     Physical Exam  Vitals reviewed.   Constitutional:       Appearance: Normal appearance.   HENT:      Head: Normocephalic and atraumatic.      Right Ear: Tympanic membrane, ear canal and external ear normal.      Left Ear: Tympanic membrane, ear canal and external ear normal.      Nose: Nose normal.      Mouth/Throat:      Pharynx: Oropharynx is clear.   Eyes:      Extraocular Movements: Extraocular movements intact.      Conjunctiva/sclera: Conjunctivae normal.      Pupils: Pupils are equal, round, and reactive to light.   Cardiovascular:      Rate and Rhythm: Normal rate and regular rhythm.      Pulses: Normal pulses.      Heart sounds: Normal heart sounds.   Pulmonary:      Effort: Pulmonary effort is normal.      Breath sounds: Normal breath sounds.   Abdominal:      General: Abdomen is flat. Bowel sounds are normal.      Palpations: Abdomen is soft.      Tenderness: There is abdominal tenderness.      Comments: Tenderness in left flank area    Musculoskeletal:      Cervical back: Normal range of motion and neck supple.   Skin:     General: Skin is warm and dry.   Neurological:      General: No focal deficit present.      Mental Status: She is alert and oriented to person, place, and time.   Psychiatric:         Mood and Affect: Mood normal.         Last Recorded Vitals  Blood pressure 128/73, pulse 86, temperature 36.8 °C (98.2 °F), temperature source Temporal, resp. rate 18, height 1.575 m (5' 2.01\"), weight 90.7 kg (200 lb), last menstrual period 11/23/2024, SpO2 96%.  Intake/Output last 3 Shifts:  I/O last 3 completed shifts:  In: 1120 (12.3 mL/kg) [P.O.:720; IV Piggyback:400]  Out: 900 (9.9 mL/kg) [Urine:900 (0.3 mL/kg/hr)]  Weight: 90.7 kg     Relevant Results               Scheduled medications  albuterol, 2.5 mg, nebulization, " Once  cefTRIAXone, 1 g, intravenous, q24h  docusate sodium, 100 mg, oral, BID  lidocaine, 0.1 mL, subcutaneous, Once  lidocaine, 1 patch, transdermal, Daily  polyethylene glycol, 17 g, oral, Daily      Continuous medications  potassium kdwfzul-F6-5.45%NaCl, 100 mL/hr, Last Rate: 100 mL/hr (01/28/25 0137)  lactated Ringer's, 100 mL/hr      PRN medications  PRN medications: acetaminophen **OR** acetaminophen **OR** acetaminophen, benzocaine-menthol, dextromethorphan-guaifenesin, fentaNYL PF, guaiFENesin, HYDROmorphone, meperidine, midazolam, morphine, ondansetron ODT **OR** ondansetron, ondansetron  Results for orders placed or performed during the hospital encounter of 01/25/25 (from the past 24 hours)   CBC   Result Value Ref Range    WBC 8.5 4.4 - 11.3 x10*3/uL    nRBC 0.0 0.0 - 0.0 /100 WBCs    RBC 3.98 (L) 4.00 - 5.20 x10*6/uL    Hemoglobin 11.3 (L) 12.0 - 16.0 g/dL    Hematocrit 34.4 (L) 36.0 - 46.0 %    MCV 86 80 - 100 fL    MCH 28.4 26.0 - 34.0 pg    MCHC 32.8 32.0 - 36.0 g/dL    RDW 12.9 11.5 - 14.5 %    Platelets 181 150 - 450 x10*3/uL   Basic Metabolic Panel   Result Value Ref Range    Glucose 125 (H) 74 - 99 mg/dL    Sodium 133 (L) 136 - 145 mmol/L    Potassium 3.8 3.5 - 5.3 mmol/L    Chloride 106 98 - 107 mmol/L    Bicarbonate 20 (L) 21 - 32 mmol/L    Anion Gap 11 10 - 20 mmol/L    Urea Nitrogen 7 6 - 23 mg/dL    Creatinine 0.83 0.50 - 1.05 mg/dL    eGFR >90 >60 mL/min/1.73m*2    Calcium 8.2 (L) 8.6 - 10.3 mg/dL       US guided percutaneous placement    Result Date: 1/27/2025  These images are not reportable by radiology and will not be interpreted by  Radiologists.                  Assessment/Plan   Assessment & Plan  Obstructed, uropathy    UTI (urinary tract infection)    9 weeks gestation of pregnancy ([facility])    Stent placement  Switch to oral antibiotic per ID  DC home     I spent  minutes in the professional and overall care of this patient.      Slime Morris MD

## 2025-01-28 NOTE — PROGRESS NOTES
Netta Jackson is a 34 y.o. female on day 3 of admission presenting with Obstructed, uropathy.    Subjective   Interval History:     Feeling better  Want to go home  Discussed antibiotic therapy  Interval discussion with OBGYN  Afebrile, no chills   at bedside     Objective   Range of Vitals (last 24 hours)  Heart Rate:  []   Temp:  [36.2 °C (97.2 °F)-37.3 °C (99.1 °F)]   Resp:  [17-26]   BP: ()/(7-88)   SpO2:  [93 %-100 %]   Daily Weight  01/26/25 : 90.7 kg (200 lb)    Body mass index is 36.57 kg/m².    Physical Exam  Constitutional:       Appearance: Normal appearance.   HENT:      Head: Normocephalic and atraumatic.      Nose: Nose normal.      Mouth/Throat:      Mouth: Mucous membranes are moist.      Pharynx: Oropharynx is clear.   Eyes:      General: No scleral icterus.  Cardiovascular:      Rate and Rhythm: Normal rate and regular rhythm.   Pulmonary:      Effort: Pulmonary effort is normal.   Abdominal:      General: Bowel sounds are normal.      Palpations: Abdomen is soft.   Musculoskeletal:         General: Normal range of motion.      Cervical back: Normal range of motion and neck supple.   Skin:     General: Skin is warm and dry.   Neurological:      General: No focal deficit present.      Mental Status: She is alert and oriented to person, place, and time. Mental status is at baseline.   Psychiatric:         Mood and Affect: Mood normal.         Behavior: Behavior normal.     Antibiotics  cefTRIAXone - 1 gram/50 mL  nitrofurantoin (macrocrystal-monohydrate) - 100 mg    Relevant Results  Labs  Results from last 72 hours   Lab Units 01/28/25  0458 01/26/25  0731 01/25/25  2048   WBC AUTO x10*3/uL 8.5 19.1* 16.0*   HEMOGLOBIN g/dL 11.3* 12.2 13.6   HEMATOCRIT % 34.4* 35.7* 39.1   PLATELETS AUTO x10*3/uL 181 241 248   NEUTROS PCT AUTO %  --   --  88.7   LYMPHS PCT AUTO %  --   --  4.9   MONOS PCT AUTO %  --   --  5.8   EOS PCT AUTO %  --   --  0.0     Results from last 72 hours   Lab  "Units 01/28/25  0458 01/26/25  0731 01/25/25  2048   SODIUM mmol/L 133* 131* 134*   POTASSIUM mmol/L 3.8 3.5 3.6   CHLORIDE mmol/L 106 102 102   CO2 mmol/L 20* 19* 20*   BUN mg/dL 7 6 9   CREATININE mg/dL 0.83 0.87 0.82   GLUCOSE mg/dL 125* 130* 124*   CALCIUM mg/dL 8.2* 8.4* 9.4   ANION GAP mmol/L 11 14 16   EGFR mL/min/1.73m*2 >90 90 >90     Results from last 72 hours   Lab Units 01/26/25  0731 01/25/25 2048   ALK PHOS U/L 49 55   BILIRUBIN TOTAL mg/dL 0.5 0.6   PROTEIN TOTAL g/dL 6.5 7.5   ALT U/L 16 20   AST U/L 13 15   ALBUMIN g/dL 3.8 4.4     Estimated Creatinine Clearance: 100 mL/min (by C-G formula based on SCr of 0.83 mg/dL).  No results found for: \"CRP\"  Microbiology  Reviewed     Imaging  US guided percutaneous placement    Result Date: 1/27/2025  These images are not reportable by radiology and will not be interpreted by  Radiologists.    CT abdomen pelvis wo IV contrast    Result Date: 1/26/2025  Interpreted By:  Moisés Wen, STUDY: CT ABDOMEN PELVIS WO IV CONTRAST;  1/26/2025 11:40 am   INDICATION: Signs/Symptoms:hydronephrosis.     COMPARISON: Pelvic ultrasound 25 January 2025; CT abdomen and pelvis without contrast 13 June 2018   ACCESSION NUMBER(S): [id number]   ORDERING CLINICIAN: ASLLY SUH   TECHNIQUE: CT of the abdomen and pelvis from the lung bases through the symphysis pubis without oral or IV contrast   FINDINGS: LOWER CHEST: No acute airspace disease.   BONES: No acute skeletal findings.   RIGHT KIDNEY AND URETER: Radiodense stone: Positive, but no obstructing/offending stone. Only six or seven small nonobstructing renal stones. None in the ureter Hydronephrosis: Negative Congenital variant anatomy: Negative. No duplicated ureter or other variant anatomy. Other: n/a   LEFT KIDNEY AND URETER: Radiodense stone: Positive, including an obstructing/offending stone. In the proximal ureter not far distal from the UPJ, about 3.5 cm above the level of the pelvic inlet and approximately 16-18 " cm proximal to the UVJ, a 3-4 mm long but too thin to measure attenuation obstructing stone. No others in the ureter. In the kidney, six or seven less than 3 mm (too small to accurately measure attenuation) nonobstructing stones Hydronephrosis: Negative Congenital variant anatomy: Negative. No duplicated ureter or other variant anatomy. Other: n/a   URINARY BLADDER: Radiodense stone: Negative Wall thickening / other inflammatory change: Negative Diverticula: Negative Congenital variant anatomy: Negative. No variant anatomy such as urachal remnant. Other: n/a   LIVER: Diffusely mildly fatty with one superimposed region of focal increased fatty deposition in a characteristic location near the gallbladder fossa. No obvious focal abnormality on this exam without contrast   SPLEEN: Normal. No enlargement.   PANCREAS: Normal. No CT evidence of acute or chronic pancreatitis. No obvious  duct dilation. No gross evidence of a mass, noting low sensitivity and specificity without IV contrast.   GALLBLADDER: Normal CT appearance. No dilation, calcified, or gas-containing stones.  Other types of gallstones could be occult on CT and detectable only by ultrasound.   BILE DUCTS: Normal. No biliary duct dilation.   ADRENAL GLANDS: Normal. No nodule or mass.   LYMPH NODES: No adenopathy, intraperitoneal, retroperitoneal, pelvic, inguinal or otherwise.   APPENDIX: Normal.  Not dilated, thick walled or in any other way inflamed in appearance.  No inflammatory change about the appendix.   COLON: Normal. No sign of acute diverticulitis or other colitis. No annular constricting mass.   SMALL BOWEL: Normal. No small bowel dilation or any other sign of small bowel obstruction.   STOMACH / DUODENUM: Grossly normal by CT which has limited sensitivity and specificity for the stomach and duodenum.   RETROPERITONEUM: Normal.  No acute hemorrhage or inflammatory change. Lymph nodes in a separate dedicated section.   OMENTUM, MESENTERY AND  PERITONEAL SPACES: Free intraperitoneal air: Negative Free intraperitoneal fluid: Negative Abscess: Negative Other: n/a   PELVIS: Early gravid uterus   VASCULATURE: Unenhanced CT appearance within normal limits. No abdominal aortic aneurysm or other acute finding.   ABDOMINAL WALL: Hernia: Negative Other: No acute or contributory abnormality.       3-4 mm long but too thin to accurately measure attenuation obstructing proximal left ureteral stone causing mild left hydronephrosis and perinephric edema   No other acute findings   MACRO: None   Signed by: Moisés Wen 1/26/2025 11:58 AM Dictation workstation:   [id number]    US PELVIS OB TRANSABDOMINAL W TRANSVAGINAL UP TO 1ST TRIMESTER    Result Date: 1/25/2025  Interpreted By:  Franci Rios, STUDY: US PELVIS OB TRANSABDOMINAL W TRANSVAGINAL UP TO 1ST TRIMESTER 1/25/2025 12:42 pm   INDICATION: 33 y/o   F with  Signs/Symptoms:left abd pain.     COMPARISON: None.   ACCESSION NUMBER(S): [id number]   ORDERING CLINICIAN: ZAC NOYOLA   TECHNIQUE: Transabdominal and transvaginal pelvic ultrasound was performed.   FINDINGS: UTERUS AND GESTATIONAL SAC:   Intrauterine gestational sac containing a yolk sac and single fetal pole with average crown-rump length of 16 mm which correlates with gestational age of 8 weeks 0 days +/-5 days. Fetal cardiac activity seen with heart rate of 160 beats per minute. No fluid collection around the gestational sac or focal uterine mass lesion demonstrated.   ADNEXA/OVARIES:   Right ovary  measures 2.6 x 1.7 x 2.2 cm. Intact flow on limited Doppler evaluation.   Left ovary  is not visualized.   Trace free fluid in the cul-de-sac, likely physiologic.       Single live intrauterine pregnancy with estimated gestational age of 8 weeks 0 days +/-5 days. Ultrasound [facility] is 09/06/2025. Based on LMP of 11/23/2024 the current gestational age would be 9 weeks 0 days.   Nonvisualization of the left ovary.   MACRO: None.   Signed by: Franci Rios 1/25/2025  12:51 PM Dictation workstation:   [id number]    US renal complete    Result Date: 1/25/2025  Interpreted By:  Ray Roger, STUDY: US RENAL COMPLETE;  1/25/2025 12:14 pm   INDICATION: Signs/Symptoms:left flank pain.     COMPARISON: None.   ACCESSION NUMBER(S): [id number]   ORDERING CLINICIAN: ZAC NOYOLA   TECHNIQUE: Multiple images of the kidneys were obtained  .   FINDINGS: RIGHT KIDNEY: The right kidney measures 10.9 cm in length. The renal cortical echogenicity and thickness are within normal limits. No hydronephrosis. A few echogenic nephroliths may be present measuring up to 8 mm. The adjacent liver is echogenic suggestive of steatosis.   LEFT KIDNEY: The left kidney measures 12.1 cm in length. The renal cortical echogenicity and thickness are within normal limits. Mild hydronephrosis. 5 mm nephrolith is suspected..   BLADDER: Partially distended. Bilateral ureteral jets were observed.         Mild left hydronephrosis. Suspect bilateral nephroliths. Suspect hepatic steatosis.   MACRO: None   Signed by: Ray Roger 1/25/2025 12:23 PM Dictation workstation:   [id number]    US pelvis limited    Transvaginal ultrasound shows fluid yolk sac and evidence of early cardiac activity.  Repeat ultrasound 3 to 4 weeks           Assessment/Plan   Sepsis, Fever,Leukocytosis  Pyuria versus urinary tract infection  Obstructing Uropathy, mild left hydronephrosis and perinephritic edema-status post left ureteral stent 1/27/2025  9 weeks gestation  Leukocytosis, resolved   History of urine culture with E. Coli-susceptible to all except intermediate ampicillin   Allergy penicillin, tolerating Ceftriaxone       IV ceftriaxone  Follow up Repeat urine culture  Follow blood culture  Monitor temperature and WBC  Supportive care  Monitor response to therapy  Urology follow up   OBGYN follow up    Discussed with Dr. Deng     Long term plan po keflex for total 14 days, close follow up with OBGYN     Total time spent caring for  the patient today was 25 minutes. This includes time spent before the visit reviewing the chart, time spent during the visit, and time spent after the visit on documentation     SOUMYA Wellington-CNP

## 2025-01-28 NOTE — CARE PLAN
Problem: Pain - Adult  Goal: Verbalizes/displays adequate comfort level or baseline comfort level  Outcome: Progressing     Problem: Discharge Planning  Goal: Discharge to home or other facility with appropriate resources  Outcome: Progressing     Problem: Nutrition  Goal: Nutrient intake appropriate for maintaining nutritional needs  Outcome: Progressing   The patient's goals for the shift include      The clinical goals for the shift include discharge    Over the shift, the patient did not make progress toward the following goals. Barriers to progression include na. Recommendations to address these barriers include na.

## 2025-01-28 NOTE — PROGRESS NOTES
"Breanna Jackson \"Sheldon" is a 34 y.o. female on day 2 of admission presenting with Obstructed, uropathy.    Subjective   Patient still having a lot of pain in the left flank area.  Complaining of nausea.  Not running fever       Objective     Physical Exam  Vitals reviewed.   Constitutional:       Appearance: Normal appearance.   HENT:      Head: Normocephalic and atraumatic.      Right Ear: Tympanic membrane, ear canal and external ear normal.      Left Ear: Tympanic membrane, ear canal and external ear normal.      Nose: Nose normal.      Mouth/Throat:      Pharynx: Oropharynx is clear.   Eyes:      Extraocular Movements: Extraocular movements intact.      Conjunctiva/sclera: Conjunctivae normal.      Pupils: Pupils are equal, round, and reactive to light.   Cardiovascular:      Rate and Rhythm: Normal rate and regular rhythm.      Pulses: Normal pulses.      Heart sounds: Normal heart sounds.   Pulmonary:      Effort: Pulmonary effort is normal.      Breath sounds: Normal breath sounds.   Abdominal:      General: Abdomen is flat. Bowel sounds are normal.      Palpations: Abdomen is soft.      Tenderness: There is abdominal tenderness.      Comments: Tenderness in left flank area    Musculoskeletal:      Cervical back: Normal range of motion and neck supple.   Skin:     General: Skin is warm and dry.   Neurological:      General: No focal deficit present.      Mental Status: She is alert and oriented to person, place, and time.   Psychiatric:         Mood and Affect: Mood normal.         Last Recorded Vitals  Blood pressure 143/66, pulse 65, temperature 36.6 °C (97.9 °F), temperature source Temporal, resp. rate 19, height 1.575 m (5' 2.01\"), weight 90.7 kg (200 lb), last menstrual period 11/23/2024, SpO2 97%.  Intake/Output last 3 Shifts:  I/O last 3 completed shifts:  In: 1130 (12.5 mL/kg) [P.O.:240; IV Piggyback:890]  Out: 900 (9.9 mL/kg) [Urine:900 (0.3 mL/kg/hr)]  Weight: 90.7 kg     Relevant Results             "   Scheduled medications  albuterol, 2.5 mg, nebulization, Once  cefTRIAXone, 1 g, intravenous, q24h  docusate sodium, 100 mg, oral, BID  lidocaine, 0.1 mL, subcutaneous, Once  lidocaine, 1 patch, transdermal, Daily  polyethylene glycol, 17 g, oral, Daily      Continuous medications  potassium qnmgcbs-A1-5.45%NaCl, 100 mL/hr, Last Rate: 100 mL/hr (01/27/25 1720)  lactated Ringer's, 100 mL/hr      PRN medications  PRN medications: acetaminophen **OR** acetaminophen **OR** acetaminophen, benzocaine-menthol, dextromethorphan-guaifenesin, fentaNYL PF, guaiFENesin, HYDROmorphone, meperidine, midazolam, morphine, ondansetron ODT **OR** ondansetron, ondansetron  No results found for this or any previous visit (from the past 24 hours).    US guided percutaneous placement    Result Date: 1/27/2025  These images are not reportable by radiology and will not be interpreted by  Radiologists.    CT abdomen pelvis wo IV contrast    Result Date: 1/26/2025  Interpreted By:  Moisés Wen, STUDY: CT ABDOMEN PELVIS WO IV CONTRAST;  1/26/2025 11:40 am   INDICATION: Signs/Symptoms:hydronephrosis.     COMPARISON: Pelvic ultrasound 25 January 2025; CT abdomen and pelvis without contrast 13 June 2018   ACCESSION NUMBER(S): [id number]   ORDERING CLINICIAN: SALLY SUH   TECHNIQUE: CT of the abdomen and pelvis from the lung bases through the symphysis pubis without oral or IV contrast   FINDINGS: LOWER CHEST: No acute airspace disease.   BONES: No acute skeletal findings.   RIGHT KIDNEY AND URETER: Radiodense stone: Positive, but no obstructing/offending stone. Only six or seven small nonobstructing renal stones. None in the ureter Hydronephrosis: Negative Congenital variant anatomy: Negative. No duplicated ureter or other variant anatomy. Other: n/a   LEFT KIDNEY AND URETER: Radiodense stone: Positive, including an obstructing/offending stone. In the proximal ureter not far distal from the UPJ, about 3.5 cm above the level of the pelvic  inlet and approximately 16-18 cm proximal to the UVJ, a 3-4 mm long but too thin to measure attenuation obstructing stone. No others in the ureter. In the kidney, six or seven less than 3 mm (too small to accurately measure attenuation) nonobstructing stones Hydronephrosis: Negative Congenital variant anatomy: Negative. No duplicated ureter or other variant anatomy. Other: n/a   URINARY BLADDER: Radiodense stone: Negative Wall thickening / other inflammatory change: Negative Diverticula: Negative Congenital variant anatomy: Negative. No variant anatomy such as urachal remnant. Other: n/a   LIVER: Diffusely mildly fatty with one superimposed region of focal increased fatty deposition in a characteristic location near the gallbladder fossa. No obvious focal abnormality on this exam without contrast   SPLEEN: Normal. No enlargement.   PANCREAS: Normal. No CT evidence of acute or chronic pancreatitis. No obvious  duct dilation. No gross evidence of a mass, noting low sensitivity and specificity without IV contrast.   GALLBLADDER: Normal CT appearance. No dilation, calcified, or gas-containing stones.  Other types of gallstones could be occult on CT and detectable only by ultrasound.   BILE DUCTS: Normal. No biliary duct dilation.   ADRENAL GLANDS: Normal. No nodule or mass.   LYMPH NODES: No adenopathy, intraperitoneal, retroperitoneal, pelvic, inguinal or otherwise.   APPENDIX: Normal.  Not dilated, thick walled or in any other way inflamed in appearance.  No inflammatory change about the appendix.   COLON: Normal. No sign of acute diverticulitis or other colitis. No annular constricting mass.   SMALL BOWEL: Normal. No small bowel dilation or any other sign of small bowel obstruction.   STOMACH / DUODENUM: Grossly normal by CT which has limited sensitivity and specificity for the stomach and duodenum.   RETROPERITONEUM: Normal.  No acute hemorrhage or inflammatory change. Lymph nodes in a separate dedicated section.    OMENTUM, MESENTERY AND PERITONEAL SPACES: Free intraperitoneal air: Negative Free intraperitoneal fluid: Negative Abscess: Negative Other: n/a   PELVIS: Early gravid uterus   VASCULATURE: Unenhanced CT appearance within normal limits. No abdominal aortic aneurysm or other acute finding.   ABDOMINAL WALL: Hernia: Negative Other: No acute or contributory abnormality.       3-4 mm long but too thin to accurately measure attenuation obstructing proximal left ureteral stone causing mild left hydronephrosis and perinephric edema   No other acute findings   MACRO: None   Signed by: Moisés Wen 1/26/2025 11:58 AM Dictation workstation:   [id number]                  Assessment/Plan   Assessment & Plan  Obstructed, uropathy    UTI (urinary tract infection)    9 weeks gestation of pregnancy ([facility])    Patient is on antibiotic  Still spiking fever  Stent plan for ureteral stone today  Consult ID as patient is pregnant and has limited options for antibiotic    Zofran dose for nausea  Continue pain medication  Continue IV fluids       I spent  minutes in the professional and overall care of this patient.      Slime Morris MD

## 2025-01-28 NOTE — CARE PLAN
Problem: Pain - Adult  Goal: Verbalizes/displays adequate comfort level or baseline comfort level  Outcome: Progressing  Flowsheets (Taken 1/28/2025 0205)  Verbalizes/displays adequate comfort level or baseline comfort level:   Notify Licensed Independent Practitioner if interventions unsuccessful or patient reports new pain   Consider cultural and social influences on pain and pain management   Implement non-pharmacological measures as appropriate and evaluate response   Administer analgesics based on type and severity of pain and evaluate response   Assess pain using appropriate pain scale   Encourage patient to monitor pain and request assistance     Problem: Discharge Planning  Goal: Discharge to home or other facility with appropriate resources  Outcome: Progressing  Flowsheets (Taken 1/28/2025 0205)  Discharge to home or other facility with appropriate resources:   Refer to discharge planning if patient needs post-hospital services based on physician order or complex needs related to functional status, cognitive ability or social support system   Arrange for interpreters to assist at discharge as needed   Identify discharge learning needs (meds, wound care, etc)   Arrange for needed discharge resources and transportation as appropriate   Identify barriers to discharge with patient and caregiver     Problem: Nutrition  Goal: Nutrient intake appropriate for maintaining nutritional needs  Outcome: Progressing    The clinical goals for the shift include pain control

## 2025-01-29 ENCOUNTER — TELEPHONE (OUTPATIENT)
Dept: INPATIENT UNIT | Facility: HOSPITAL | Age: 35
End: 2025-01-29
Payer: MEDICAID

## 2025-01-29 LAB — BACTERIA UR CULT: NORMAL

## 2025-01-30 LAB
BACTERIA BLD CULT: NORMAL
BACTERIA BLD CULT: NORMAL

## 2025-01-31 ENCOUNTER — TELEPHONE (OUTPATIENT)
Dept: UROLOGY | Facility: CLINIC | Age: 35
End: 2025-01-31
Payer: MEDICAID

## 2025-01-31 NOTE — TELEPHONE ENCOUNTER
Attempted to reach patient regarding scheduling virtual visit with Dr. De Guzman regarding her stent. Left office phone number and business hours. Asked patient to call us back to schedule.

## 2025-02-04 ENCOUNTER — APPOINTMENT (OUTPATIENT)
Dept: UROLOGY | Facility: CLINIC | Age: 35
End: 2025-02-04
Payer: MEDICAID

## 2025-02-04 DIAGNOSIS — N20.2 CALCULUS OF KIDNEY AND URETER: Primary | ICD-10-CM

## 2025-02-04 DIAGNOSIS — N12 PYELONEPHRITIS: ICD-10-CM

## 2025-02-04 PROCEDURE — 1036F TOBACCO NON-USER: CPT | Performed by: SURGERY

## 2025-02-04 PROCEDURE — 99212 OFFICE O/P EST SF 10 MIN: CPT | Performed by: SURGERY

## 2025-02-04 NOTE — PROGRESS NOTES
I, Dr. Tania De Guzman, saw this patient via Telehealth today.  The details of the visit are listed below.  This visit was completed via phone only.       History of Present Illness (HPI):  TODAY (25)  Breanna Jackson is a 34 y.o. female presenting virtually kidney stones.  She was hospitalized several weeks ago with an obstructing left ureteral stone.  She is currently 2 months pregnant.  She had an infection at the time of her hospital stay.  A ureteral stent was placed.  She does have a prior history of stone disease.  She remains on oral antibiotics for her pyelonephritis.  Today she reports doing well.  She has very minimal stent symptoms.    Past Medical History:   Diagnosis Date    Encounter for full-term uncomplicated delivery      (spontaneous vaginal delivery)    Encounter for supervision of normal pregnancy, unspecified, first trimester     Encounter for pregnancy related examination in first trimester    Encounter for supervision of normal pregnancy, unspecified, first trimester     Encounter for pregnancy related examination in first trimester    Other specified health status     No pertinent past medical history    Other specified health status 10/10/2019    No pertinent past surgical history    Personal history of urinary calculi     History of kidney stones    Unspecified infection of urinary tract in pregnancy, second trimester ([facility] 2019    UTI (urinary tract infection) in pregnancy in second trimester     Past Surgical History:   Procedure Laterality Date    OTHER SURGICAL HISTORY  2020    Oral surgery    OTHER SURGICAL HISTORY  06/10/2019    Repair of bladder     Family History   Problem Relation Name Age of Onset    Other (Cardiac Disorder) Mother      Hypothyroidism Mother      Breast cancer Mother      Uterine cancer Mother      Heart murmur Son      Other (LSVC (Persistent left superior vena cava)) Son      Other (LSVC (persistent left suprior vena cava)) Other Child       Social History     Tobacco Use   Smoking Status Never   Smokeless Tobacco Never     Current Outpatient Medications   Medication Sig Dispense Refill    albuterol 90 mcg/actuation inhaler Inhale 2 puffs 4 times a day. 36 g 1    cephalexin (Keflex) 500 mg capsule Take 1 capsule (500 mg) by mouth every 6 hours for 13 days. 52 capsule 0    ergocalciferol (Vitamin D-2) 1.25 MG (80926 UT) capsule Take 1 capsule (1,250 mcg) by mouth 1 (one) time per week. 4 capsule 11    prenatal no115/iron/folic acid (PRENATAL 19 ORAL) Take by mouth.       No current facility-administered medications for this visit.     Allergies   Allergen Reactions    Amoxicillin Anaphylaxis and Hives    Lactose Diarrhea    Penicillins Anaphylaxis and Hives    Prednisone Anaphylaxis and Hives    Raspberry Anaphylaxis    Wheat Hives    Latex Hives     Past medical, surgical, family and social history in the chart was reviewed and is accurate including any additions to what is in this HPI.    Review of systems (ROS):   Pertinent information as listed in the HPI.     Objective   There were no vitals taken for this visit.  PHYSICAL EXAM:  Exam limited 2/2 telehealth visit.     Lab Review  CBC, BMP    ASSESSMENT:  Problem List Items Addressed This Visit    None  Visit Diagnoses       Calculus of kidney and ureter    -  Primary    Pyelonephritis               PLAN:  We discussed management options for her ureteral stone.  Given her current pregnancy I informed her that the safest option would be to maintain a stent and have it changed every 3 months until she delivers her baby.  The other option would be to consider ureteroscopy, in which case the safest window would be during her second trimester.  At this point she prefers keeping the stent in.  I will arrange for a stent exchange within the next several months.    All questions were answered to the patient’s satisfaction.  Patient agrees with the plan and wishes to proceed.  Follow-up will be scheduled  appropriately.       Tania De Guzman MD

## 2025-02-05 ENCOUNTER — APPOINTMENT (OUTPATIENT)
Dept: OBSTETRICS AND GYNECOLOGY | Facility: CLINIC | Age: 35
End: 2025-02-05
Payer: MEDICAID

## 2025-02-05 VITALS — BODY MASS INDEX: 35.47 KG/M2 | WEIGHT: 194 LBS | SYSTOLIC BLOOD PRESSURE: 122 MMHG | DIASTOLIC BLOOD PRESSURE: 84 MMHG

## 2025-02-05 DIAGNOSIS — O99.210 OBESITY IN PREGNANCY (HHS-HCC): Primary | ICD-10-CM

## 2025-02-05 DIAGNOSIS — R82.90 ABNORMAL URINE FINDINGS: ICD-10-CM

## 2025-02-05 DIAGNOSIS — Z32.01 PREGNANCY TEST POSITIVE (HHS-HCC): ICD-10-CM

## 2025-02-05 DIAGNOSIS — O36.80X0 PREGNANCY WITH INCONCLUSIVE FETAL VIABILITY, SINGLE OR UNSPECIFIED FETUS: ICD-10-CM

## 2025-02-05 LAB
POC BLOOD, URINE: ABNORMAL
POC GLUCOSE, URINE: NEGATIVE MG/DL
POC LEUKOCYTES, URINE: ABNORMAL
POC NITRITE,URINE: NEGATIVE
POC PROTEIN, URINE: ABNORMAL MG/DL

## 2025-02-05 PROCEDURE — 76830 TRANSVAGINAL US NON-OB: CPT | Performed by: OBSTETRICS & GYNECOLOGY

## 2025-02-05 PROCEDURE — 99214 OFFICE O/P EST MOD 30 MIN: CPT | Performed by: OBSTETRICS & GYNECOLOGY

## 2025-02-05 NOTE — PROGRESS NOTES
"Subjective   Patient ID [id number]   Netta Jackson is a 34 y.o.  at 9w4d with a working estimated date of delivery of 2025, by Ultrasound who presents for a routine prenatal visit. She denies vaginal bleeding, leakage of fluid, decreased fetal movements, and contractions.    Her pregnancy is complicated by:  Recent hospitalization for UTI and kidney stone.  Had a stent placed left side.  Unfortunately she has had stents before.  Being followed by urology.  Overall is feeling better now.  Labs reviewed.  Follow-up 2 weeks consider noninvasive prenatal testing.  Can start baby aspirin after next visit    Objective   Physical Exam  Weight: 88 kg (194 lb), Pregravid BMI: Could not be calculated  Expected Total Weight Gain: Could not be calculated   BP: 122/84         Prenatal Labs  Urine dip:  Lab Results   Component Value Date    KETONESU 40 (2+) (A) 2025     Lab Results   Component Value Date    HGB 11.3 (L) 2025    HCT 34.4 (L) 2025    ABO O 2025    HEPBSAG Nonreactive 2025     No results found for: \"PAPPA\", \"AFP\", \"HCG\", \"ESTRIOL\", \"INHBA\"    Imaging  The most recent ultrasound was performed on 2025 with a study GA of  .          Assessment/Plan       Continue prenatal vitamin.  Labs reviewed.  Order placed for anatomy scan at 20 weeks.  Ultrasound shows 9.4-week viable intrauterine pregnancy  Follow up in 2 weeks for a routine prenatal visit.  Counseled on noninvasive prenatal testing  "

## 2025-02-07 LAB — BACTERIA UR CULT: NORMAL

## 2025-02-12 ENCOUNTER — APPOINTMENT (OUTPATIENT)
Dept: PRIMARY CARE | Facility: CLINIC | Age: 35
End: 2025-02-12
Payer: MEDICAID

## 2025-02-12 VITALS
BODY MASS INDEX: 35.88 KG/M2 | DIASTOLIC BLOOD PRESSURE: 84 MMHG | WEIGHT: 195 LBS | HEIGHT: 62 IN | SYSTOLIC BLOOD PRESSURE: 120 MMHG

## 2025-02-12 DIAGNOSIS — N39.0 URINARY TRACT INFECTION WITHOUT HEMATURIA, SITE UNSPECIFIED: ICD-10-CM

## 2025-02-12 DIAGNOSIS — N20.2 CALCULUS OF KIDNEY AND URETER: Primary | ICD-10-CM

## 2025-02-12 DIAGNOSIS — E55.9 VITAMIN D DEFICIENCY: ICD-10-CM

## 2025-02-12 PROCEDURE — 99213 OFFICE O/P EST LOW 20 MIN: CPT | Performed by: INTERNAL MEDICINE

## 2025-02-12 PROCEDURE — 3008F BODY MASS INDEX DOCD: CPT | Performed by: INTERNAL MEDICINE

## 2025-02-12 NOTE — PROGRESS NOTES
"Subjective   Patient ID: Netta aJckson is a 34 y.o. female who presents for Hospital Follow-up.    HPI   Patient is here for hospital follow-up.  She was hospitalized for pyelonephritis and obstructive uropathy.  She had a stent placed which needs to be replaced every 3 months as stone cannot be removed while she is pregnant.  She is doing fine with her pregnancy.  She has history of kidney stone in 2018 and had a stent placed.  She drinks plenty of water but she was taking vitamin D 50,000.  Her vitamin D levels has been improved first time in her life    Past recap  Patient is here for follow-up on blood work  Follow-up: Vitamin D refills     patient is here for follow-up.  Did not do blood work.  Needs refill on controlled     patient is here for physical  Also would like birth control prescription until she can see the OB/GYN  She was recommended to do uterine ablation the for heavy menstrual cycle but patient is not willing to do it     past recap  Patient is here for presurgical clearance  She is going for right knee arthroscopic surgery  In August she stepped wrong and torn knee ACL     Past medical history: Ureteric stone, wisdom tooth removed, allergies  Social history:  non-smoker nondrinker 4 pregnancies 3 kids  Occupation: Stay home mom  Family history: Mother with von Willebrand's disease ovarian and breast cancer and endometriosis ,a fib, dont know fatherbrian  of MI young  Review of Systems    Objective   /84   Ht 1.575 m (5' 2\")   Wt 88.5 kg (195 lb)   LMP 2024   BMI 35.67 kg/m²     Physical Exam  Vitals reviewed.   Constitutional:       Appearance: Normal appearance.   HENT:      Head: Normocephalic and atraumatic.      Right Ear: Tympanic membrane, ear canal and external ear normal.      Left Ear: Tympanic membrane, ear canal and external ear normal.      Nose: Nose normal.      Mouth/Throat:      Pharynx: Oropharynx is clear.   Eyes:      Extraocular Movements: " Extraocular movements intact.      Conjunctiva/sclera: Conjunctivae normal.      Pupils: Pupils are equal, round, and reactive to light.   Cardiovascular:      Rate and Rhythm: Normal rate and regular rhythm.      Pulses: Normal pulses.      Heart sounds: Normal heart sounds.   Pulmonary:      Effort: Pulmonary effort is normal.      Breath sounds: Normal breath sounds.   Abdominal:      General: Abdomen is flat. Bowel sounds are normal.      Palpations: Abdomen is soft.   Musculoskeletal:      Cervical back: Normal range of motion and neck supple.   Skin:     General: Skin is warm and dry.   Neurological:      General: No focal deficit present.      Mental Status: She is alert and oriented to person, place, and time.   Psychiatric:         Mood and Affect: Mood normal.         Assessment/Plan   Problem List Items Addressed This Visit          Endocrine/Metabolic    Vitamin D deficiency       Genitourinary and Reproductive    UTI (urinary tract infection)    Calculus of kidney and ureter - Primary   Past recap  physical normal  Routine blood work ordered  OB/GYN notes reviewed  Norethindrone 0.35 mg p.o. daily 3-month supply    1/5/2024  Blood work patient has not done yet blood work ordered  Vitamin D prescription based on results  Refills given on birth control    1/15/2024  Blood work reviewed  Vitamin D still low at  50,000 q. weekly for 1 year  Cholesterol has gone up  Discussed diet and exercise  Patient wants to try that before going on medication  Patient could be having issues with his family history making his cholesterol go up  With still high  Will consider statin  Follow-up blood work in 6 months    2/12/2025  UTI resolved  Repeat urine cultures recently done negative for infection  Advised patient to stop vitamin D supplements especially high-dose vitamin D  Pain discussed lifestyle changes diet changes to help prevent kidney stone formation  Appreciated for drinking plenty of water avoid soft drinks  and processed food

## 2025-02-17 ENCOUNTER — APPOINTMENT (OUTPATIENT)
Dept: OBSTETRICS AND GYNECOLOGY | Facility: CLINIC | Age: 35
End: 2025-02-17
Payer: MEDICAID

## 2025-02-19 ENCOUNTER — APPOINTMENT (OUTPATIENT)
Dept: OBSTETRICS AND GYNECOLOGY | Facility: CLINIC | Age: 35
End: 2025-02-19
Payer: MEDICAID

## 2025-02-19 VITALS — WEIGHT: 195 LBS | DIASTOLIC BLOOD PRESSURE: 84 MMHG | SYSTOLIC BLOOD PRESSURE: 128 MMHG | BODY MASS INDEX: 35.67 KG/M2

## 2025-02-19 DIAGNOSIS — O99.210 OBESITY IN PREGNANCY (HHS-HCC): ICD-10-CM

## 2025-02-19 DIAGNOSIS — Z3A.11 11 WEEKS GESTATION OF PREGNANCY (HHS-HCC): ICD-10-CM

## 2025-02-19 DIAGNOSIS — Z11.3 ROUTINE SCREENING FOR STI (SEXUALLY TRANSMITTED INFECTION): ICD-10-CM

## 2025-02-19 DIAGNOSIS — R35.0 URINE FREQUENCY: ICD-10-CM

## 2025-02-19 DIAGNOSIS — O36.80X0 PREGNANCY WITH INCONCLUSIVE FETAL VIABILITY, SINGLE OR UNSPECIFIED FETUS: Primary | ICD-10-CM

## 2025-02-19 DIAGNOSIS — Z51.81 ENCOUNTER FOR THERAPEUTIC DRUG MONITORING: ICD-10-CM

## 2025-02-19 PROCEDURE — 81002 URINALYSIS NONAUTO W/O SCOPE: CPT | Performed by: OBSTETRICS & GYNECOLOGY

## 2025-02-19 PROCEDURE — 76816 OB US FOLLOW-UP PER FETUS: CPT | Performed by: OBSTETRICS & GYNECOLOGY

## 2025-02-19 PROCEDURE — 99214 OFFICE O/P EST MOD 30 MIN: CPT | Performed by: OBSTETRICS & GYNECOLOGY

## 2025-02-19 NOTE — PROGRESS NOTES
"Subjective   Patient ID [id number]   Netta Jackson is a 34 y.o.  at 11w4d with a working estimated date of delivery of 2025, by Ultrasound who presents for a routine prenatal visit. She denies vaginal bleeding, leakage of fluid, decreased fetal movements, and contractions.    Her pregnancy is complicated by:  History kidney stone.  Has a stent in place.  Seeing urology  having stent replaced .  Will return to office for NIPT.  Labs reviewed.  Continue prenatal vitamins and daily baby aspirin.    Objective   Physical Exam  Weight: 88.5 kg (195 lb), Pregravid BMI: Could not be calculated  Expected Total Weight Gain: Could not be calculated   BP: 128/84         Prenatal Labs  Urine dip:  Lab Results   Component Value Date    KETONESU 40 (2+) (A) 2025     Lab Results   Component Value Date    HGB 11.3 (L) 2025    HCT 34.4 (L) 2025    ABO O 2025    HEPBSAG Nonreactive 2025     No results found for: \"PAPPA\", \"AFP\", \"HCG\", \"ESTRIOL\", \"INHBA\"    Imaging  The most recent ultrasound was performed on   with a study GA of  .          Assessment/Plan       Continue prenatal vitamin.  Labs reviewed.  Order placed for anatomy scan at 20 weeks.    Follow up in 4 weeks for a routine prenatal visit.Subjective   Patient ID [id number]   Netta Jackson is a 34 y.o.  at 11w4d with a working estimated date of delivery of 2025, by Ultrasound who presents for an initial prenatal visit. This pregnancy is planned.    Her pregnancy is complicated by:  Kidney stone, BMI of 35.    OB History    Para Term  AB Living   5 3 3   1 3   SAB IAB Ectopic Multiple Live Births   1       3      # Outcome Date GA Lbr Efrain/2nd Weight Sex Type Anes PTL Lv   5 Current            4 Term 20   3.43 kg M Vag-Spont EPI  BOO   3 Term 17   3.175 kg F Vag-Spont EPI  BOO   2 Term 13   3.118 kg M Vag-Spont EPI  BOO   1 SAB                   Objective   Physical Exam  Weight: 88.5 " kg (195 lb)  Expected Total Weight Gain: Could not be calculated   Pregravid BMI: Could not be calculated  BP: 128/84          OBGyn Exam    Prenatal Labs  Reviewed    Assessment/Plan   Abdominal ultrasound shows viable king intrauterine pregnancy    Immunizations: Reviewed  Prenatal Labs ordered  Daily prenatal vitamins prescribed  First trimester screening and second trimester screening discussed. Patient decided to proceed  Follow up in 4 weeks for return OB visit.

## 2025-02-22 LAB
AMPHETAMINES UR QL: NEGATIVE NG/ML
BACTERIA UR CULT: ABNORMAL
BARBITURATES UR QL: NEGATIVE NG/ML
BENZODIAZ UR QL: NEGATIVE NG/ML
BZE UR QL: NEGATIVE NG/ML
C TRACH RRNA SPEC QL NAA+PROBE: NOT DETECTED
CREAT UR-MCNC: 93.3 MG/DL
METHADONE UR QL: NEGATIVE NG/ML
N GONORRHOEA RRNA SPEC QL NAA+PROBE: NOT DETECTED
OPIATES UR QL: NEGATIVE NG/ML
OXIDANTS UR QL: NEGATIVE MCG/ML
OXYCODONE UR QL: NEGATIVE NG/ML
PCP UR QL: NEGATIVE NG/ML
PH UR: 7.3 [PH] (ref 4.5–9)
QUEST GC CT AMPLIFIED (ALWAYS MESSAGE): NORMAL
QUEST NOTES AND COMMENTS: NORMAL
THC UR QL: NEGATIVE NG/ML

## 2025-02-24 ENCOUNTER — PATIENT MESSAGE (OUTPATIENT)
Dept: OBSTETRICS AND GYNECOLOGY | Facility: CLINIC | Age: 35
End: 2025-02-24
Payer: MEDICAID

## 2025-02-26 ENCOUNTER — HOSPITAL ENCOUNTER (EMERGENCY)
Facility: HOSPITAL | Age: 35
Discharge: HOME | End: 2025-02-26
Attending: STUDENT IN AN ORGANIZED HEALTH CARE EDUCATION/TRAINING PROGRAM
Payer: MEDICAID

## 2025-02-26 ENCOUNTER — APPOINTMENT (OUTPATIENT)
Dept: RADIOLOGY | Facility: HOSPITAL | Age: 35
End: 2025-02-26
Payer: MEDICAID

## 2025-02-26 VITALS
HEART RATE: 113 BPM | HEIGHT: 62 IN | OXYGEN SATURATION: 100 % | DIASTOLIC BLOOD PRESSURE: 96 MMHG | TEMPERATURE: 99.9 F | WEIGHT: 195.11 LBS | SYSTOLIC BLOOD PRESSURE: 141 MMHG | BODY MASS INDEX: 35.9 KG/M2 | RESPIRATION RATE: 16 BRPM

## 2025-02-26 DIAGNOSIS — R10.9 ACUTE FLANK PAIN: Primary | ICD-10-CM

## 2025-02-26 DIAGNOSIS — N30.01 ACUTE CYSTITIS WITH HEMATURIA: ICD-10-CM

## 2025-02-26 LAB
ABO GROUP (TYPE) IN BLOOD: NORMAL
ALBUMIN SERPL BCP-MCNC: 4 G/DL (ref 3.4–5)
ALP SERPL-CCNC: 75 U/L (ref 33–110)
ALT SERPL W P-5'-P-CCNC: 12 U/L (ref 7–45)
ANION GAP SERPL CALCULATED.3IONS-SCNC: 14 MMOL/L (ref 10–20)
APPEARANCE UR: CLEAR
AST SERPL W P-5'-P-CCNC: 11 U/L (ref 9–39)
B-HCG SERPL-ACNC: ABNORMAL MIU/ML
BACTERIA #/AREA URNS AUTO: ABNORMAL /HPF
BASOPHILS # BLD AUTO: 0.01 X10*3/UL (ref 0–0.1)
BASOPHILS NFR BLD AUTO: 0.1 %
BILIRUB SERPL-MCNC: 0.4 MG/DL (ref 0–1.2)
BILIRUB UR STRIP.AUTO-MCNC: NEGATIVE MG/DL
BUN SERPL-MCNC: 5 MG/DL (ref 6–23)
CALCIUM SERPL-MCNC: 9 MG/DL (ref 8.6–10.3)
CHLORIDE SERPL-SCNC: 98 MMOL/L (ref 98–107)
CO2 SERPL-SCNC: 22 MMOL/L (ref 21–32)
COLOR UR: ABNORMAL
CREAT SERPL-MCNC: 0.79 MG/DL (ref 0.5–1.05)
EGFRCR SERPLBLD CKD-EPI 2021: >90 ML/MIN/1.73M*2
EOSINOPHIL # BLD AUTO: 0 X10*3/UL (ref 0–0.7)
EOSINOPHIL NFR BLD AUTO: 0 %
ERYTHROCYTE [DISTWIDTH] IN BLOOD BY AUTOMATED COUNT: 12.7 % (ref 11.5–14.5)
GLUCOSE SERPL-MCNC: 98 MG/DL (ref 74–99)
GLUCOSE UR STRIP.AUTO-MCNC: NORMAL MG/DL
HCT VFR BLD AUTO: 34.5 % (ref 36–46)
HGB BLD-MCNC: 11.5 G/DL (ref 12–16)
IMM GRANULOCYTES # BLD AUTO: 0.05 X10*3/UL (ref 0–0.7)
IMM GRANULOCYTES NFR BLD AUTO: 0.4 % (ref 0–0.9)
KETONES UR STRIP.AUTO-MCNC: ABNORMAL MG/DL
LEUKOCYTE ESTERASE UR QL STRIP.AUTO: ABNORMAL
LIPASE SERPL-CCNC: 12 U/L (ref 9–82)
LYMPHOCYTES # BLD AUTO: 0.86 X10*3/UL (ref 1.2–4.8)
LYMPHOCYTES NFR BLD AUTO: 7.2 %
MAGNESIUM SERPL-MCNC: 1.51 MG/DL (ref 1.6–2.4)
MCH RBC QN AUTO: 28.4 PG (ref 26–34)
MCHC RBC AUTO-ENTMCNC: 33.3 G/DL (ref 32–36)
MCV RBC AUTO: 85 FL (ref 80–100)
MONOCYTES # BLD AUTO: 0.67 X10*3/UL (ref 0.1–1)
MONOCYTES NFR BLD AUTO: 5.6 %
NEUTROPHILS # BLD AUTO: 10.34 X10*3/UL (ref 1.2–7.7)
NEUTROPHILS NFR BLD AUTO: 86.7 %
NITRITE UR QL STRIP.AUTO: NEGATIVE
NRBC BLD-RTO: 0 /100 WBCS (ref 0–0)
PH UR STRIP.AUTO: 7 [PH]
PLATELET # BLD AUTO: 282 X10*3/UL (ref 150–450)
POTASSIUM SERPL-SCNC: 3.6 MMOL/L (ref 3.5–5.3)
PROT SERPL-MCNC: 7.7 G/DL (ref 6.4–8.2)
PROT UR STRIP.AUTO-MCNC: ABNORMAL MG/DL
RBC # BLD AUTO: 4.05 X10*6/UL (ref 4–5.2)
RBC # UR STRIP.AUTO: ABNORMAL MG/DL
RBC #/AREA URNS AUTO: ABNORMAL /HPF
RH FACTOR (ANTIGEN D): NORMAL
SODIUM SERPL-SCNC: 130 MMOL/L (ref 136–145)
SP GR UR STRIP.AUTO: 1.01
SQUAMOUS #/AREA URNS AUTO: ABNORMAL /HPF
UROBILINOGEN UR STRIP.AUTO-MCNC: NORMAL MG/DL
WBC # BLD AUTO: 11.9 X10*3/UL (ref 4.4–11.3)
WBC #/AREA URNS AUTO: >50 /HPF
YEAST BUDDING #/AREA UR COMP ASSIST: PRESENT /HPF

## 2025-02-26 PROCEDURE — 85025 COMPLETE CBC W/AUTO DIFF WBC: CPT | Performed by: PHYSICIAN ASSISTANT

## 2025-02-26 PROCEDURE — 76815 OB US LIMITED FETUS(S): CPT

## 2025-02-26 PROCEDURE — 83690 ASSAY OF LIPASE: CPT | Performed by: PHYSICIAN ASSISTANT

## 2025-02-26 PROCEDURE — 86901 BLOOD TYPING SEROLOGIC RH(D): CPT | Performed by: PHYSICIAN ASSISTANT

## 2025-02-26 PROCEDURE — 96360 HYDRATION IV INFUSION INIT: CPT

## 2025-02-26 PROCEDURE — 96361 HYDRATE IV INFUSION ADD-ON: CPT

## 2025-02-26 PROCEDURE — 84702 CHORIONIC GONADOTROPIN TEST: CPT | Performed by: PHYSICIAN ASSISTANT

## 2025-02-26 PROCEDURE — 81001 URINALYSIS AUTO W/SCOPE: CPT | Performed by: PHYSICIAN ASSISTANT

## 2025-02-26 PROCEDURE — 2500000004 HC RX 250 GENERAL PHARMACY W/ HCPCS (ALT 636 FOR OP/ED): Performed by: PHYSICIAN ASSISTANT

## 2025-02-26 PROCEDURE — 83735 ASSAY OF MAGNESIUM: CPT | Performed by: PHYSICIAN ASSISTANT

## 2025-02-26 PROCEDURE — 76770 US EXAM ABDO BACK WALL COMP: CPT

## 2025-02-26 PROCEDURE — 80053 COMPREHEN METABOLIC PANEL: CPT | Performed by: PHYSICIAN ASSISTANT

## 2025-02-26 PROCEDURE — 76775 US EXAM ABDO BACK WALL LIM: CPT | Performed by: RADIOLOGY

## 2025-02-26 PROCEDURE — 99284 EMERGENCY DEPT VISIT MOD MDM: CPT | Mod: 25 | Performed by: STUDENT IN AN ORGANIZED HEALTH CARE EDUCATION/TRAINING PROGRAM

## 2025-02-26 PROCEDURE — 2500000001 HC RX 250 WO HCPCS SELF ADMINISTERED DRUGS (ALT 637 FOR MEDICARE OP): Performed by: PHYSICIAN ASSISTANT

## 2025-02-26 PROCEDURE — 76815 OB US LIMITED FETUS(S): CPT | Performed by: RADIOLOGY

## 2025-02-26 PROCEDURE — 87077 CULTURE AEROBIC IDENTIFY: CPT | Mod: TRILAB | Performed by: PHYSICIAN ASSISTANT

## 2025-02-26 PROCEDURE — 36415 COLL VENOUS BLD VENIPUNCTURE: CPT | Performed by: PHYSICIAN ASSISTANT

## 2025-02-26 RX ORDER — CEPHALEXIN 500 MG/1
500 CAPSULE ORAL 4 TIMES DAILY
Qty: 40 CAPSULE | Refills: 0 | Status: SHIPPED | OUTPATIENT
Start: 2025-02-26 | End: 2025-03-08

## 2025-02-26 RX ORDER — OXYCODONE HYDROCHLORIDE 5 MG/1
5 TABLET ORAL EVERY 6 HOURS PRN
Qty: 15 TABLET | Refills: 0 | Status: SHIPPED | OUTPATIENT
Start: 2025-02-26 | End: 2025-03-01

## 2025-02-26 RX ORDER — ACETAMINOPHEN 325 MG/1
975 TABLET ORAL ONCE
Status: COMPLETED | OUTPATIENT
Start: 2025-02-26 | End: 2025-02-26

## 2025-02-26 RX ADMIN — ACETAMINOPHEN 975 MG: 325 TABLET ORAL at 18:00

## 2025-02-26 RX ADMIN — SODIUM CHLORIDE 1000 ML: 900 INJECTION, SOLUTION INTRAVENOUS at 18:02

## 2025-02-26 ASSESSMENT — PAIN - FUNCTIONAL ASSESSMENT
PAIN_FUNCTIONAL_ASSESSMENT: 0-10
PAIN_FUNCTIONAL_ASSESSMENT: 0-10

## 2025-02-26 ASSESSMENT — COLUMBIA-SUICIDE SEVERITY RATING SCALE - C-SSRS
2. HAVE YOU ACTUALLY HAD ANY THOUGHTS OF KILLING YOURSELF?: NO
6. HAVE YOU EVER DONE ANYTHING, STARTED TO DO ANYTHING, OR PREPARED TO DO ANYTHING TO END YOUR LIFE?: NO
1. IN THE PAST MONTH, HAVE YOU WISHED YOU WERE DEAD OR WISHED YOU COULD GO TO SLEEP AND NOT WAKE UP?: NO

## 2025-02-26 ASSESSMENT — PAIN SCALES - GENERAL: PAINLEVEL_OUTOF10: 9

## 2025-02-26 NOTE — ED TRIAGE NOTES
Triage Note:  Date of Encounter: [unfilled]   MRN: [id number]    I saw the patient as the clinician in triage and performed a brief history and physical exam established acuity and order appropriate test to develop basic plan of care.  Patient will be seen by BECKY and/or the physician who will independently evaluate  The patient.  Please see subsequent provider notes for further details and disposition      Brief HPI:   In brief, the patient is a 34-year-old female coming in for left flank pain, has a known kidney stone, has a ureteral stent it has been in place for about a month, history complicated as she is 12 weeks pregnant as well.  Has left-sided abdominal pain, no vaginal discharge or bleeding    Focused physical exam:  Speaking clearly, up and ambulatory, no current distress, resting comfortably.    Plan/MDM:  Ultrasound kidneys, ultrasound pelvic and OB.  Labs, IV fluids        Please see subsequent provider note for details and disposition

## 2025-02-27 NOTE — DISCHARGE INSTRUCTIONS
Please follow-up with your urologist as scheduled.    Your ultrasound is very reassuring that the stent is working appropriately.  Your urine does have increased white cells so therefore I will treat it with antibiotics.  I do not think you need to stay in the hospital for IV antibiotics I think you can be treated with oral antibiotics.  If you have any other concerns, you can return to the emergency department for reevaluation.

## 2025-02-28 DIAGNOSIS — R11.0 NAUSEA: ICD-10-CM

## 2025-02-28 RX ORDER — ONDANSETRON 4 MG/1
4 TABLET, ORALLY DISINTEGRATING ORAL EVERY 8 HOURS PRN
Qty: 30 TABLET | Refills: 0 | Status: SHIPPED | OUTPATIENT
Start: 2025-02-28

## 2025-03-01 LAB — BACTERIA UR CULT: ABNORMAL

## 2025-03-02 ENCOUNTER — APPOINTMENT (OUTPATIENT)
Dept: RADIOLOGY | Facility: HOSPITAL | Age: 35
End: 2025-03-02
Payer: MEDICAID

## 2025-03-02 ENCOUNTER — HOSPITAL ENCOUNTER (EMERGENCY)
Facility: HOSPITAL | Age: 35
Discharge: HOME | End: 2025-03-02
Payer: MEDICAID

## 2025-03-02 VITALS
HEART RATE: 97 BPM | OXYGEN SATURATION: 100 % | BODY MASS INDEX: 35.88 KG/M2 | RESPIRATION RATE: 16 BRPM | WEIGHT: 195 LBS | SYSTOLIC BLOOD PRESSURE: 137 MMHG | DIASTOLIC BLOOD PRESSURE: 82 MMHG | HEIGHT: 62 IN | TEMPERATURE: 99.1 F

## 2025-03-02 DIAGNOSIS — O46.90 VAGINAL BLEEDING IN PREGNANCY (HHS-HCC): ICD-10-CM

## 2025-03-02 DIAGNOSIS — O44.00 PLACENTA PREVIA ANTEPARTUM (HHS-HCC): ICD-10-CM

## 2025-03-02 DIAGNOSIS — R31.9 URINARY TRACT INFECTION WITH HEMATURIA, SITE UNSPECIFIED: Primary | ICD-10-CM

## 2025-03-02 DIAGNOSIS — N39.0 URINARY TRACT INFECTION WITH HEMATURIA, SITE UNSPECIFIED: Primary | ICD-10-CM

## 2025-03-02 LAB
ABO GROUP (TYPE) IN BLOOD: NORMAL
ALBUMIN SERPL BCP-MCNC: 3.8 G/DL (ref 3.4–5)
ALP SERPL-CCNC: 111 U/L (ref 33–110)
ALT SERPL W P-5'-P-CCNC: 10 U/L (ref 7–45)
ANION GAP SERPL CALCULATED.3IONS-SCNC: 14 MMOL/L (ref 10–20)
APPEARANCE UR: CLEAR
AST SERPL W P-5'-P-CCNC: 10 U/L (ref 9–39)
B-HCG SERPL-ACNC: ABNORMAL MIU/ML
BASOPHILS # BLD AUTO: 0.02 X10*3/UL (ref 0–0.1)
BASOPHILS NFR BLD AUTO: 0.3 %
BILIRUB SERPL-MCNC: 0.3 MG/DL (ref 0–1.2)
BILIRUB UR STRIP.AUTO-MCNC: NEGATIVE MG/DL
BUN SERPL-MCNC: 12 MG/DL (ref 6–23)
CALCIUM SERPL-MCNC: 9.3 MG/DL (ref 8.6–10.3)
CHLORIDE SERPL-SCNC: 99 MMOL/L (ref 98–107)
CO2 SERPL-SCNC: 24 MMOL/L (ref 21–32)
COLOR UR: ABNORMAL
CREAT SERPL-MCNC: 0.81 MG/DL (ref 0.5–1.05)
EGFRCR SERPLBLD CKD-EPI 2021: >90 ML/MIN/1.73M*2
EOSINOPHIL # BLD AUTO: 0.04 X10*3/UL (ref 0–0.7)
EOSINOPHIL NFR BLD AUTO: 0.5 %
ERYTHROCYTE [DISTWIDTH] IN BLOOD BY AUTOMATED COUNT: 12.8 % (ref 11.5–14.5)
GLUCOSE SERPL-MCNC: 88 MG/DL (ref 74–99)
GLUCOSE UR STRIP.AUTO-MCNC: NORMAL MG/DL
HCT VFR BLD AUTO: 31.6 % (ref 36–46)
HGB BLD-MCNC: 10.8 G/DL (ref 12–16)
IMM GRANULOCYTES # BLD AUTO: 0.03 X10*3/UL (ref 0–0.7)
IMM GRANULOCYTES NFR BLD AUTO: 0.4 % (ref 0–0.9)
KETONES UR STRIP.AUTO-MCNC: ABNORMAL MG/DL
LEUKOCYTE ESTERASE UR QL STRIP.AUTO: ABNORMAL
LYMPHOCYTES # BLD AUTO: 2.04 X10*3/UL (ref 1.2–4.8)
LYMPHOCYTES NFR BLD AUTO: 26.9 %
MCH RBC QN AUTO: 29 PG (ref 26–34)
MCHC RBC AUTO-ENTMCNC: 34.2 G/DL (ref 32–36)
MCV RBC AUTO: 85 FL (ref 80–100)
MONOCYTES # BLD AUTO: 0.49 X10*3/UL (ref 0.1–1)
MONOCYTES NFR BLD AUTO: 6.5 %
MUCOUS THREADS #/AREA URNS AUTO: ABNORMAL /LPF
NEUTROPHILS # BLD AUTO: 4.97 X10*3/UL (ref 1.2–7.7)
NEUTROPHILS NFR BLD AUTO: 65.4 %
NITRITE UR QL STRIP.AUTO: NEGATIVE
NRBC BLD-RTO: 0 /100 WBCS (ref 0–0)
PH UR STRIP.AUTO: 7 [PH]
PLATELET # BLD AUTO: 364 X10*3/UL (ref 150–450)
POTASSIUM SERPL-SCNC: 3.7 MMOL/L (ref 3.5–5.3)
PROT SERPL-MCNC: 7.5 G/DL (ref 6.4–8.2)
PROT UR STRIP.AUTO-MCNC: NEGATIVE MG/DL
RBC # BLD AUTO: 3.72 X10*6/UL (ref 4–5.2)
RBC # UR STRIP.AUTO: ABNORMAL MG/DL
RBC #/AREA URNS AUTO: >20 /HPF
RH FACTOR (ANTIGEN D): NORMAL
SODIUM SERPL-SCNC: 133 MMOL/L (ref 136–145)
SP GR UR STRIP.AUTO: 1.01
SQUAMOUS #/AREA URNS AUTO: ABNORMAL /HPF
UROBILINOGEN UR STRIP.AUTO-MCNC: NORMAL MG/DL
WBC # BLD AUTO: 7.6 X10*3/UL (ref 4.4–11.3)
WBC #/AREA URNS AUTO: ABNORMAL /HPF

## 2025-03-02 PROCEDURE — 76801 OB US < 14 WKS SINGLE FETUS: CPT

## 2025-03-02 PROCEDURE — 81001 URINALYSIS AUTO W/SCOPE: CPT | Performed by: PHYSICIAN ASSISTANT

## 2025-03-02 PROCEDURE — 84295 ASSAY OF SERUM SODIUM: CPT | Performed by: PHYSICIAN ASSISTANT

## 2025-03-02 PROCEDURE — 85025 COMPLETE CBC W/AUTO DIFF WBC: CPT | Performed by: PHYSICIAN ASSISTANT

## 2025-03-02 PROCEDURE — 99284 EMERGENCY DEPT VISIT MOD MDM: CPT | Mod: 25

## 2025-03-02 PROCEDURE — 76801 OB US < 14 WKS SINGLE FETUS: CPT | Performed by: STUDENT IN AN ORGANIZED HEALTH CARE EDUCATION/TRAINING PROGRAM

## 2025-03-02 PROCEDURE — 36415 COLL VENOUS BLD VENIPUNCTURE: CPT | Performed by: PHYSICIAN ASSISTANT

## 2025-03-02 PROCEDURE — 76817 TRANSVAGINAL US OBSTETRIC: CPT | Performed by: STUDENT IN AN ORGANIZED HEALTH CARE EDUCATION/TRAINING PROGRAM

## 2025-03-02 PROCEDURE — 86901 BLOOD TYPING SEROLOGIC RH(D): CPT | Performed by: PHYSICIAN ASSISTANT

## 2025-03-02 PROCEDURE — 87086 URINE CULTURE/COLONY COUNT: CPT | Mod: WESLAB | Performed by: PHYSICIAN ASSISTANT

## 2025-03-02 PROCEDURE — 84702 CHORIONIC GONADOTROPIN TEST: CPT | Performed by: PHYSICIAN ASSISTANT

## 2025-03-02 ASSESSMENT — PAIN SCALES - GENERAL: PAINLEVEL_OUTOF10: 4

## 2025-03-02 ASSESSMENT — COLUMBIA-SUICIDE SEVERITY RATING SCALE - C-SSRS
1. IN THE PAST MONTH, HAVE YOU WISHED YOU WERE DEAD OR WISHED YOU COULD GO TO SLEEP AND NOT WAKE UP?: NO
2. HAVE YOU ACTUALLY HAD ANY THOUGHTS OF KILLING YOURSELF?: NO
6. HAVE YOU EVER DONE ANYTHING, STARTED TO DO ANYTHING, OR PREPARED TO DO ANYTHING TO END YOUR LIFE?: NO

## 2025-03-02 ASSESSMENT — PAIN DESCRIPTION - PAIN TYPE: TYPE: ACUTE PAIN

## 2025-03-02 ASSESSMENT — PAIN - FUNCTIONAL ASSESSMENT: PAIN_FUNCTIONAL_ASSESSMENT: 0-10

## 2025-03-02 ASSESSMENT — PAIN DESCRIPTION - LOCATION: LOCATION: ABDOMEN

## 2025-03-03 ENCOUNTER — TELEPHONE (OUTPATIENT)
Dept: PHARMACY | Facility: HOSPITAL | Age: 35
End: 2025-03-03
Payer: MEDICAID

## 2025-03-03 LAB — HOLD SPECIMEN: NORMAL

## 2025-03-03 NOTE — PROGRESS NOTES
"EDPD Note: Duration Adjust    Contacted Breanna Renetta \"Netta\" regarding positive urine culture/result that was taken during their recent emergency room visit. I completed education with patient. The patient is being treated with the proper medication; however, the duration of the discharge prescription is incorrect . I gave verbal education about the new medication duration found below. No further follow up needed from EDPD Team.     13-weeks pregnant pt presented in ED on 2/26 for left flank pain with history of kidney stone. She has had left ureteral stent for kidney stone. Pt was discharged with Keflex on 500 mg QID x 10 days. Antibiotic is susceptible. Pt stated that the left flank pain has been present since her stent was put in on 2/28. Pt presented back to ED on 3/2 for vaginal bleed, and was discharged with direction of continuing with keflex. Pt reporting to doing better and pain has been resolved. Advised pt may decrease duration is 7 days per  Guideline. Pt expressed understanding and will follow up with OBGYN soon.    Discharged with: Keflex 500 mg QID x 10 days  Drug: Keflex 500 mg  Sig: QID  Days Supply: 7    Susceptibility data from last 90 days.  Collected Specimen Info Organism Ampicillin Ciprofloxacin Levofloxacin Nitrofurantoin Penicillin Trimethoprim/Sulfamethoxazole Vancomycin   02/26/25 Urine from Clean Catch/Voided Enterococcus faecalis  S  I  S  S  S  R  S       If there are any other questions for the ED Post-Discharge Culture Follow Up Team, please contact [phone]. Fax: [phone].    Lucille Barry RPh      "

## 2025-03-03 NOTE — ED PROVIDER NOTES
HPI   Chief Complaint   Patient presents with    Vaginal Bleeding - Pregnant       34-year-old female presented emergency department with a chief complaint of vaginal bleeding.  She is currently 13 weeks pregnant.  She additionally has had left ureteral stent secondary to kidney stone.  She currently is diagnosed with urinary tract infection on cephalexin.  The bleeding was painless and bright red.  There is not significant clotting.  Patient denies chest pain, shortness of breath, numbness weakness.  Well-appearing nontoxic afebrile.  No other complaint.              Patient History   Past Medical History:   Diagnosis Date    Encounter for full-term uncomplicated delivery      (spontaneous vaginal delivery)    Encounter for supervision of normal pregnancy, unspecified, first trimester     Encounter for pregnancy related examination in first trimester    Encounter for supervision of normal pregnancy, unspecified, first trimester     Encounter for pregnancy related examination in first trimester    Other specified health status     No pertinent past medical history    Other specified health status 10/10/2019    No pertinent past surgical history    PCOS (polycystic ovarian syndrome)     Personal history of urinary calculi     History of kidney stones    Unspecified infection of urinary tract in pregnancy, second trimester [facility] 2019    UTI (urinary tract infection) in pregnancy in second trimester     Past Surgical History:   Procedure Laterality Date    OTHER SURGICAL HISTORY  2020    Oral surgery    OTHER SURGICAL HISTORY  06/10/2019    Repair of bladder    WISDOM TOOTH EXTRACTION       Family History   Problem Relation Name Age of Onset    Other (Cardiac Disorder) Mother      Hypothyroidism Mother      Breast cancer Mother      Uterine cancer Mother      Heart murmur Son      Other (LSVC (Persistent left superior vena cava)) Son      Other (LSVC (persistent left suprior vena cava)) Other Child       Social History     Tobacco Use    Smoking status: Never    Smokeless tobacco: Never   Substance Use Topics    Alcohol use: Never    Drug use: Never       Physical Exam   ED Triage Vitals [03/02/25 1838]   Temperature Heart Rate Respirations BP   37.3 °C (99.1 °F) 97 16 137/82      Pulse Ox Temp Source Heart Rate Source Patient Position   100 % Oral Monitor --      BP Location FiO2 (%)     -- --       Physical Exam  Vitals and nursing note reviewed.   Constitutional:       Appearance: Normal appearance.   HENT:      Head: Normocephalic.      Nose: Nose normal.      Mouth/Throat:      Mouth: Mucous membranes are moist.      Pharynx: Oropharynx is clear.   Cardiovascular:      Rate and Rhythm: Normal rate and regular rhythm.      Pulses: Normal pulses.      Heart sounds: Normal heart sounds.   Pulmonary:      Effort: Pulmonary effort is normal.      Breath sounds: Normal breath sounds.   Abdominal:      General: Abdomen is flat.   Musculoskeletal:         General: Normal range of motion.      Cervical back: Normal range of motion.   Skin:     General: Skin is warm.   Neurological:      General: No focal deficit present.      Mental Status: She is alert and oriented to person, place, and time.   Psychiatric:         Mood and Affect: Mood normal.           ED Course & MDM   Diagnoses as of 03/03/25 1136   Urinary tract infection with hematuria, site unspecified   Placenta previa antepartum ([facility])   Vaginal bleeding in pregnancy ([facility])                 No data recorded     Saniya Coma Scale Score: 15 (03/02/25 1836 : Mary Erickson RN)                           Medical Decision Making  I have seen and evaluated this patient.  Physician available for consultation.  Vital signs have been reviewed.  All laboratory and diagnostic imaging is reviewed by myself and interpreted by myself unless otherwise stated.  Additionally imaging is interpreted by radiologist.    CBC without significant leukocytosis or anemia,  metabolic panel without significant renal impairment or electrolyte abnormality.  Ultrasound with single live intrauterine pregnancy with evidence of placenta previa likely the source of patient's bleeding.  Patient still with urinary tract infection currently on cephalexin we will continue cephalexin.  Discharged with outpatient OB follow-up.  Released in stable condition from emergent standpoint.    Labs Reviewed  CBC WITH AUTO DIFFERENTIAL - Abnormal     WBC                           7.6                    nRBC                          0.0                    RBC                           3.72 (*)               Hemoglobin                    10.8 (*)               Hematocrit                    31.6 (*)               MCV                           85                     MCH                           29.0                   MCHC                          34.2                   RDW                           12.8                   Platelets                     364                    Neutrophils %                 65.4                   Immature Granulocytes %, Automated   0.4                    Lymphocytes %                 26.9                   Monocytes %                   6.5                    Eosinophils %                 0.5                    Basophils %                   0.3                    Neutrophils Absolute          4.97                   Immature Granulocytes Absolute, Au*   0.03                   Lymphocytes Absolute          2.04                   Monocytes Absolute            0.49                   Eosinophils Absolute          0.04                   Basophils Absolute            0.02                COMPREHENSIVE METABOLIC PANEL - Abnormal     Glucose                       88                     Sodium                        133 (*)                Potassium                     3.7                    Chloride                      99                     Bicarbonate                   24                      Anion Gap                     14                     Urea Nitrogen                 12                     Creatinine                    0.81                   eGFR                          >90                    Calcium                       9.3                    Albumin                       3.8                    Alkaline Phosphatase          111 (*)                Total Protein                 7.5                    AST                           10                     Bilirubin, Total              0.3                    ALT                           10                  HUMAN CHORIONIC GONADOTROPIN, SERUM QUANTITATIVE - Abnormal     HCG, Beta-Quantitative        31,772 (*)                 Narrative:  Total HCG measurement is performed using the [employer] Access                 Immunoassay which detects intact HCG and free beta HCG subunit.                  This test is not indicated for use as a tumor marker.                 HCG testing is performed using a different test methodology at [facility] than other [facility]. Direct result comparison                 should only be made within the same method.                    URINALYSIS WITH REFLEX CULTURE AND MICROSCOPIC - Abnormal     Color, Urine                                         Appearance, Urine             Clear                  Specific Gravity, Urine       1.014                  pH, Urine                     7.0                    Protein, Urine                NEGATIVE                Glucose, Urine                Normal                 Blood, Urine                  0.1 (1+) (*)               Ketones, Urine                10 (1+) (*)               Bilirubin, Urine              NEGATIVE                Urobilinogen, Urine           Normal                 Nitrite, Urine                NEGATIVE                Leukocyte Esterase, Urine     250 Carlos/uL (*)            MICROSCOPIC ONLY, URINE - Abnormal     WBC, Urine                     11-20 (*)               RBC, Urine                    >20 (*)                Squamous Epithelial Cells, Urine                          Mucus, Urine                  FEW                 URINE CULTURE  URINALYSIS WITH REFLEX CULTURE AND MICROSCOPIC       Narrative: The following orders were created for panel order Urinalysis with Reflex Culture and Microscopic.                Procedure                               Abnormality         Status                                   ---------                               -----------         ------                                   Urinalysis with Reflex C...[[id number]]  Abnormal            Final result                             Extra Urine Gray Tube[[id number]]                            Final result                                             Please view results for these tests on the individual orders.  ABORH     ABO TYPE                      O                      Rh TYPE                       POS                 EXTRA URINE GRAY TUBE  US PELVIS OB TRANSABDOMINAL W TRANSVAGINAL UP TO 1ST TRIMESTER   Final Result    Single live intrauterine gestation with mean gestational age of 13    weeks, 6 days +/-8 days.          Suggestion of at least partial placenta previa.          MACRO:    None          Signed by: Isatu Pierre 3/2/2025 8:40 PM    Dictation workstation:   [id number]     Medications - No data to display  Discharge Medication List as of 3/2/2025  9:31 PM                Procedure  Procedures     Jimmy Butcher PA-C  03/03/25 1137

## 2025-03-04 ENCOUNTER — APPOINTMENT (OUTPATIENT)
Dept: UROLOGY | Facility: CLINIC | Age: 35
End: 2025-03-04
Payer: MEDICAID

## 2025-03-04 DIAGNOSIS — N20.2 CALCULUS OF KIDNEY AND URETER: Primary | ICD-10-CM

## 2025-03-04 LAB — BACTERIA UR CULT: NORMAL

## 2025-03-04 PROCEDURE — 99212 OFFICE O/P EST SF 10 MIN: CPT | Performed by: SURGERY

## 2025-03-04 PROCEDURE — 1036F TOBACCO NON-USER: CPT | Performed by: SURGERY

## 2025-03-04 ASSESSMENT — PAIN SCALES - GENERAL: PAINLEVEL_OUTOF10: 0-NO PAIN

## 2025-03-04 NOTE — PROGRESS NOTES
"Subjective   Patient ID: Breanna Jackson \"Sheldon" is a 34 y.o. female who presents for Nephrolithiasis.    HPI  She is here for follow-up.  She is currently 14 weeks pregnant.  She had a left ureteral stent placed nearly 1 month ago for an obstructing stone.  Since then she has been doing reasonably well.  She did have 1 episode of cystitis last week, this was treated with antibiotics.  She also was in the ER with vaginal bleeding.  This resolved on its own.  She continues to follow with her OB.  She has minimal stent related discomfort.              Objective   Physical Exam  Well nourished  Breathing comfortably  Abdomen soft, ND, gravid, NT            Assessment/Plan   Problem List Items Addressed This Visit             ICD-10-CM       Genitourinary and Reproductive    Calculus of kidney and ureter - Primary N20.2        I reviewed her recent renal ultrasound.  The stent is in good position.  There is resolution of the previous hydronephrosis.  She is scheduled for stent exchange next month.  We will plan for a lithotripsy after she delivers her child.          Tania De Guzman MD 03/04/25 1:28 PM   "

## 2025-03-05 ENCOUNTER — ROUTINE PRENATAL (OUTPATIENT)
Dept: OBSTETRICS AND GYNECOLOGY | Facility: CLINIC | Age: 35
End: 2025-03-05
Payer: MEDICAID

## 2025-03-05 VITALS — BODY MASS INDEX: 33.84 KG/M2 | SYSTOLIC BLOOD PRESSURE: 120 MMHG | WEIGHT: 185 LBS | DIASTOLIC BLOOD PRESSURE: 78 MMHG

## 2025-03-05 DIAGNOSIS — Z3A.13 13 WEEKS GESTATION OF PREGNANCY (HHS-HCC): ICD-10-CM

## 2025-03-05 DIAGNOSIS — Z3A.15 15 WEEKS GESTATION OF PREGNANCY (HHS-HCC): Primary | ICD-10-CM

## 2025-03-05 PROCEDURE — 99213 OFFICE O/P EST LOW 20 MIN: CPT | Performed by: MIDWIFE

## 2025-03-05 NOTE — PROGRESS NOTES
"Subjective   Patient ID [id number]   Netta Jackson is a 34 y.o.  at 13w4d with a working estimated date of delivery of 2025, by Ultrasound who presents for a routine prenatal visit. She denies vaginal bleeding, leakage of fluid, decreased fetal movements, or contractions.    Her pregnancy is complicated by:  Prefers 'Netta'.  Obesity in pregnancy.  History of von Willebrand's.  Obtain fetal echo as history of abnormal cardiac ultrasound findings in previous pregnancies that resolved spontaneously.  Renal stone.  Stent placed left side.  Weekly NST 34 weeks.  Recommend baby aspirin daily    Objective   Physical Exam  Weight: 83.9 kg (185 lb)  Expected Total Weight Gain: Could not be calculated   Pregravid BMI: Could not be calculated  BP: 120/78  Fetal Heart Rate: 150      Prenatal Labs  Urine dip:  Lab Results   Component Value Date    KETONESU 10 (1+) (A) 2025       Lab Results   Component Value Date    HGB 10.8 (L) 2025    HCT 31.6 (L) 2025    ABO O 2025    HEPBSAG Nonreactive 2025     No results found for: \"PAPPA\", \"AFP\", \"HCG\", \"ESTRIOL\", \"INHBA\"  No results found for: \"GLUF\", \"GLUT1\", \"[id number]\", \"[id number]\"    Imaging  The most recent ultrasound was performed on 3/2/2025 with a study GA of   and EFW of  .          Assessment/Plan   Diagnoses and all orders for this visit:  15 weeks gestation of pregnancy ([facility])  -     AFP, Maternal Serum; Future  13 weeks gestation of pregnancy ([facility])  -     POCT UA (nonautomated) manually resulted      Continue prenatal vitamin.  Labs reviewed.  Pt was seen by urologist yesterday and has scheduled FU removal of urinary stent for 10mm renal calculus-- UA C&S not sent today.  1o lbs wt loss since last visit-- healthy diet in pregnancy reviewed.  Warning s/sx reviewed.  Follow up in 2 weeks for a routine prenatal visit.  "

## 2025-03-07 NOTE — ED PROVIDER NOTES
History of Present Illness     History provided by: Patient  Limitations to History: None  External Records Reviewed with Brief Summary:  Prior outpatient notes    HPI:  Breanna Jackson is a 34 y.o. female who presents with left abdominal pain.  Patient is currently 12 weeks pregnant and has noted left-sided flank pain radiating to her abdomen.  Patient has a history of a ureteral stent for kidney stones.  Notes associated nausea and vomiting.  No fevers or chills.    Physical Exam   Triage vitals:  T 37.7 °C (99.9 °F)  HR (!) 113  BP (!) 141/96  RR 16  O2 100 % None (Room air)    Physical Exam  Vitals and nursing note reviewed.   Constitutional:       General: She is not in acute distress.     Appearance: She is not ill-appearing.   HENT:      Head: Normocephalic and atraumatic.      Mouth/Throat:      Mouth: Mucous membranes are moist.      Pharynx: Oropharynx is clear.   Eyes:      Extraocular Movements: Extraocular movements intact.      Conjunctiva/sclera: Conjunctivae normal.      Pupils: Pupils are equal, round, and reactive to light.   Cardiovascular:      Rate and Rhythm: Regular rhythm. Tachycardia present.   Pulmonary:      Effort: Pulmonary effort is normal. No respiratory distress.      Breath sounds: Normal breath sounds.   Abdominal:      General: There is no distension.      Palpations: Abdomen is soft.      Tenderness: There is no abdominal tenderness. There is left CVA tenderness. There is no right CVA tenderness, guarding or rebound.   Musculoskeletal:         General: No swelling or deformity. Normal range of motion.      Cervical back: Normal range of motion and neck supple.   Skin:     General: Skin is warm and dry.      Capillary Refill: Capillary refill takes less than 2 seconds.   Neurological:      General: No focal deficit present.      Mental Status: She is alert and oriented to person, place, and time. Mental status is at baseline.   Psychiatric:         Mood and Affect: Mood normal.          Behavior: Behavior normal.           Medical Decision Making & ED Course   Medical Decision Makin y.o. female presents with flank pain.  Considered wide ddx including pyelonephritis, nephrolithiasis, acute cystitis, appendicitis, ovarian torsion, pregnancy/ectopic.   Blood noted on UA.  UA also noted to have evidence of a urinary tract infection with greater than 50 WBCs.  Renal function within normal limits.  Ultrasound obtained to evaluate patient's fetus as well as for renal function.  Hydronephrosis appears to be resolved which is encouraging that this does not as allowed for passage of patient's renal stone.  Patient treated with IVF and pain medication with improvement in pain.    Discussed with the patient our possibility of obtaining CT imaging to thoroughly evaluate her ureteral stent as well as the stone.  Given a reassuring ultrasound I do not feel strongly that any CT is necessary.  I also discussed the second option of being discharged home with pain medication to fully improve pain control at home.  At this time patient has elected to continue to follow-up outpatient with urology and to return home with pain medication.  Pain medication and antibiotics prescribed and patient discharged in stable condition with stable vital signs and tachycardia resolved.    ----      Social Determinants of Health which Significantly Impact Care: None identified     EKG Independent Interpretation: EKG not obtained    Independent Result Review and Interpretation: Relevant laboratory and radiographic results were reviewed and independently interpreted by myself.  As necessary, they are commented on in the ED Course.    Chronic conditions affecting the patient's care: As documented above in [facility]    The patient was discussed with the following consultants/services: None    Care Considerations: As documented above in [facility]    ED Course:  Diagnoses as of 25 0645   Acute flank pain   Acute cystitis with  hematuria       Procedures   Procedures    Jessica Khan MD  Emergency Medicine     Jessica Khan MD  03/07/25 0694

## 2025-03-11 ENCOUNTER — APPOINTMENT (OUTPATIENT)
Dept: UROLOGY | Facility: CLINIC | Age: 35
End: 2025-03-11
Payer: MEDICAID

## 2025-03-12 ENCOUNTER — APPOINTMENT (OUTPATIENT)
Dept: OBSTETRICS AND GYNECOLOGY | Facility: CLINIC | Age: 35
End: 2025-03-12
Payer: MEDICAID

## 2025-03-12 VITALS — WEIGHT: 187 LBS | BODY MASS INDEX: 34.2 KG/M2 | DIASTOLIC BLOOD PRESSURE: 76 MMHG | SYSTOLIC BLOOD PRESSURE: 118 MMHG

## 2025-03-12 DIAGNOSIS — Z32.01 PREGNANCY TEST POSITIVE (HHS-HCC): ICD-10-CM

## 2025-03-12 DIAGNOSIS — Z3A.14 14 WEEKS GESTATION OF PREGNANCY (HHS-HCC): ICD-10-CM

## 2025-03-12 DIAGNOSIS — O44.02 PLACENTA PREVIA IN SECOND TRIMESTER (HHS-HCC): ICD-10-CM

## 2025-03-12 DIAGNOSIS — Z3A.20 20 WEEKS GESTATION OF PREGNANCY (HHS-HCC): ICD-10-CM

## 2025-03-12 DIAGNOSIS — R82.998 LEUKOCYTES IN URINE: ICD-10-CM

## 2025-03-12 DIAGNOSIS — O23.42 URINARY TRACT INFECTION IN MOTHER DURING SECOND TRIMESTER OF PREGNANCY (HHS-HCC): Primary | ICD-10-CM

## 2025-03-12 DIAGNOSIS — R31.1 BENIGN ESSENTIAL MICROSCOPIC HEMATURIA: ICD-10-CM

## 2025-03-12 PROCEDURE — 81002 URINALYSIS NONAUTO W/O SCOPE: CPT | Performed by: OBSTETRICS & GYNECOLOGY

## 2025-03-12 PROCEDURE — 99214 OFFICE O/P EST MOD 30 MIN: CPT | Performed by: OBSTETRICS & GYNECOLOGY

## 2025-03-12 RX ORDER — NITROFURANTOIN 25; 75 MG/1; MG/1
100 CAPSULE ORAL 2 TIMES DAILY
Qty: 14 CAPSULE | Refills: 0 | Status: SHIPPED | OUTPATIENT
Start: 2025-03-12 | End: 2025-03-19

## 2025-03-12 NOTE — PROGRESS NOTES
"Subjective   Patient ID [id number]   eNtta Jackson is a 34 y.o.  at 14w4d with a working estimated date of delivery of 2025, by Ultrasound who presents for a routine prenatal visit. She denies vaginal bleeding, leakage of fluid, decreased fetal movements, or contractions.    Her pregnancy is complicated by:  Vaginal bleeding.  Recent ultrasound shows low-lying placenta/placenta previa.  Review the nature and the need for monitoring.  Stay on pelvic rest.  If the previa resolves can have a vaginal delivery.  If previa does not resolve would require .  Review importance of monitoring bleeding.  As long as she is not in labor some bleeding may be normal but typically not heavy.  She does have a stent in place.  Stent is to be removed .  Symptoms of a UTI.  Will prescribe Macrobid twice daily 7 days.  She is having a cough.  Recommend over-the-counter medications if not improving see her PCP    Objective   Physical Exam  Weight: 84.8 kg (187 lb)  Expected Total Weight Gain: Could not be calculated   Pregravid BMI: Could not be calculated  BP: 118/76  Fetal Heart Rate: 143      Prenatal Labs  Urine dip:  Lab Results   Component Value Date    KETONESU 10 (1+) (A) 2025       Lab Results   Component Value Date    HGB 10.8 (L) 2025    HCT 31.6 (L) 2025    ABO O 2025    HEPBSAG Nonreactive 2025     No results found for: \"PAPPA\", \"AFP\", \"HCG\", \"ESTRIOL\", \"INHBA\"  No results found for: \"GLUF\", \"GLUT1\", \"ZUFMIIT4AD\", \"TYBKUOS4DM\"    Imaging  The most recent ultrasound was performed on The most recent ultrasound study is not finalized with a study GA of The most recent ultrasound study is not finalized and EFW of The most recent ultrasound study is not finalized.  The most recent ultrasound study is not finalized  The most recent ultrasound study is not finalized    Assessment/Plan   Vaginal bleeding ultrasound shows placenta previa    Continue prenatal vitamin.  Labs " reviewed.  Rhogam not indicated.  Blood type is a positive  GTT between 24 to 28 weeks.  Macrobid for UTI.  Stent being removed April 9.  Follow up in 4 weeks for a routine prenatal visit.  She is taking iron for anemia and taking baby aspirin daily

## 2025-03-14 LAB — BACTERIA UR CULT: ABNORMAL

## 2025-03-18 ENCOUNTER — LAB (OUTPATIENT)
Dept: LAB | Facility: HOSPITAL | Age: 35
End: 2025-03-18
Payer: MEDICAID

## 2025-03-19 ENCOUNTER — LAB (OUTPATIENT)
Dept: LAB | Facility: HOSPITAL | Age: 35
End: 2025-03-19
Payer: MEDICAID

## 2025-03-19 DIAGNOSIS — Z34.82 ENCOUNTER FOR SUPERVISION OF OTHER NORMAL PREGNANCY, SECOND TRIMESTER: ICD-10-CM

## 2025-03-19 DIAGNOSIS — Z3A.15 15 WEEKS GESTATION OF PREGNANCY (HHS-HCC): Primary | ICD-10-CM

## 2025-03-19 LAB — HOLD SPECIMEN: NORMAL

## 2025-03-19 PROCEDURE — 82105 ALPHA-FETOPROTEIN SERUM: CPT

## 2025-03-22 LAB
AFP INTERP SERPL-IMP: NORMAL
AFP INTERP SERPL-IMP: NORMAL
AFP MOM SERPL: 0.99
AFP SERPL-MCNC: 27.5 NG/ML
AGE AT DELIVERY: 35.4 YR
COMMENT,AFP MATERNAL SERUM: NORMAL
GA METHOD: NORMAL
GA: 15.7 WEEKS
IDDM PATIENT QL: NO
MULTIPLE PREGNANCY: NO
NEURAL TUBE DEFECT RISK FETUS: NORMAL %
RESULTS, AFP MATERNAL SERUM: NORMAL

## 2025-03-25 ENCOUNTER — APPOINTMENT (OUTPATIENT)
Dept: PRIMARY CARE | Facility: CLINIC | Age: 35
End: 2025-03-25
Payer: MEDICAID

## 2025-03-25 VITALS
WEIGHT: 190 LBS | HEIGHT: 62 IN | DIASTOLIC BLOOD PRESSURE: 74 MMHG | BODY MASS INDEX: 34.96 KG/M2 | SYSTOLIC BLOOD PRESSURE: 126 MMHG

## 2025-03-25 DIAGNOSIS — R05.1 ACUTE COUGH: ICD-10-CM

## 2025-03-25 PROCEDURE — 99213 OFFICE O/P EST LOW 20 MIN: CPT | Performed by: INTERNAL MEDICINE

## 2025-03-25 PROCEDURE — 3008F BODY MASS INDEX DOCD: CPT | Performed by: INTERNAL MEDICINE

## 2025-03-25 PROCEDURE — 1036F TOBACCO NON-USER: CPT | Performed by: INTERNAL MEDICINE

## 2025-03-25 RX ORDER — ALBUTEROL SULFATE 90 UG/1
2 INHALANT RESPIRATORY (INHALATION)
Qty: 36 G | Refills: 1 | Status: SHIPPED | OUTPATIENT
Start: 2025-03-25

## 2025-03-25 NOTE — PROGRESS NOTES
"Subjective   Patient ID: Netta Jackson is a 34 y.o. female who presents for Sick Visit (cough).    HPI   Patient is pregnant and having this cough for past 1 month.  She has been on antibiotics for UTI.  She is thinking she is having UTI again and going for checkup tomorrow with OB/GYN  Her cough is worse when she lays down    Past recap  Patient is here for hospital follow-up.  She was hospitalized for pyelonephritis and obstructive uropathy.  She had a stent placed which needs to be replaced every 3 months as stone cannot be removed while she is pregnant.  She is doing fine with her pregnancy.  She has history of kidney stone in 2018 and had a stent placed.  She drinks plenty of water but she was taking vitamin D 50,000.  Her vitamin D levels has been improved first time in her life    Patient is here for follow-up on blood work  Follow-up: Vitamin D refills     patient is here for follow-up.  Did not do blood work.  Needs refill on controlled     patient is here for physical  Also would like birth control prescription until she can see the OB/GYN  She was recommended to do uterine ablation the for heavy menstrual cycle but patient is not willing to do it     past recap  Patient is here for presurgical clearance  She is going for right knee arthroscopic surgery  In August she stepped wrong and torn knee ACL     Past medical history: Ureteric stone, wisdom tooth removed, allergies  Social history:  non-smoker nondrinker 4 pregnancies 3 kids  Occupation: Stay home mom  Family history: Mother with von Willebrand's disease ovarian and breast cancer and endometriosis ,a fib, dont know fatherbrian  of MI young  Review of Systems    Objective   /74   Ht 1.575 m (5' 2\")   Wt 86.2 kg (190 lb)   LMP 2024   BMI 34.75 kg/m²     Physical Exam  Vitals reviewed.   Constitutional:       Appearance: Normal appearance.   HENT:      Head: Normocephalic and atraumatic.      Right Ear: Tympanic membrane, " ear canal and external ear normal.      Left Ear: Tympanic membrane, ear canal and external ear normal.      Nose: Nose normal.      Mouth/Throat:      Pharynx: Oropharynx is clear.   Eyes:      Extraocular Movements: Extraocular movements intact.      Conjunctiva/sclera: Conjunctivae normal.      Pupils: Pupils are equal, round, and reactive to light.   Cardiovascular:      Rate and Rhythm: Normal rate and regular rhythm.      Pulses: Normal pulses.      Heart sounds: Normal heart sounds.   Pulmonary:      Effort: Pulmonary effort is normal.      Breath sounds: Normal breath sounds.   Abdominal:      General: Abdomen is flat. Bowel sounds are normal.      Palpations: Abdomen is soft.   Musculoskeletal:      Cervical back: Normal range of motion and neck supple.   Skin:     General: Skin is warm and dry.   Neurological:      General: No focal deficit present.      Mental Status: She is alert and oriented to person, place, and time.   Psychiatric:         Mood and Affect: Mood normal.         Assessment/Plan   Problem List Items Addressed This Visit    None  Visit Diagnoses       Acute cough        Relevant Medications    albuterol 90 mcg/actuation inhaler        Past recap  physical normal  Routine blood work ordered  OB/GYN notes reviewed  Norethindrone 0.35 mg p.o. daily 3-month supply    1/5/2024  Blood work patient has not done yet blood work ordered  Vitamin D prescription based on results  Refills given on birth control    1/15/2024  Blood work reviewed  Vitamin D still low at  50,000 q. weekly for 1 year  Cholesterol has gone up  Discussed diet and exercise  Patient wants to try that before going on medication  Patient could be having issues with his family history making his cholesterol go up  With still high  Will consider statin  Follow-up blood work in 6 months    2/12/2025  UTI resolved  Repeat urine cultures recently done negative for infection  Advised patient to stop vitamin D supplements especially  high-dose vitamin D  Pain discussed lifestyle changes diet changes to help prevent kidney stone formation  Appreciated for drinking plenty of water avoid soft drinks and processed food    3/25/2025  Patient is examination is negative  She could be having first reactive airway disease or could be having acid reflux  Albuterol as needed  Advised to take Pepcid if okay with OB/GYN advised to avoid eating late  Take acid reflux precautions

## 2025-03-26 ENCOUNTER — APPOINTMENT (OUTPATIENT)
Dept: OBSTETRICS AND GYNECOLOGY | Facility: CLINIC | Age: 35
End: 2025-03-26
Payer: MEDICAID

## 2025-03-26 ENCOUNTER — PRE-ADMISSION TESTING (OUTPATIENT)
Dept: PREADMISSION TESTING | Facility: HOSPITAL | Age: 35
End: 2025-03-26
Payer: MEDICAID

## 2025-03-26 VITALS
BODY MASS INDEX: 35.19 KG/M2 | SYSTOLIC BLOOD PRESSURE: 112 MMHG | WEIGHT: 191.2 LBS | TEMPERATURE: 98.6 F | HEART RATE: 98 BPM | HEIGHT: 62 IN | OXYGEN SATURATION: 97 % | DIASTOLIC BLOOD PRESSURE: 82 MMHG

## 2025-03-26 VITALS — WEIGHT: 189 LBS | SYSTOLIC BLOOD PRESSURE: 116 MMHG | BODY MASS INDEX: 34.57 KG/M2 | DIASTOLIC BLOOD PRESSURE: 76 MMHG

## 2025-03-26 DIAGNOSIS — Z3A.16 16 WEEKS GESTATION OF PREGNANCY (HHS-HCC): ICD-10-CM

## 2025-03-26 DIAGNOSIS — R31.1 BENIGN ESSENTIAL MICROSCOPIC HEMATURIA: ICD-10-CM

## 2025-03-26 DIAGNOSIS — Z34.82 PRENATAL CARE, SUBSEQUENT PREGNANCY, SECOND TRIMESTER (HHS-HCC): ICD-10-CM

## 2025-03-26 PROCEDURE — 99204 OFFICE O/P NEW MOD 45 MIN: CPT

## 2025-03-26 PROCEDURE — 99213 OFFICE O/P EST LOW 20 MIN: CPT | Performed by: OBSTETRICS & GYNECOLOGY

## 2025-03-26 RX ORDER — NAPROXEN SODIUM 220 MG/1
81 TABLET, FILM COATED ORAL DAILY
COMMUNITY
End: 2025-04-09 | Stop reason: HOSPADM

## 2025-03-26 ASSESSMENT — DUKE ACTIVITY SCORE INDEX (DASI)
CAN YOU HAVE SEXUAL RELATIONS: YES
CAN YOU RUN A SHORT DISTANCE: YES
CAN YOU WALK INDOORS, SUCH AS AROUND YOUR HOUSE: YES
CAN YOU DO LIGHT WORK AROUND THE HOUSE LIKE DUSTING OR WASHING DISHES: YES
CAN YOU DO YARD WORK LIKE RAKING LEAVES, WEEDING OR PUSHING A MOWER: YES
DASI METS SCORE: 8
CAN YOU PARTICIPATE IN MODERATE RECREATIONAL ACTIVITIES LIKE GOLF, BOWLING, DANCING, DOUBLES TENNIS OR THROWING A BASEBALL OR FOOTBALL: YES
CAN YOU WALK A BLOCK OR TWO ON LEVEL GROUND: YES
CAN YOU DO HEAVY WORK AROUND THE HOUSE LIKE SCRUBBING FLOORS OR LIFTING AND MOVING HEAVY FURNITURE: NO
TOTAL_SCORE: 42.7
CAN YOU TAKE CARE OF YOURSELF (EAT, DRESS, BATHE, OR USE TOILET): YES
CAN YOU DO MODERATE WORK AROUND THE HOUSE LIKE VACUUMING, SWEEPING FLOORS OR CARRYING GROCERIES: YES
CAN YOU PARTICIPATE IN STRENOUS SPORTS LIKE SWIMMING, SINGLES TENNIS, FOOTBALL, BASKETBALL, OR SKIING: NO
CAN YOU CLIMB A FLIGHT OF STAIRS OR WALK UP A HILL: YES

## 2025-03-26 ASSESSMENT — ENCOUNTER SYMPTOMS
NAUSEA: 1
CARDIOVASCULAR NEGATIVE: 1
ALLERGIC/IMMUNOLOGIC NEGATIVE: 1
MUSCULOSKELETAL NEGATIVE: 1
CONSTITUTIONAL NEGATIVE: 1
PSYCHIATRIC NEGATIVE: 1
RESPIRATORY NEGATIVE: 1
ENDOCRINE NEGATIVE: 1
HEMATOLOGIC/LYMPHATIC NEGATIVE: 1
NEUROLOGICAL NEGATIVE: 1
EYES NEGATIVE: 1

## 2025-03-26 ASSESSMENT — PAIN - FUNCTIONAL ASSESSMENT: PAIN_FUNCTIONAL_ASSESSMENT: 0-10

## 2025-03-26 ASSESSMENT — PAIN SCALES - GENERAL: PAINLEVEL_OUTOF10: 0 - NO PAIN

## 2025-03-26 NOTE — PROGRESS NOTES
"Subjective   Patient ID [id number]   Netta Jackson is a 34 y.o.  at 16w4d with a working estimated date of delivery of 2025, by Ultrasound who presents for a routine prenatal visit. She denies vaginal bleeding, leakage of fluid, decreased fetal movements, or contractions.    Her pregnancy is complicated by:  Known stent.  Feeling like a UTI.  Will send urine for culture and sensitivity.  Negative MSAFP.  Schedule anatomy scan    Objective   Physical Exam  Weight: 85.7 kg (189 lb)  Expected Total Weight Gain: Could not be calculated   Pregravid BMI: Could not be calculated  BP: 116/76         Prenatal Labs  Urine dip:  Lab Results   Component Value Date    KETONESU 10 (1+) (A) 2025       Lab Results   Component Value Date    HGB 10.8 (L) 2025    HCT 31.6 (L) 2025    ABO O 2025    HEPBSAG Nonreactive 2025     No results found for: \"PAPPA\", \"AFP\", \"HCG\", \"ESTRIOL\", \"INHBA\"  No results found for: \"GLUF\", \"GLUT1\", \"KVFNOZV9KX\", \"BCRBTGS8JA\"    Imaging  The most recent ultrasound was performed on The most recent ultrasound study is not finalized with a study GA of The most recent ultrasound study is not finalized and EFW of The most recent ultrasound study is not finalized.  The most recent ultrasound study is not finalized  The most recent ultrasound study is not finalized    Assessment/Plan   Send urine for culture and sensitivity    Continue prenatal vitamin.  Labs reviewed.  Rhogam not indicated  GTT 24 to 28 weeks.  Anatomy scan.  Send urine for culture and sensitivity  Follow up in 4 weeks for a routine prenatal visit.  "

## 2025-03-26 NOTE — PREPROCEDURE INSTRUCTIONS
Medication List            Accurate as of March 26, 2025  8:19 AM. Always use your most recent med list.                albuterol 90 mcg/actuation inhaler  Inhale 2 puffs 4 times a day.  Medication Adjustments for Surgery: Take/Use as prescribed     aspirin 81 mg chewable tablet  Additional Medication Adjustments for Surgery: Take last dose 7 days before surgery     ondansetron ODT 4 mg disintegrating tablet  Commonly known as: Zofran-ODT  Dissolve 1 tablet (4 mg) in the mouth every 8 hours if needed for nausea or vomiting.  Medication Adjustments for Surgery: Take/Use as prescribed     PRENATAL 19 ORAL  Medication Adjustments for Surgery: Take/Use as prescribed                  Preoperative Fasting Guidelines    Why must I stop eating and drinking near surgery time?  With sedation, food or liquid in your stomach can enter your lungs causing serious complications  Increases nausea and vomiting    When do I need to stop eating and drinking before my surgery?  Do not eat any food after midnight the night before your surgery/procedure.  You may have up to 13.5 ounces of clear liquid until TWO hours before your instructed arrival time to the hospital.  This includes water, black tea/coffee, (no milk or cream) apple juice, and electrolyte drinks (Gatorade)  You may chew gum until TWO hours before your surgery/procedure    PAT DISCHARGE INSTRUCTIONS    Please call the Same Day Surgery (SDS) Department of the hospital where your procedure will be performed after 2:00 PM the day before your surgery. If you are scheduled on a Monday, or a Tuesday following a Monday holiday, you will need to call on the last business day prior to your surgery.    [facility]  [address] [place] [address] [phone]  [address], [address] [phone]  [facility]  [place]  [address] [place] [address]  [phone]    Please let your surgeon know if:      You develop any open sores, shingles, burning or painful urination as these may increase your risk of an infection.   You no longer wish to have the surgery.   Any other personal circumstances change that may lead to the need to cancel or defer this surgery-such as being sick or getting admitted to any hospital within one week of your planned procedure.    Your contact details change, such as a change of address or phone number.    Starting now:     Please DO NOT drink alcohol or smoke for 24 hours before surgery. It is well known that quitting smoking can make a huge difference to your health and recovery from surgery. The longer you abstain from smoking, the better your chances of a healthy recovery. If you need help with quitting, call 2-800QUIT-NOW to be connected to a trained counselor who will discuss the best methods to help you quit.     Before your surgery:    Please stop all supplements 7 days prior to surgery. Or as directed by your surgeon.   Please stop taking NSAID pain medicine such as Advil and Motrin 7 days before surgery.    If you develop any fever, cough, cold, rashes, cuts, scratches, scrapes, urinary symptoms or infection anywhere on your body (including teeth and gums) prior to surgery, please call your surgeon’s office as soon as possible. This may require treatment to reduce the chance of cancellation on the day of surgery.    The day before your surgery:   DIET- Please follow the diet instructions at the top of your packet.   Get a good night’s rest.  Use the special soap for bathing if you have been instructed to use one.    Scheduled surgery times may change and you will be notified if this occurs - please check your personal voicemail for any updates.     On the morning of surgery:   Wear comfortable, loose fitting clothes which open in the front. Please do not wear moisturizers,  creams, lotions, makeup or perfume.    Please bring with you to surgery:   Photo ID and insurance card   Current list of medicines and allergies   Pacemaker/ Defibrillator/Heart stent cards   CPAP machine and mask    Slings/ splints/ crutches   A copy of your complete advanced directive/DHPOA.    Please do NOT bring with you to surgery:   All jewelry and valuables should be left at home.   Prosthetic devices such as contact lenses, hearing aids, dentures, eyelash extensions, hairpins and body piercings must be removed prior to going in to the surgical suite.    After outpatient surgery:   A responsible adult MUST accompany you at the time of discharge and stay with you for 24 hours after your surgery. You may NOT drive yourself home after surgery.    Do not drive, operate machinery, make critical decisions or do activities that require co-ordination or balance until after a night’s sleep.   Do not drink alcoholic beverages for 24 hours.   Instructions for resuming your medications will be provided by your surgeon.    CALL YOUR DOCTOR AFTER SURGERY IF YOU HAVE:     Chills and/or a fever of 101° F or higher.    Redness, swelling, pus or drainage from your surgical wound or a bad smell from the wound.    Lightheadedness, fainting or confusion.    Persistent vomiting (throwing up) and are not able to eat or drink for 12 hours.    Three or more loose, watery bowel movements in 24 hours (diarrhea).   Difficulty or pain while urinating( after non-urological surgery)    Pain and swelling in your legs, especially if it is only on one side.    Difficulty breathing or are breathing faster than normal.    Any new concerning symptoms.

## 2025-03-26 NOTE — H&P (VIEW-ONLY)
"CPM/PAT Evaluation       Name: Breanna Jackson (Breanna Jackson \"Netta\")  /Age: 1990/34 y.o.     In-Person       Chief Complaint: Calculus of kidney and ureter     HPI: Breanna Jackson is a 34 year old pregnant 16w4d female. She has a history of kidney stones. She was seen in the ER 25 with left flank pain. US of the kidneys showed:  FINDINGS:  RIGHT KIDNEY:  The right kidney measures 10.9 cm in length. The renal cortical  echogenicity and thickness are within normal limits. No  hydronephrosis. A few echogenic nephroliths may be present measuring  up to 8 mm. The adjacent liver is echogenic suggestive of steatosis.      LEFT KIDNEY:  The left kidney measures 12.1 cm in length. The renal cortical  echogenicity and thickness are within normal limits. Mild  hydronephrosis. 5 mm nephrolith is suspected..      BLADDER:  Partially distended. Bilateral ureteral jets were observed.    She had a cystoscopy with ureteral stent insertion 25. She states today she has no flank pain or abdominal pain. She denies pain with urination or blood in the urine. She states she is not having the stone removed until after she delivers. She is scheduled for a cystoscopy with a left ureteral stent removal and reinsertion . She denies fever, chills, chest pain, sob, dizziness,and palpitations.     Past Medical History:   Diagnosis Date    Encounter for full-term uncomplicated delivery      (spontaneous vaginal delivery)    Encounter for supervision of normal pregnancy, unspecified, first trimester     Encounter for pregnancy related examination in first trimester    Encounter for supervision of normal pregnancy, unspecified, first trimester     Encounter for pregnancy related examination in first trimester    Other specified health status     No pertinent past medical history    Other specified health status 10/10/2019    No pertinent past surgical history    PCOS (polycystic ovarian syndrome)     Personal history of urinary " calculi     History of kidney stones    Unspecified infection of urinary tract in pregnancy, second trimester ([facility]) 09/26/2019    UTI (urinary tract infection) in pregnancy in second trimester       Past Surgical History:   Procedure Laterality Date    OTHER SURGICAL HISTORY  07/23/2020    Oral surgery    OTHER SURGICAL HISTORY  06/10/2019    Repair of bladder    WISDOM TOOTH EXTRACTION       Social History     Tobacco Use    Smoking status: Never    Smokeless tobacco: Never   Substance Use Topics    Alcohol use: Never     Social History     Substance and Sexual Activity   Drug Use Never         Family History   Problem Relation Name Age of Onset    Other (Cardiac Disorder) Mother      Hypothyroidism Mother      Breast cancer Mother      Uterine cancer Mother      Heart murmur Son      Other (LSVC (Persistent left superior vena cava)) Son      Other (LSVC (persistent left suprior vena cava)) Other Child        Allergies   Allergen Reactions    Amoxicillin Anaphylaxis and Hives    Lactose Diarrhea    Penicillins Anaphylaxis and Hives    Prednisone Anaphylaxis and Hives    Raspberry Anaphylaxis    Wheat Hives    Latex Hives     Current Outpatient Medications   Medication Sig Dispense Refill    aspirin 81 mg chewable tablet Chew 1 tablet (81 mg) once daily.      ondansetron ODT (Zofran-ODT) 4 mg disintegrating tablet Dissolve 1 tablet (4 mg) in the mouth every 8 hours if needed for nausea or vomiting. 30 tablet 0    prenatal no115/iron/folic acid (PRENATAL 19 ORAL) Take by mouth.      albuterol 90 mcg/actuation inhaler Inhale 2 puffs 4 times a day. (Patient not taking: Reported on 3/26/2025) 36 g 1     No current facility-administered medications for this visit.       Review of Systems   Constitutional: Negative.    HENT: Negative.     Eyes: Negative.    Respiratory: Negative.     Cardiovascular: Negative.    Gastrointestinal:  Positive for nausea (pregnancy).   Endocrine: Negative.    Genitourinary: Negative.   "  Musculoskeletal: Negative.    Skin: Negative.    Allergic/Immunologic: Negative.    Neurological: Negative.    Hematological: Negative.    Psychiatric/Behavioral: Negative.                Physical Exam  Vitals reviewed.   Constitutional:       Appearance: Normal appearance.   HENT:      Head: Normocephalic and atraumatic.      Nose: Nose normal.      Mouth/Throat:      Mouth: Mucous membranes are moist.      Pharynx: Oropharynx is clear.   Eyes:      Extraocular Movements: Extraocular movements intact.      Conjunctiva/sclera: Conjunctivae normal.   Cardiovascular:      Rate and Rhythm: Normal rate and regular rhythm.      Pulses: Normal pulses.      Heart sounds: Normal heart sounds.   Pulmonary:      Effort: Pulmonary effort is normal.      Breath sounds: Normal breath sounds.   Abdominal:      General: Bowel sounds are normal.      Palpations: Abdomen is soft.   Genitourinary:     Comments: Assessment deferred to physician  Musculoskeletal:         General: Normal range of motion.      Cervical back: Normal range of motion and neck supple.   Skin:     General: Skin is warm and dry.   Neurological:      General: No focal deficit present.      Mental Status: She is alert and oriented to person, place, and time.   Psychiatric:         Mood and Affect: Mood normal.         Behavior: Behavior normal.         Thought Content: Thought content normal.         Judgment: Judgment normal.          PAT AIRWAY:   Airway:     Mallampati::  III    TM distance::  >3 FB    Neck ROM::  Full  normal          Visit Vitals  /82   Pulse 98   Temp 37 °C (98.6 °F) (Temporal)   Ht 1.575 m (5' 2\")   Wt 86.7 kg (191 lb 3.2 oz)   LMP 2024   SpO2 97%   BMI 34.97 kg/m²   OB Status Pregnant   Smoking Status Never   BSA 1.95 m²     ASA: II  CHADS2: 1.9%  RCRI: 0.4%  DASI:42.7  METS:8  STOP BAN        Assessment and Plan:     Calculus of kidney and ureter : Cystoscopy with Ureteral Stent Removal, Insertion   Pregnant: 16w4d " expected delivery date 9/6/25, aspirin, prenatal vitamin,  zofran, placenta previa  Asthma: patient states she has a rescue inhaler that she may use in the summer and fall  PCOS    LABS: 3/2/25 reviewed  SOUMYA Leiva-CNP

## 2025-03-26 NOTE — CPM/PAT H&P
"CPM/PAT Evaluation       Name: Breanna Jackson (Breanna Jackson \"Netta\")  /Age: 1990/34 y.o.     In-Person       Chief Complaint: Calculus of kidney and ureter     HPI: Breanna Jackson is a 34 year old pregnant 16w4d female. She has a history of kidney stones. She was seen in the ER 25 with left flank pain. US of the kidneys showed:  FINDINGS:  RIGHT KIDNEY:  The right kidney measures 10.9 cm in length. The renal cortical  echogenicity and thickness are within normal limits. No  hydronephrosis. A few echogenic nephroliths may be present measuring  up to 8 mm. The adjacent liver is echogenic suggestive of steatosis.      LEFT KIDNEY:  The left kidney measures 12.1 cm in length. The renal cortical  echogenicity and thickness are within normal limits. Mild  hydronephrosis. 5 mm nephrolith is suspected..      BLADDER:  Partially distended. Bilateral ureteral jets were observed.    She had a cystoscopy with ureteral stent insertion 25. She states today she has no flank pain or abdominal pain. She denies pain with urination or blood in the urine. She states she is not having the stone removed until after she delivers. She is scheduled for a cystoscopy with a left ureteral stent removal and reinsertion . She denies fever, chills, chest pain, sob, dizziness,and palpitations.     Past Medical History:   Diagnosis Date    Encounter for full-term uncomplicated delivery      (spontaneous vaginal delivery)    Encounter for supervision of normal pregnancy, unspecified, first trimester     Encounter for pregnancy related examination in first trimester    Encounter for supervision of normal pregnancy, unspecified, first trimester     Encounter for pregnancy related examination in first trimester    Other specified health status     No pertinent past medical history    Other specified health status 10/10/2019    No pertinent past surgical history    PCOS (polycystic ovarian syndrome)     Personal history of urinary " calculi     History of kidney stones    Unspecified infection of urinary tract in pregnancy, second trimester ([facility]) 09/26/2019    UTI (urinary tract infection) in pregnancy in second trimester       Past Surgical History:   Procedure Laterality Date    OTHER SURGICAL HISTORY  07/23/2020    Oral surgery    OTHER SURGICAL HISTORY  06/10/2019    Repair of bladder    WISDOM TOOTH EXTRACTION       Social History     Tobacco Use    Smoking status: Never    Smokeless tobacco: Never   Substance Use Topics    Alcohol use: Never     Social History     Substance and Sexual Activity   Drug Use Never         Family History   Problem Relation Name Age of Onset    Other (Cardiac Disorder) Mother      Hypothyroidism Mother      Breast cancer Mother      Uterine cancer Mother      Heart murmur Son      Other (LSVC (Persistent left superior vena cava)) Son      Other (LSVC (persistent left suprior vena cava)) Other Child        Allergies   Allergen Reactions    Amoxicillin Anaphylaxis and Hives    Lactose Diarrhea    Penicillins Anaphylaxis and Hives    Prednisone Anaphylaxis and Hives    Raspberry Anaphylaxis    Wheat Hives    Latex Hives     Current Outpatient Medications   Medication Sig Dispense Refill    aspirin 81 mg chewable tablet Chew 1 tablet (81 mg) once daily.      ondansetron ODT (Zofran-ODT) 4 mg disintegrating tablet Dissolve 1 tablet (4 mg) in the mouth every 8 hours if needed for nausea or vomiting. 30 tablet 0    prenatal no115/iron/folic acid (PRENATAL 19 ORAL) Take by mouth.      albuterol 90 mcg/actuation inhaler Inhale 2 puffs 4 times a day. (Patient not taking: Reported on 3/26/2025) 36 g 1     No current facility-administered medications for this visit.       Review of Systems   Constitutional: Negative.    HENT: Negative.     Eyes: Negative.    Respiratory: Negative.     Cardiovascular: Negative.    Gastrointestinal:  Positive for nausea (pregnancy).   Endocrine: Negative.    Genitourinary: Negative.   "  Musculoskeletal: Negative.    Skin: Negative.    Allergic/Immunologic: Negative.    Neurological: Negative.    Hematological: Negative.    Psychiatric/Behavioral: Negative.                Physical Exam  Vitals reviewed.   Constitutional:       Appearance: Normal appearance.   HENT:      Head: Normocephalic and atraumatic.      Nose: Nose normal.      Mouth/Throat:      Mouth: Mucous membranes are moist.      Pharynx: Oropharynx is clear.   Eyes:      Extraocular Movements: Extraocular movements intact.      Conjunctiva/sclera: Conjunctivae normal.   Cardiovascular:      Rate and Rhythm: Normal rate and regular rhythm.      Pulses: Normal pulses.      Heart sounds: Normal heart sounds.   Pulmonary:      Effort: Pulmonary effort is normal.      Breath sounds: Normal breath sounds.   Abdominal:      General: Bowel sounds are normal.      Palpations: Abdomen is soft.   Genitourinary:     Comments: Assessment deferred to physician  Musculoskeletal:         General: Normal range of motion.      Cervical back: Normal range of motion and neck supple.   Skin:     General: Skin is warm and dry.   Neurological:      General: No focal deficit present.      Mental Status: She is alert and oriented to person, place, and time.   Psychiatric:         Mood and Affect: Mood normal.         Behavior: Behavior normal.         Thought Content: Thought content normal.         Judgment: Judgment normal.          PAT AIRWAY:   Airway:     Mallampati::  III    TM distance::  >3 FB    Neck ROM::  Full  normal          Visit Vitals  /82   Pulse 98   Temp 37 °C (98.6 °F) (Temporal)   Ht 1.575 m (5' 2\")   Wt 86.7 kg (191 lb 3.2 oz)   LMP 2024   SpO2 97%   BMI 34.97 kg/m²   OB Status Pregnant   Smoking Status Never   BSA 1.95 m²     ASA: II  CHADS2: 1.9%  RCRI: 0.4%  DASI:42.7  METS:8  STOP BAN        Assessment and Plan:     Calculus of kidney and ureter : Cystoscopy with Ureteral Stent Removal, Insertion   Pregnant: 16w4d " expected delivery date 9/6/25, aspirin, prenatal vitamin,  zofran, placenta previa  Asthma: patient states she has a rescue inhaler that she may use in the summer and fall  PCOS    LABS: 3/2/25 reviewed  SOUMYA Leiva-CNP

## 2025-03-29 LAB — BACTERIA UR CULT: ABNORMAL

## 2025-03-30 ENCOUNTER — PATIENT OUTREACH (OUTPATIENT)
Dept: OBSTETRICS AND GYNECOLOGY | Facility: HOSPITAL | Age: 35
End: 2025-03-30
Payer: MEDICAID

## 2025-03-30 DIAGNOSIS — O23.42 URINARY TRACT INFECTION IN MOTHER DURING SECOND TRIMESTER OF PREGNANCY (HHS-HCC): Primary | ICD-10-CM

## 2025-03-30 RX ORDER — NITROFURANTOIN 25; 75 MG/1; MG/1
100 CAPSULE ORAL 2 TIMES DAILY
Qty: 14 CAPSULE | Refills: 0 | Status: SHIPPED | OUTPATIENT
Start: 2025-03-30 | End: 2025-04-06

## 2025-04-05 DIAGNOSIS — R11.0 NAUSEA: ICD-10-CM

## 2025-04-06 RX ORDER — ONDANSETRON 4 MG/1
TABLET, ORALLY DISINTEGRATING ORAL
Qty: 30 TABLET | Refills: 0 | Status: SHIPPED | OUTPATIENT
Start: 2025-04-06

## 2025-04-08 RX ORDER — CEFAZOLIN SODIUM 2 G/100ML
2 INJECTION, SOLUTION INTRAVENOUS ONCE
Status: CANCELLED | OUTPATIENT
Start: 2025-04-08

## 2025-04-09 ENCOUNTER — ANESTHESIA (OUTPATIENT)
Dept: OPERATING ROOM | Facility: HOSPITAL | Age: 35
End: 2025-04-09
Payer: MEDICAID

## 2025-04-09 ENCOUNTER — HOSPITAL ENCOUNTER (OUTPATIENT)
Facility: HOSPITAL | Age: 35
Setting detail: OUTPATIENT SURGERY
Discharge: HOME | End: 2025-04-09
Attending: SURGERY | Admitting: SURGERY
Payer: MEDICAID

## 2025-04-09 ENCOUNTER — APPOINTMENT (OUTPATIENT)
Dept: RADIOLOGY | Facility: HOSPITAL | Age: 35
End: 2025-04-09
Payer: MEDICAID

## 2025-04-09 ENCOUNTER — ANESTHESIA EVENT (OUTPATIENT)
Dept: OPERATING ROOM | Facility: HOSPITAL | Age: 35
End: 2025-04-09
Payer: MEDICAID

## 2025-04-09 VITALS
SYSTOLIC BLOOD PRESSURE: 139 MMHG | RESPIRATION RATE: 20 BRPM | WEIGHT: 191.14 LBS | OXYGEN SATURATION: 98 % | HEART RATE: 74 BPM | BODY MASS INDEX: 34.96 KG/M2 | DIASTOLIC BLOOD PRESSURE: 69 MMHG | TEMPERATURE: 97.3 F

## 2025-04-09 DIAGNOSIS — N20.2 CALCULUS OF KIDNEY AND URETER: Primary | ICD-10-CM

## 2025-04-09 LAB
ANION GAP SERPL CALCULATED.3IONS-SCNC: 12 MMOL/L (ref 10–20)
BUN SERPL-MCNC: 9 MG/DL (ref 6–23)
CALCIUM SERPL-MCNC: 9.2 MG/DL (ref 8.6–10.3)
CHLORIDE SERPL-SCNC: 101 MMOL/L (ref 98–107)
CO2 SERPL-SCNC: 25 MMOL/L (ref 21–32)
CREAT SERPL-MCNC: 0.98 MG/DL (ref 0.5–1.05)
EGFRCR SERPLBLD CKD-EPI 2021: 78 ML/MIN/1.73M*2
GLUCOSE SERPL-MCNC: 103 MG/DL (ref 74–99)
POTASSIUM SERPL-SCNC: 3.7 MMOL/L (ref 3.5–5.3)
SODIUM SERPL-SCNC: 134 MMOL/L (ref 136–145)

## 2025-04-09 PROCEDURE — 2500000004 HC RX 250 GENERAL PHARMACY W/ HCPCS (ALT 636 FOR OP/ED): Performed by: SURGERY

## 2025-04-09 PROCEDURE — 36415 COLL VENOUS BLD VENIPUNCTURE: CPT | Performed by: ANESTHESIOLOGY

## 2025-04-09 PROCEDURE — C1769 GUIDE WIRE: HCPCS | Performed by: SURGERY

## 2025-04-09 PROCEDURE — 3700000002 HC GENERAL ANESTHESIA TIME - EACH INCREMENTAL 1 MINUTE: Performed by: SURGERY

## 2025-04-09 PROCEDURE — A52332 PR CYSTOSCOPY,INSERT URETERAL STENT: Performed by: ANESTHESIOLOGY

## 2025-04-09 PROCEDURE — C2617 STENT, NON-COR, TEM W/O DEL: HCPCS | Performed by: SURGERY

## 2025-04-09 PROCEDURE — 7100000002 HC RECOVERY ROOM TIME - EACH INCREMENTAL 1 MINUTE: Performed by: SURGERY

## 2025-04-09 PROCEDURE — 82374 ASSAY BLOOD CARBON DIOXIDE: CPT | Performed by: ANESTHESIOLOGY

## 2025-04-09 PROCEDURE — 2720000007 HC OR 272 NO HCPCS: Performed by: SURGERY

## 2025-04-09 PROCEDURE — A52332 PR CYSTOSCOPY,INSERT URETERAL STENT: Performed by: NURSE ANESTHETIST, CERTIFIED REGISTERED

## 2025-04-09 PROCEDURE — 3700000001 HC GENERAL ANESTHESIA TIME - INITIAL BASE CHARGE: Performed by: SURGERY

## 2025-04-09 PROCEDURE — 2780000003 HC OR 278 NO HCPCS: Performed by: SURGERY

## 2025-04-09 PROCEDURE — 7100000010 HC PHASE TWO TIME - EACH INCREMENTAL 1 MINUTE: Performed by: SURGERY

## 2025-04-09 PROCEDURE — 7100000009 HC PHASE TWO TIME - INITIAL BASE CHARGE: Performed by: SURGERY

## 2025-04-09 PROCEDURE — 3600000008 HC OR TIME - EACH INCREMENTAL 1 MINUTE - PROCEDURE LEVEL THREE: Performed by: SURGERY

## 2025-04-09 PROCEDURE — 76942 ECHO GUIDE FOR BIOPSY: CPT

## 2025-04-09 PROCEDURE — 2500000004 HC RX 250 GENERAL PHARMACY W/ HCPCS (ALT 636 FOR OP/ED): Performed by: NURSE ANESTHETIST, CERTIFIED REGISTERED

## 2025-04-09 PROCEDURE — 52332 CYSTOSCOPY AND TREATMENT: CPT | Performed by: SURGERY

## 2025-04-09 PROCEDURE — 3600000003 HC OR TIME - INITIAL BASE CHARGE - PROCEDURE LEVEL THREE: Performed by: SURGERY

## 2025-04-09 PROCEDURE — 7100000001 HC RECOVERY ROOM TIME - INITIAL BASE CHARGE: Performed by: SURGERY

## 2025-04-09 RX ORDER — MIDAZOLAM HYDROCHLORIDE 1 MG/ML
1 INJECTION, SOLUTION INTRAMUSCULAR; INTRAVENOUS ONCE AS NEEDED
Status: DISCONTINUED | OUTPATIENT
Start: 2025-04-09 | End: 2025-04-09 | Stop reason: HOSPADM

## 2025-04-09 RX ORDER — MEPERIDINE HYDROCHLORIDE 25 MG/ML
12.5 INJECTION INTRAMUSCULAR; INTRAVENOUS; SUBCUTANEOUS EVERY 10 MIN PRN
Status: DISCONTINUED | OUTPATIENT
Start: 2025-04-09 | End: 2025-04-09 | Stop reason: HOSPADM

## 2025-04-09 RX ORDER — HYDROMORPHONE HYDROCHLORIDE 0.2 MG/ML
0.2 INJECTION INTRAMUSCULAR; INTRAVENOUS; SUBCUTANEOUS EVERY 5 MIN PRN
Status: DISCONTINUED | OUTPATIENT
Start: 2025-04-09 | End: 2025-04-09 | Stop reason: HOSPADM

## 2025-04-09 RX ORDER — CEFAZOLIN SODIUM 2 G/100ML
INJECTION, SOLUTION INTRAVENOUS AS NEEDED
Status: DISCONTINUED | OUTPATIENT
Start: 2025-04-09 | End: 2025-04-09

## 2025-04-09 RX ORDER — ONDANSETRON HYDROCHLORIDE 2 MG/ML
4 INJECTION, SOLUTION INTRAVENOUS ONCE AS NEEDED
Status: DISCONTINUED | OUTPATIENT
Start: 2025-04-09 | End: 2025-04-09 | Stop reason: HOSPADM

## 2025-04-09 RX ORDER — SODIUM CHLORIDE, SODIUM LACTATE, POTASSIUM CHLORIDE, CALCIUM CHLORIDE 600; 310; 30; 20 MG/100ML; MG/100ML; MG/100ML; MG/100ML
100 INJECTION, SOLUTION INTRAVENOUS CONTINUOUS
Status: DISCONTINUED | OUTPATIENT
Start: 2025-04-09 | End: 2025-04-09 | Stop reason: HOSPADM

## 2025-04-09 RX ORDER — LIDOCAINE HYDROCHLORIDE 10 MG/ML
0.1 INJECTION, SOLUTION INFILTRATION; PERINEURAL ONCE
Status: DISCONTINUED | OUTPATIENT
Start: 2025-04-09 | End: 2025-04-09 | Stop reason: HOSPADM

## 2025-04-09 RX ORDER — FENTANYL CITRATE 50 UG/ML
50 INJECTION, SOLUTION INTRAMUSCULAR; INTRAVENOUS EVERY 5 MIN PRN
Status: DISCONTINUED | OUTPATIENT
Start: 2025-04-09 | End: 2025-04-09 | Stop reason: HOSPADM

## 2025-04-09 RX ORDER — SODIUM CHLORIDE, SODIUM LACTATE, POTASSIUM CHLORIDE, CALCIUM CHLORIDE 600; 310; 30; 20 MG/100ML; MG/100ML; MG/100ML; MG/100ML
50 INJECTION, SOLUTION INTRAVENOUS CONTINUOUS
Status: DISCONTINUED | OUTPATIENT
Start: 2025-04-09 | End: 2025-04-09 | Stop reason: HOSPADM

## 2025-04-09 RX ORDER — ALBUTEROL SULFATE 0.83 MG/ML
2.5 SOLUTION RESPIRATORY (INHALATION) ONCE
Status: DISCONTINUED | OUTPATIENT
Start: 2025-04-09 | End: 2025-04-09 | Stop reason: HOSPADM

## 2025-04-09 RX ADMIN — CEFAZOLIN SODIUM 2 G: 2 INJECTION, SOLUTION INTRAVENOUS at 07:56

## 2025-04-09 RX ADMIN — MEPIVACAINE HYDROCHLORIDE 3.2 ML: 15 INJECTION, SOLUTION EPIDURAL; INFILTRATION at 07:51

## 2025-04-09 RX ADMIN — SODIUM CHLORIDE, POTASSIUM CHLORIDE, SODIUM LACTATE AND CALCIUM CHLORIDE: 600; 310; 30; 20 INJECTION, SOLUTION INTRAVENOUS at 07:45

## 2025-04-09 ASSESSMENT — PAIN - FUNCTIONAL ASSESSMENT
PAIN_FUNCTIONAL_ASSESSMENT: 0-10

## 2025-04-09 ASSESSMENT — PAIN SCALES - GENERAL
PAINLEVEL_OUTOF10: 0 - NO PAIN

## 2025-04-09 NOTE — OP NOTE
"Cystoscopy with Ureteral Stent Removal, Insertion (L) Operative Note     Date: 2025  OR Location: [facility]    Name: Breanna Jackson \"Sheldon", : 1990, Age: 34 y.o., MRN: [id number], Sex: female    Diagnosis  Pre-op Diagnosis      * Calculus of kidney and ureter [N20.2] Post-op Diagnosis     * Calculus of kidney and ureter [N20.2]     Procedures  Cystoscopy with Ureteral Stent Removal, Insertion  82033 - [place] CYSTO W/INSERT URETERAL STENT    Cystoscopy with Ureteral Stent Removal, Insertion  49098 - [place] CYSTO W/SIMPLE REMOVAL STONE & STENT      Surgeons      * Tania De Guzman - Primary    Resident/Fellow/Other Assistant:  Surgeons and Role:  * No surgeons found with a matching role *    Staff:   Scrub Person: Becca  Circulator: Mauro  Scrub Person: Mark    Anesthesia Staff: Anesthesiologist: Bethel Trejo DO  CRNA: SOUMYA Goldstein-CRNA    Procedure Summary  Anesthesia: Spinal  ASA: II  Estimated Blood Loss: 0 mL  Intra-op Medications:   Administrations occurring from 0730 to 0845 on 25:   Medication Name Total Dose   ceFAZolin (Ancef) IV 2 g in 100 mL dextrose (iso) - premix 2 g   mepivacaine PF (Carbocaine) 1.5 % 3.2 mL              Anesthesia Record               Intraprocedure I/O Totals       None           Specimen: No specimens collected              Drains and/or Catheters: * None in log *    Tourniquet Times:         Implants:  Implants       Type Name Action Serial No.      Stent STENT, INLAY OPTIMA, 6FR X 24CM - [id number] Implanted               Findings: 1. Successful exchange left ureteral stent.     Indications: Breanna Jackson \"Sheldon" is an 34 y.o. female who is having surgery for Calculus of kidney and ureter [N20.2].  She is currently 18 weeks pregnant.  She had a previous stent placed for an obstructing stone.  She is here for stent exchange.    The patient was seen in the preoperative area. The risks, benefits, complications, treatment options, non-operative alternatives, " expected recovery and outcomes were discussed with the patient. The possibilities of reaction to medication, pulmonary aspiration, injury to surrounding structures, bleeding, recurrent infection, the need for additional procedures, failure to diagnose a condition, and creating a complication requiring transfusion or operation were discussed with the patient. The patient concurred with the proposed plan, giving informed consent.  The site of surgery was properly noted/marked if necessary per policy. The patient has been actively warmed in preoperative area. Preoperative antibiotics have been ordered and given within 1 hours of incision. Venous thrombosis prophylaxis have been ordered including bilateral sequential compression devices    Procedure Details: The patient was brought to the operating room table.  Spinal anesthesia was induced.  The patient was placed in the dorsolithotomy position.  Her genitalia were prepped and draped.  We introduced a 22 Czech sheath per urethra and we drained the bladder.  We then introduced a cystoscope.  Once we entered the bladder we carefully inspected the bladder mucosa.  There were no abnormalities noted.  We visualized the indwelling left ureteral stent.  This was removed with a grasper.  We next introduced a guidewire into the left ureter.  Using ultrasound we advanced the wire up towards the left kidney.  We next obtained a new 6 Czech by 24 cm double-J stent.  We advanced the stent over our guidewire into the left renal collecting system.  Using ultrasound we visualized deployment within the left kidney.  We also visualized a good curl in the bladder.  At this point we removed our scope.  The patient was awakened and brought to the recovery room in stable condition.  Complications:  None; patient tolerated the procedure well.    Disposition: PACU - hemodynamically stable.  Condition: stable                 Additional Details:     Attending Attestation: I was present and  scrubbed for the entire procedure.    Tania De Guzman  Phone Number: [phone]

## 2025-04-09 NOTE — ANESTHESIA PROCEDURE NOTES
Spinal Block    Patient location during procedure: OR  Start time: 4/9/2025 7:45 AM  End time: 4/9/2025 7:49 AM  Reason for block: primary anesthetic and at surgeon's request  Staffing  Performed: CRNA   Authorized by: Bethel Trejo DO    Performed by: SOUMYA Goldstein-CRNA    Preanesthetic Checklist  Completed: patient identified, IV checked, risks and benefits discussed, surgical consent, pre-op evaluation, timeout performed and sterile techniques followed  Block Timeout  RN/Licensed healthcare professional reads aloud to the Anesthesia provider and entire team: Patient identity, procedure with side and site, patient position, and as applicable the availability of implants/special equipment/special requirements.    Timeout performed at:   Spinal Block  Patient position: sitting  Prep: Betadine  Sterility prep: gloves, drape, cap, mask and hand hygiene  Sedation level: light sedation  Patient monitoring: blood pressure, continuous pulse oximetry and heart rate  Approach: midline  Vertebral space: L4-5  Injection technique: single-shot  Needle  Needle type: pencil-point   Needle gauge: 25 G  Needle length: 3.5 in  Free flowing CSF: yes    Assessment  Sensory level: T10 bilateral  Block outcome: patient comfortable  Procedure assessment: patient tolerated procedure well with no immediate complications  Additional Notes  Time out with surgeon in room, sterile prep and drape, 1% lido skin wheel  2026-06-30  [id number]

## 2025-04-09 NOTE — ANESTHESIA PREPROCEDURE EVALUATION
"Patient: Breanna Jackson \"Netta\"    Evaluation Method: In-person visit    Procedure Information       Date/Time: 04/09/25 0730    Procedure: Cystoscopy with Ureteral Stent Removal, Insertion (Left) - Ultrasound    Location: [facility] / [facility]    Surgeons: Tania De Guzman MD            Relevant Problems   Cardiac   (+) Hyperlipidemia      Pulmonary   (+) OSMEL (obstructive sleep apnea)      Neuro   (+) Lumbar radiculopathy, acute   (+) Meralgia paresthetica of right side   (+) Meralgia paresthetica, left   (+) Sciatica of left side      /Renal   (+) Calculus of kidney and ureter   (+) UTI (urinary tract infection)      Liver   (+) Nonalcoholic fatty liver disease      Endocrine   (+) Class 1 obesity with body mass index (BMI) of 34.0 to 34.9 in adult   (+) Class 2 obesity with body mass index (BMI) of 37.0 to 37.9 in adult   (+) Enlarged thyroid   (+) Obesity      Hematology   (+) Von Willebrand disease (Multi)      HEENT   (+) Hearing loss on right   (+) Mixed conductive and sensorineural hearing loss of right ear with restricted hearing of left ear   (+) Sensorineural hearing loss (SNHL) of left ear with restricted hearing of right ear   (+) Sinus pressure      ID   (+) UTI (urinary tract infection)      GYN   (+) 9 weeks gestation of pregnancy ([facility])   (+) Menorrhagia with irregular cycle   (+) PCOS (polycystic ovarian syndrome)       Clinical information reviewed:   Tobacco  Allergies  Meds  Problems  Med Hx  Surg Hx  OB Status    Fam Hx  Soc Hx        NPO Detail:  NPO/Void Status  Date of Last Liquid: 04/08/25  Time of Last Liquid: 2130  Date of Last Solid: 04/08/25  Time of Last Solid: 1830  Time of Last Void: 0530         OB/GYN     Physical Exam    Airway  Mallampati: II  TM distance: >3 FB     Cardiovascular    Dental    Pulmonary    Abdominal          Anesthesia Plan    History of general anesthesia?: yes  History of complications of general anesthesia?: no    ASA 2     spinal     intravenous " induction   Anesthetic plan and risks discussed with patient.

## 2025-04-09 NOTE — POST-PROCEDURE NOTE
[facility] via cart by RN.  Ice water taken well. Boyfriend in room with pt.  Unable to move feet at this time due to spinal.  1025 lifting bilateral legs up unable to  move feet .  Refused asny crackers cookies   Up to bathroom with assist tolerated well   voiding well.

## 2025-04-09 NOTE — ANESTHESIA POSTPROCEDURE EVALUATION
"Patient: Breanna Jackson \"Netta\"    Procedure Summary       Date: 04/09/25 Room / Location: [facility] / [facility]    Anesthesia Start: 0745 Anesthesia Stop: 0824    Procedure: Cystoscopy with Ureteral Stent Removal, Insertion (Left) Diagnosis:       Calculus of kidney and ureter      (Calculus of kidney and ureter [N20.2])    Surgeons: Tania De Guzman MD Responsible Provider: Bethel Trejo DO    Anesthesia Type: spinal ASA Status: 2            Anesthesia Type: spinal    Vitals Value Taken Time   /77 04/09/25 0836   Temp 36.6 °C (97.9 °F) 04/09/25 0817   Pulse 71 04/09/25 0837   Resp 19 04/09/25 0837   SpO2 100 % 04/09/25 0837   Vitals shown include unfiled device data.    Anesthesia Post Evaluation    Patient location during evaluation: bedside  Patient participation: complete - patient participated  Level of consciousness: awake  Pain management: adequate  Airway patency: patent  Cardiovascular status: acceptable  Respiratory status: acceptable  Hydration status: acceptable  Postoperative Nausea and Vomiting: none    There were no known notable events for this encounter.    "

## 2025-04-10 ENCOUNTER — APPOINTMENT (OUTPATIENT)
Dept: OBSTETRICS AND GYNECOLOGY | Facility: CLINIC | Age: 35
End: 2025-04-10
Payer: MEDICAID

## 2025-04-11 ASSESSMENT — PAIN SCALES - GENERAL: PAINLEVEL_OUTOF10: 0 - NO PAIN

## 2025-04-14 ENCOUNTER — HOSPITAL ENCOUNTER (OUTPATIENT)
Dept: RADIOLOGY | Facility: CLINIC | Age: 35
Discharge: HOME | End: 2025-04-14
Payer: MEDICAID

## 2025-04-14 DIAGNOSIS — Z32.01 PREGNANCY TEST POSITIVE (HHS-HCC): ICD-10-CM

## 2025-04-14 DIAGNOSIS — Z3A.20 20 WEEKS GESTATION OF PREGNANCY (HHS-HCC): ICD-10-CM

## 2025-04-14 PROCEDURE — 76811 OB US DETAILED SNGL FETUS: CPT

## 2025-04-14 PROCEDURE — 76811 OB US DETAILED SNGL FETUS: CPT | Performed by: OBSTETRICS & GYNECOLOGY

## 2025-04-16 ENCOUNTER — HOSPITAL ENCOUNTER (EMERGENCY)
Facility: HOSPITAL | Age: 35
Discharge: HOME | End: 2025-04-16
Payer: MEDICAID

## 2025-04-16 VITALS
HEART RATE: 82 BPM | HEIGHT: 62 IN | RESPIRATION RATE: 18 BRPM | TEMPERATURE: 97.7 F | OXYGEN SATURATION: 100 % | DIASTOLIC BLOOD PRESSURE: 95 MMHG | SYSTOLIC BLOOD PRESSURE: 135 MMHG | BODY MASS INDEX: 34.96 KG/M2 | WEIGHT: 190 LBS

## 2025-04-16 DIAGNOSIS — J01.00 ACUTE NON-RECURRENT MAXILLARY SINUSITIS: Primary | ICD-10-CM

## 2025-04-16 PROCEDURE — 99283 EMERGENCY DEPT VISIT LOW MDM: CPT

## 2025-04-16 PROCEDURE — 99282 EMERGENCY DEPT VISIT SF MDM: CPT

## 2025-04-16 RX ORDER — OXYMETAZOLINE HCL 0.05 %
2 SPRAY, NON-AEROSOL (ML) NASAL EVERY 12 HOURS PRN
Qty: 30 ML | Refills: 0 | Status: SHIPPED | OUTPATIENT
Start: 2025-04-16 | End: 2025-04-18

## 2025-04-16 RX ORDER — CLINDAMYCIN HYDROCHLORIDE 300 MG/1
300 CAPSULE ORAL 3 TIMES DAILY
Qty: 21 CAPSULE | Refills: 0 | Status: SHIPPED | OUTPATIENT
Start: 2025-04-16 | End: 2025-04-23

## 2025-04-16 ASSESSMENT — PAIN SCALES - GENERAL
PAINLEVEL_OUTOF10: 10 - WORST POSSIBLE PAIN
PAINLEVEL_OUTOF10: 6

## 2025-04-16 ASSESSMENT — PAIN - FUNCTIONAL ASSESSMENT: PAIN_FUNCTIONAL_ASSESSMENT: 0-10

## 2025-04-16 ASSESSMENT — COLUMBIA-SUICIDE SEVERITY RATING SCALE - C-SSRS
2. HAVE YOU ACTUALLY HAD ANY THOUGHTS OF KILLING YOURSELF?: NO
1. IN THE PAST MONTH, HAVE YOU WISHED YOU WERE DEAD OR WISHED YOU COULD GO TO SLEEP AND NOT WAKE UP?: NO
6. HAVE YOU EVER DONE ANYTHING, STARTED TO DO ANYTHING, OR PREPARED TO DO ANYTHING TO END YOUR LIFE?: NO

## 2025-04-16 ASSESSMENT — LIFESTYLE VARIABLES
TOTAL SCORE: 0
HAVE PEOPLE ANNOYED YOU BY CRITICIZING YOUR DRINKING: NO
EVER FELT BAD OR GUILTY ABOUT YOUR DRINKING: NO
EVER HAD A DRINK FIRST THING IN THE MORNING TO STEADY YOUR NERVES TO GET RID OF A HANGOVER: NO
HAVE YOU EVER FELT YOU SHOULD CUT DOWN ON YOUR DRINKING: NO

## 2025-04-16 NOTE — ED PROVIDER NOTES
HPI   Chief Complaint   Patient presents with    Sinusitis       HPI  Patient is a 35-year-old female who presents ED for possible sinus infection.  Patient reports left-sided facial pain and flulike symptoms.  Denies any fever or chills.  No respiratory distress or chest pain.  No other acute complaints today.      Patient History   Medical History[1]  Surgical History[2]  Family History[3]  Social History[4]    Physical Exam   ED Triage Vitals [04/16/25 1745]   Temperature Heart Rate Respirations BP   36.5 °C (97.7 °F) 82 18 (!) 135/95      Pulse Ox Temp Source Heart Rate Source Patient Position   100 % Temporal Monitor Sitting      BP Location FiO2 (%)     Left arm --       Physical Exam  HENT:      Nose: Mucosal edema, congestion and rhinorrhea present.     Vitals reviewed.   Constitutional:       General: She is not in acute distress.     Appearance: Normal appearance. She is not ill-appearing.   HENT:      Head: Normocephalic and atraumatic.   Eyes:      Extraocular Movements: Extraocular movements intact.   Cardiovascular:      Rate and Rhythm: Normal rate and regular rhythm.      Heart sounds: Normal heart sounds.   Pulmonary:      Effort: Pulmonary effort is normal.      Breath sounds: Normal breath sounds.   Abdominal:      Palpations: Abdomen is soft.      Tenderness: There is no abdominal tenderness.   Musculoskeletal:         General: Normal range of motion.      Cervical back: Normal range of motion and neck supple.   Skin:     General: Skin is warm and dry.   Neurological:      General: No focal deficit present.      Mental Status: She is alert and oriented to person, place, and time.   Psychiatric:         Mood and Affect: Mood normal.         Behavior: Behavior normal.      ED Course & MDM   Diagnoses as of 04/16/25 1919   Acute non-recurrent maxillary sinusitis                 No data recorded     Saniya Coma Scale Score: 15 (04/16/25 1744 : Katie Mora RN)                           Medical  "Decision Making  Parts of this chart have been completed using voice recognition software. Please excuse any errors of transcription.  My thought process and reason for plan has been formulated from the time that I saw the patient until the time of disposition and is not specific to one specific moment during their visit and furthermore my MDM encompasses this entire chart and not only this text box.    HPI:   A medically appropriate HPI was obtained, outlined above.    Breanna Jackson is a  35 y.o. female    Chief Complaint   Patient presents with    Sinusitis       Medical History[5]    Surgical History[6]    Social History[7]    Family History[8]    Allergies[9]    Current Outpatient Medications   Medication Instructions    clindamycin (CLEOCIN) 300 mg, oral, 3 times daily    ondansetron ODT (Zofran-ODT) 4 mg disintegrating tablet DISSOLVE 1 TABLET IN MOUTH EVERY 8 HOURS AS NEEDED FOR NAUSEA FOR VOMITING    oxymetazoline (Afrin) 0.05 % nasal spray 2 sprays, Each Nostril, Every 12 hours PRN, Do not use for more than 3 days.    prenatal no115/iron/folic acid (PRENATAL 19 ORAL) Take by mouth.   for details    Exam:   Patient Vitals for the past 24 hrs:   BP Temp Temp src Pulse Resp SpO2 Height Weight   04/16/25 1745 (!) 135/95 36.5 °C (97.7 °F) Temporal 82 18 100 % 1.575 m (5' 2\") 86.2 kg (190 lb)       A medically appropriate exam performed, outlined above, given the known history and presentation.    EKG/Cardiac monitor:   If EKG was done and, it was interpreted by attending physician, see their note for ED course for more detail.    Medications given during visit:  Medications - No data to display     Diagnostic/tests:  Labs Reviewed - No data to display     No orders to display          MDM Summary:  Will prescribe clindamycin and Afrin for acute sinusitis.  Return precautions were discussed.  Aftercare instructions provided.  Advised patient follow-up with primary care in 1 to 2 weeks.    We have discussed the " diagnosis and risks, and we agree with discharging home to follow-up with appropriate physician as directed. We also discussed returning to the Emergency Department immediately if new or worsening symptoms occur. We have discussed the symptoms which are most concerning that necessitate immediate return. Pt symptoms have been well controlled here and the patient is safe for discharge with appropriate outpatient follow up. The patient has verbalized understanding to return to ER without delay for new or worsening pains or for any other symptoms or concerns. I utilized the discharge clinical management tool provided [employer] to help estimate risk of negative outcome for this patient.        Disposition:  ED Prescriptions       Medication Sig Dispense Start Date End Date Auth. Provider    clindamycin (Cleocin) 300 mg capsule Take 1 capsule (300 mg) by mouth 3 times a day for 7 days. 21 capsule 4/16/2025 4/23/2025 Javier Aranda PA-C    oxymetazoline (Afrin) 0.05 % nasal spray Administer 2 sprays into each nostril every 12 hours if needed for congestion for up to 2 days. Do not use for more than 3 days. 30 mL 4/16/2025 4/18/2025 Javier Aranda PA-C            All of the patient's questions were answered to the best of my ability. Patient states understanding that they have been screened for an emergency today and we have not found any etiology of symptoms that requires emergent treatment or admission to the hospital at this point. They understand that they have not had definitive care day and require follow-up for treatment of their condition. They also state understanding that they may have an emergent condition that may potentially have not of detected at this visit and they must return to the emergency department if they develop any worsening of symptoms or new complaints.       Procedure  Procedures       [1]   Past Medical History:  Diagnosis Date    Encounter for full-term uncomplicated delivery       (spontaneous vaginal delivery)    Encounter for supervision of normal pregnancy, unspecified, first trimester     Encounter for pregnancy related examination in first trimester    Encounter for supervision of normal pregnancy, unspecified, first trimester     Encounter for pregnancy related examination in first trimester    Other specified health status     No pertinent past medical history    Other specified health status 10/10/2019    No pertinent past surgical history    PCOS (polycystic ovarian syndrome)     Personal history of urinary calculi     History of kidney stones    Unspecified infection of urinary tract in pregnancy, second trimester ([facility]) 2019    UTI (urinary tract infection) in pregnancy in second trimester   [2]   Past Surgical History:  Procedure Laterality Date    OTHER SURGICAL HISTORY  2020    Oral surgery    OTHER SURGICAL HISTORY  06/10/2019    Repair of bladder    WISDOM TOOTH EXTRACTION     [3]   Family History  Problem Relation Name Age of Onset    Other (Cardiac Disorder) Mother      Hypothyroidism Mother      Breast cancer Mother      Uterine cancer Mother      Heart murmur Son      Other (LSVC (Persistent left superior vena cava)) Son      Other (LSVC (persistent left suprior vena cava)) Other Child    [4]   Social History  Tobacco Use    Smoking status: Never    Smokeless tobacco: Never   Vaping Use    Vaping status: Never Used   Substance Use Topics    Alcohol use: Never    Drug use: Never   [5]   Past Medical History:  Diagnosis Date    Encounter for full-term uncomplicated delivery      (spontaneous vaginal delivery)    Encounter for supervision of normal pregnancy, unspecified, first trimester     Encounter for pregnancy related examination in first trimester    Encounter for supervision of normal pregnancy, unspecified, first trimester     Encounter for pregnancy related examination in first trimester    Other specified health status     No pertinent past  medical history    Other specified health status 10/10/2019    No pertinent past surgical history    PCOS (polycystic ovarian syndrome)     Personal history of urinary calculi     History of kidney stones    Unspecified infection of urinary tract in pregnancy, second trimester ([facility]) 09/26/2019    UTI (urinary tract infection) in pregnancy in second trimester   [6]   Past Surgical History:  Procedure Laterality Date    OTHER SURGICAL HISTORY  07/23/2020    Oral surgery    OTHER SURGICAL HISTORY  06/10/2019    Repair of bladder    WISDOM TOOTH EXTRACTION     [7]   Social History  Tobacco Use    Smoking status: Never    Smokeless tobacco: Never   Vaping Use    Vaping status: Never Used   Substance Use Topics    Alcohol use: Never    Drug use: Never   [8]   Family History  Problem Relation Name Age of Onset    Other (Cardiac Disorder) Mother      Hypothyroidism Mother      Breast cancer Mother      Uterine cancer Mother      Heart murmur Son      Other (LSVC (Persistent left superior vena cava)) Son      Other (LSVC (persistent left suprior vena cava)) Other Child    [9]   Allergies  Allergen Reactions    Amoxicillin Anaphylaxis and Hives    Lactose Diarrhea    Penicillins Anaphylaxis and Hives    Prednisone Anaphylaxis and Hives    Raspberry Anaphylaxis    Wheat Hives    Latex Hives        Javier Aranda PA-C  04/16/25 1921

## 2025-04-16 NOTE — ED TRIAGE NOTES
Pt has been having facial pain and pressure for the last wednesday, pt states it gets worse at night. Pt unsure if it is an infected tooth or sinus issues

## 2025-04-23 ENCOUNTER — APPOINTMENT (OUTPATIENT)
Dept: PRIMARY CARE | Facility: CLINIC | Age: 35
End: 2025-04-23
Payer: MEDICAID

## 2025-04-23 ENCOUNTER — APPOINTMENT (OUTPATIENT)
Dept: OBSTETRICS AND GYNECOLOGY | Facility: CLINIC | Age: 35
End: 2025-04-23
Payer: MEDICAID

## 2025-04-23 VITALS — DIASTOLIC BLOOD PRESSURE: 80 MMHG | BODY MASS INDEX: 34.75 KG/M2 | SYSTOLIC BLOOD PRESSURE: 118 MMHG | WEIGHT: 190 LBS

## 2025-04-23 DIAGNOSIS — Z3A.20 20 WEEKS GESTATION OF PREGNANCY (HHS-HCC): ICD-10-CM

## 2025-04-23 LAB
POC BLOOD, URINE: ABNORMAL
POC GLUCOSE, URINE: NEGATIVE MG/DL
POC LEUKOCYTES, URINE: NEGATIVE
POC NITRITE,URINE: NEGATIVE
POC PROTEIN, URINE: ABNORMAL MG/DL

## 2025-04-23 PROCEDURE — 99213 OFFICE O/P EST LOW 20 MIN: CPT | Performed by: OBSTETRICS & GYNECOLOGY

## 2025-04-23 NOTE — PROGRESS NOTES
"Subjective   Patient ID [id number]   Netta Jackson is a 35 y.o.  at 20w4d with a working estimated date of delivery of 2025, by Ultrasound who presents for a routine prenatal visit. She denies vaginal bleeding, leakage of fluid, decreased fetal movements, or contractions.    Her pregnancy is complicated by:  Had stent replaced .  Stent will stay in place till end of pregnancy as she has an 8 mm stone.  Likely treatment postpartum.  Taking iron for anemia.  Has follow-up to complete her anatomic views.  Placenta previa has resolved..  No more bleeding    Objective   Physical Exam  Weight: 86.2 kg (190 lb)  Expected Total Weight Gain: Could not be calculated   Pregravid BMI: Could not be calculated  BP: 118/80         Prenatal Labs  Urine dip:  Lab Results   Component Value Date    KETONESU 10 (1+) (A) 2025       Lab Results   Component Value Date    HGB 10.8 (L) 2025    HCT 31.6 (L) 2025    ABO O 2025    HEPBSAG Nonreactive 2025     No results found for: \"PAPPA\", \"AFP\", \"HCG\", \"ESTRIOL\", \"INHBA\"  No results found for: \"GLUF\", \"GLUT1\", \"RXFHKVM0RC\", \"KSXHSML3KO\"    Imaging  The most recent ultrasound was performed on The most recent ultrasound study is not finalized with a study GA of The most recent ultrasound study is not finalized and EFW of The most recent ultrasound study is not finalized.  The most recent ultrasound study is not finalized  The most recent ultrasound study is not finalized    Assessment/Plan       Continue prenatal vitamin.  Taking 1 baby aspirin daily  Labs reviewed.  Rhogam not indicated.  Her blood type is O+  GTT 24 to 28 weeks.    Follow up in 4 weeks for a routine prenatal visit.  "

## 2025-04-29 ENCOUNTER — HOSPITAL ENCOUNTER (OUTPATIENT)
Dept: RADIOLOGY | Facility: CLINIC | Age: 35
Discharge: HOME | End: 2025-04-29
Payer: MEDICAID

## 2025-04-29 DIAGNOSIS — O35.9XX0 MATERNAL CARE FOR (SUSPECTED) FETAL ABNORMALITY AND DAMAGE, UNSPECIFIED, NOT APPLICABLE OR UNSPECIFIED: ICD-10-CM

## 2025-04-29 DIAGNOSIS — Z32.01 PREGNANCY TEST POSITIVE (HHS-HCC): ICD-10-CM

## 2025-04-29 DIAGNOSIS — Z3A.20 20 WEEKS GESTATION OF PREGNANCY (HHS-HCC): ICD-10-CM

## 2025-04-29 PROCEDURE — 76816 OB US FOLLOW-UP PER FETUS: CPT

## 2025-04-29 PROCEDURE — 76816 OB US FOLLOW-UP PER FETUS: CPT | Performed by: OBSTETRICS & GYNECOLOGY

## 2025-05-01 ENCOUNTER — APPOINTMENT (OUTPATIENT)
Dept: OBSTETRICS AND GYNECOLOGY | Facility: CLINIC | Age: 35
End: 2025-05-01
Payer: MEDICAID

## 2025-05-07 ENCOUNTER — APPOINTMENT (OUTPATIENT)
Dept: OBSTETRICS AND GYNECOLOGY | Facility: CLINIC | Age: 35
End: 2025-05-07
Payer: MEDICAID

## 2025-05-12 ENCOUNTER — ROUTINE PRENATAL (OUTPATIENT)
Dept: OBSTETRICS AND GYNECOLOGY | Facility: CLINIC | Age: 35
End: 2025-05-12
Payer: MEDICAID

## 2025-05-12 VITALS — WEIGHT: 189 LBS | SYSTOLIC BLOOD PRESSURE: 110 MMHG | DIASTOLIC BLOOD PRESSURE: 70 MMHG | BODY MASS INDEX: 34.57 KG/M2

## 2025-05-12 DIAGNOSIS — O26.892 DYSURIA DURING PREGNANCY IN SECOND TRIMESTER (HHS-HCC): Primary | ICD-10-CM

## 2025-05-12 DIAGNOSIS — R30.0 DYSURIA DURING PREGNANCY IN SECOND TRIMESTER (HHS-HCC): Primary | ICD-10-CM

## 2025-05-12 DIAGNOSIS — Z3A.23 23 WEEKS GESTATION OF PREGNANCY (HHS-HCC): ICD-10-CM

## 2025-05-12 PROCEDURE — 99213 OFFICE O/P EST LOW 20 MIN: CPT | Performed by: MIDWIFE

## 2025-05-12 RX ORDER — ASPIRIN 81 MG/1
81 TABLET ORAL DAILY
COMMUNITY

## 2025-05-12 RX ORDER — NITROFURANTOIN 25; 75 MG/1; MG/1
100 CAPSULE ORAL 2 TIMES DAILY
Qty: 14 CAPSULE | Refills: 0 | Status: SHIPPED | OUTPATIENT
Start: 2025-05-12 | End: 2025-05-19

## 2025-05-12 NOTE — PROGRESS NOTES
"Subjective   Patient ID [id number]   Netta Jackson is a 35 y.o.  at 23w2d with a working estimated date of delivery of 2025, by Ultrasound who presents for a routine prenatal visit. She denies vaginal bleeding, leakage of fluid, decreased fetal movements, or contractions.  Pt c/o x 4 days dysuria    Her pregnancy is complicated by:  Prefers 'Netta'.  Obesity in pregnancy.  History of von Willebrand's.  Obtain fetal echo as history of abnormal cardiac ultrasound findings in previous pregnancies that resolved spontaneously.  Renal stone.  Stent placed left side.  Weekly NST 34 weeks.  Recommend baby aspirin daily; Vaginal bleeding-History of postpartum hemorrhage after first pregnancy no transfusion.  Last saw hematologist in .  BMI greater than 30.  Low-lying placenta/placenta previa has resolved    Objective   Physical Exam  Weight: 85.7 kg (189 lb)  Expected Total Weight Gain: Could not be calculated   Pregravid BMI: Could not be calculated  BP: 110/70         Prenatal Labs  Urine dip:  Lab Results   Component Value Date    KETONESU 10 (1+) (A) 2025       Lab Results   Component Value Date    HGB 10.8 (L) 2025    HCT 31.6 (L) 2025    ABO O 2025    HEPBSAG Nonreactive 2025     No results found for: \"PAPPA\", \"AFP\", \"HCG\", \"ESTRIOL\", \"INHBA\"  No results found for: \"GLUF\", \"GLUT1\", \"ZPTQTXR4KV\", \"UEGLXJA0CV\"    Imaging  The most recent ultrasound was performed on   with a study GA of   and EFW of  .          Assessment/Plan   Diagnoses and all orders for this visit:  Dysuria during pregnancy in second trimester ([facility])  -     POCT UA (nonautomated) manually resulted      Continue prenatal vitamin.  Labs reviewed.    Importance of staying well hydrated discussed.  Warning s/sx reviewed  Follow up in 2 weeks for a routine prenatal visit.  "

## 2025-05-15 ENCOUNTER — APPOINTMENT (OUTPATIENT)
Dept: OBSTETRICS AND GYNECOLOGY | Facility: CLINIC | Age: 35
End: 2025-05-15
Payer: MEDICAID

## 2025-05-15 LAB — BACTERIA UR CULT: ABNORMAL

## 2025-05-21 ENCOUNTER — APPOINTMENT (OUTPATIENT)
Dept: OBSTETRICS AND GYNECOLOGY | Facility: CLINIC | Age: 35
End: 2025-05-21
Payer: MEDICAID

## 2025-05-21 VITALS — BODY MASS INDEX: 35.12 KG/M2 | WEIGHT: 192 LBS | DIASTOLIC BLOOD PRESSURE: 68 MMHG | SYSTOLIC BLOOD PRESSURE: 110 MMHG

## 2025-05-21 DIAGNOSIS — O12.12: ICD-10-CM

## 2025-05-21 DIAGNOSIS — R82.998 LEUKOCYTES IN URINE: ICD-10-CM

## 2025-05-21 DIAGNOSIS — R31.1 BENIGN ESSENTIAL MICROSCOPIC HEMATURIA: ICD-10-CM

## 2025-05-21 DIAGNOSIS — Z3A.24 24 WEEKS GESTATION OF PREGNANCY (HHS-HCC): ICD-10-CM

## 2025-05-21 PROCEDURE — 99213 OFFICE O/P EST LOW 20 MIN: CPT | Performed by: OBSTETRICS & GYNECOLOGY

## 2025-05-21 NOTE — PROGRESS NOTES
"Subjective   Patient ID [id number]   Netta Jackson is a 35 y.o.  at 24w4d with a working estimated date of delivery of 2025, by Ultrasound who presents for a routine prenatal visit. She denies vaginal bleeding, leakage of fluid, decreased fetal movements, or contractions.    Her pregnancy is complicated by:  Has a stent in place.  Will be removed after pregnancy.  BMI of 35.  Glucola ordered.  Ultrasound reviewed.    Objective   Physical Exam  Weight: 87.1 kg (192 lb)  Expected Total Weight Gain: Could not be calculated   Pregravid BMI: Could not be calculated  BP: 110/68         Prenatal Labs  Urine dip:  Lab Results   Component Value Date    KETONESU 10 (1+) (A) 2025       Lab Results   Component Value Date    HGB 10.8 (L) 2025    HCT 31.6 (L) 2025    ABO O 2025    HEPBSAG Nonreactive 2025     No results found for: \"PAPPA\", \"AFP\", \"HCG\", \"ESTRIOL\", \"INHBA\"  No results found for: \"GLUF\", \"GLUT1\", \"TZJFUHP1GN\", \"FYQLKOX5FY\"    Imaging  The most recent ultrasound was performed on   with a study GA of   and EFW of  .          Assessment/Plan   Doing well.  Labs and ultrasound reviewed.  Has stent in place.  Glucola ordered    Continue prenatal vitamin.  Labs reviewed.  Rhogam not indicated.  Blood type O+  GTT ordered.    Follow up in 4 weeks for a routine prenatal visit.  "

## 2025-05-22 LAB
HCT VFR BLD AUTO: 34.4 % (ref 35–45)
HGB BLD-MCNC: 10.9 G/DL (ref 11.7–15.5)

## 2025-05-23 LAB — GLUCOSE 1H P 50 G GLC PO SERPL-MCNC: 107 MG/DL

## 2025-05-24 LAB — BACTERIA UR CULT: ABNORMAL

## 2025-05-27 ENCOUNTER — TELEPHONE (OUTPATIENT)
Dept: OBSTETRICS AND GYNECOLOGY | Facility: CLINIC | Age: 35
End: 2025-05-27
Payer: MEDICAID

## 2025-05-27 ENCOUNTER — HOSPITAL ENCOUNTER (EMERGENCY)
Facility: HOSPITAL | Age: 35
Discharge: HOME | End: 2025-05-27
Payer: MEDICAID

## 2025-05-27 VITALS
OXYGEN SATURATION: 100 % | RESPIRATION RATE: 16 BRPM | HEIGHT: 62 IN | WEIGHT: 190 LBS | TEMPERATURE: 97.7 F | HEART RATE: 80 BPM | DIASTOLIC BLOOD PRESSURE: 82 MMHG | BODY MASS INDEX: 34.96 KG/M2 | SYSTOLIC BLOOD PRESSURE: 112 MMHG

## 2025-05-27 DIAGNOSIS — N30.01 ACUTE CYSTITIS WITH HEMATURIA: Primary | ICD-10-CM

## 2025-05-27 LAB
APPEARANCE UR: ABNORMAL
BACTERIA #/AREA URNS AUTO: ABNORMAL /HPF
BILIRUB UR STRIP.AUTO-MCNC: NEGATIVE MG/DL
COLOR UR: ABNORMAL
GLUCOSE UR STRIP.AUTO-MCNC: NORMAL MG/DL
HOLD SPECIMEN: NORMAL
KETONES UR STRIP.AUTO-MCNC: NEGATIVE MG/DL
LEUKOCYTE ESTERASE UR QL STRIP.AUTO: ABNORMAL
NITRITE UR QL STRIP.AUTO: NEGATIVE
PH UR STRIP.AUTO: 6.5 [PH]
PROT UR STRIP.AUTO-MCNC: ABNORMAL MG/DL
RBC # UR STRIP.AUTO: ABNORMAL MG/DL
RBC #/AREA URNS AUTO: >20 /HPF
SP GR UR STRIP.AUTO: 1.01
SQUAMOUS #/AREA URNS AUTO: ABNORMAL /HPF
UROBILINOGEN UR STRIP.AUTO-MCNC: NORMAL MG/DL
WBC #/AREA URNS AUTO: >50 /HPF

## 2025-05-27 PROCEDURE — 99283 EMERGENCY DEPT VISIT LOW MDM: CPT

## 2025-05-27 PROCEDURE — 87186 SC STD MICRODIL/AGAR DIL: CPT | Mod: WESLAB

## 2025-05-27 PROCEDURE — 81001 URINALYSIS AUTO W/SCOPE: CPT

## 2025-05-27 PROCEDURE — 2500000001 HC RX 250 WO HCPCS SELF ADMINISTERED DRUGS (ALT 637 FOR MEDICARE OP)

## 2025-05-27 RX ORDER — GRANULES FOR ORAL 3 G/1
3 POWDER ORAL ONCE
Status: COMPLETED | OUTPATIENT
Start: 2025-05-27 | End: 2025-05-27

## 2025-05-27 RX ADMIN — FOSFOMYCIN TROMETHAMINE 3 G: 3 GRANULE, FOR SOLUTION ORAL at 13:59

## 2025-05-27 ASSESSMENT — PAIN SCALES - GENERAL
PAINLEVEL_OUTOF10: 3
PAINLEVEL_OUTOF10: 5 - MODERATE PAIN

## 2025-05-27 ASSESSMENT — PAIN DESCRIPTION - LOCATION: LOCATION: PELVIS

## 2025-05-27 ASSESSMENT — LIFESTYLE VARIABLES
EVER FELT BAD OR GUILTY ABOUT YOUR DRINKING: NO
HAVE YOU EVER FELT YOU SHOULD CUT DOWN ON YOUR DRINKING: NO
EVER HAD A DRINK FIRST THING IN THE MORNING TO STEADY YOUR NERVES TO GET RID OF A HANGOVER: NO
TOTAL SCORE: 0
HAVE PEOPLE ANNOYED YOU BY CRITICIZING YOUR DRINKING: NO

## 2025-05-27 ASSESSMENT — PAIN DESCRIPTION - PROGRESSION: CLINICAL_PROGRESSION: NOT CHANGED

## 2025-05-27 ASSESSMENT — PAIN DESCRIPTION - PAIN TYPE: TYPE: ACUTE PAIN

## 2025-05-27 ASSESSMENT — PAIN - FUNCTIONAL ASSESSMENT: PAIN_FUNCTIONAL_ASSESSMENT: 0-10

## 2025-05-27 NOTE — DISCHARGE INSTRUCTIONS
Thank you for choosing [facility] for your care today. Please follow up as indicated as continued care and treatment is a very important part of your care today. Please return to this or any Emergency Department if you have any new or worsening symptoms.    You were given a dose of fosfomycin to help treat your UTI.  This is a one-time dose medication.  We recommend you follow-up with primary care physician as well as OB/GYN outpatient.  Return to the emergency department with any new or worsening symptoms.

## 2025-05-27 NOTE — ED PROVIDER NOTES
HPI   Chief Complaint   Patient presents with    Urinary Frequency     Patient tested positive for uti. Her doctor will not call her back to give her the medications. 25 weeks pregnant        Patient is a 35-year-old female G5, P3 presenting to the emergency department for evaluation of dysuria.  Patient states she has had increased urinary frequency as well as dysuria for approximately 2 weeks.  She states she was previously treated on Macrobid with no improvement in symptoms.  She states she tried calling her OB/GYN back however they were not answering and therefore she could not get a prescription for a UTI.  She does admit to having penicillin and amoxicillin allergy.  She denies any significant abdominal pain.  She denies any fevers, chills, nausea, vomiting, constipation, diarrhea.              Patient History   Medical History[1]  Surgical History[2]  Family History[3]  Social History[4]    Physical Exam   ED Triage Vitals [05/27/25 1225]   Temperature Heart Rate Respirations BP   36.5 °C (97.7 °F) 97 18 (!) 145/101      Pulse Ox Temp Source Heart Rate Source Patient Position   100 % Temporal -- Sitting      BP Location FiO2 (%)     Left arm --       Physical Exam  Vitals and nursing note reviewed.   Constitutional:       General: She is not in acute distress.     Appearance: Normal appearance. She is not ill-appearing or toxic-appearing.   HENT:      Head: Normocephalic and atraumatic.      Nose: Nose normal.      Mouth/Throat:      Mouth: Mucous membranes are moist.   Eyes:      Extraocular Movements: Extraocular movements intact.      Pupils: Pupils are equal, round, and reactive to light.   Cardiovascular:      Rate and Rhythm: Normal rate and regular rhythm.      Pulses: Normal pulses.   Pulmonary:      Effort: Pulmonary effort is normal.      Breath sounds: Normal breath sounds.   Abdominal:      Tenderness: There is no abdominal tenderness. There is no right CVA tenderness, left CVA tenderness,  guarding or rebound.   Musculoskeletal:         General: Normal range of motion.      Cervical back: Normal range of motion.   Skin:     General: Skin is warm and dry.   Neurological:      General: No focal deficit present.      Mental Status: She is alert and oriented to person, place, and time.   Psychiatric:         Mood and Affect: Mood normal.         Behavior: Behavior normal.           ED Course & MDM   Diagnoses as of 05/27/25 1325   Acute cystitis with hematuria                 No data recorded     Saniya Coma Scale Score: 15 (05/27/25 1225 : Shannen Green RN)                           Medical Decision Making  **Disclaimer parts of this chart have been completed using voice recognition software. Please excuse any errors of transcription.     Evaluated this patient independently and my supervising physician was available for consultation.    HPI: Detailed above.    Exam: A medically appropriate exam performed, outlined above, given the known history and presentation.    History obtained from: Patient    Labs/Diagnostics:  Labs Reviewed   URINALYSIS WITH REFLEX CULTURE AND MICROSCOPIC - Abnormal       Result Value    Color, Urine Light-Yellow      Appearance, Urine Turbid (*)     Specific Gravity, Urine 1.015      pH, Urine 6.5      Protein, Urine 10 (TRACE)      Glucose, Urine Normal      Blood, Urine 0.2 (2+) (*)     Ketones, Urine NEGATIVE      Bilirubin, Urine NEGATIVE      Urobilinogen, Urine Normal      Nitrite, Urine NEGATIVE      Leukocyte Esterase, Urine 500 Carlos/uL (*)    MICROSCOPIC ONLY, URINE - Abnormal    WBC, Urine >50 (*)     RBC, Urine >20 (*)     Squamous Epithelial Cells, Urine 1-9 (SPARSE)      Bacteria, Urine 1+ (*)    URINE CULTURE   URINALYSIS WITH REFLEX CULTURE AND MICROSCOPIC    Narrative:     The following orders were created for panel order Urinalysis with Reflex Culture and Microscopic.  Procedure                               Abnormality         Status                    "  ---------                               -----------         ------                     Urinalysis with Reflex C...[[id number]]  Abnormal            Final result               Extra Urine Gray Tube[[id number]]                            In process                   Please view results for these tests on the individual orders.   EXTRA URINE GRAY TUBE     EMERGENCY DEPARTMENT COURSE and DIFFERENTIAL DIAGNOSIS/MDM:  Patient is a 35-year-old female presenting to the emergency department for evaluation of dysuria and increased urinary frequency.  On physical exam vital signs remarkable for hypertension but otherwise stable and patient is in no acute distress.  Abdominal exam is benign.  Urinalysis ordered as previous urinalysis was done on 5/1/2025.  Urine showed evidence of infection with 500 leukocyte esterase and 1+ bacteria.  Spoke with pharmacy regarding antibiotics to give the patient for UTI.  Recommended fosfomycin.  Patient given fosfomycin in the emergency department.  Discharged in stable condition.  Will follow-up with primary care physician and OB/GYN outpatient.  She will return to the emergency department with any new or worsening symptoms.    The patient presented with a chief complaint of UTI in pregnancy. The differential diagnosis associated with this patient's presentation includes UTI/pyelonephritis, dysuria, nephrolithiasis.     Vitals:    Vitals:    05/27/25 1225 05/27/25 1225   BP: (!) 145/101    BP Location: Left arm    Patient Position: Sitting    Pulse: 97    Resp: 18    Temp: 36.5 °C (97.7 °F)    TempSrc: Temporal    SpO2: 100%    Weight:  86.2 kg (190 lb)   Height:  1.575 m (5' 2\")       Diagnoses as of 05/27/25 1325   Acute cystitis with hematuria       History Limited by:    None    Independent history obtained from:    None    External records reviewed:    None    Diagnostics interpreted by me:    None    Discussions with other clinicians:    None    Chronic conditions impacting " care:    None    Social determinants of health affecting care:    None    Diagnostic tests considered but not performed: None    ED Medications managed:    Medications   fosfomycin (Monurol) packet 3 g (has no administration in time range)       Prescription drugs considered:    None    Screenings:              Procedure  Procedures         [1]   Past Medical History:  Diagnosis Date    Encounter for full-term uncomplicated delivery      (spontaneous vaginal delivery)    Encounter for supervision of normal pregnancy, unspecified, first trimester     Encounter for pregnancy related examination in first trimester    Encounter for supervision of normal pregnancy, unspecified, first trimester     Encounter for pregnancy related examination in first trimester    Other specified health status     No pertinent past medical history    Other specified health status 10/10/2019    No pertinent past surgical history    PCOS (polycystic ovarian syndrome)     Personal history of urinary calculi     History of kidney stones    Unspecified infection of urinary tract in pregnancy, second trimester ([facility]) 2019    UTI (urinary tract infection) in pregnancy in second trimester   [2]   Past Surgical History:  Procedure Laterality Date    OTHER SURGICAL HISTORY  2020    Oral surgery    OTHER SURGICAL HISTORY  06/10/2019    Repair of bladder    WISDOM TOOTH EXTRACTION     [3]   Family History  Problem Relation Name Age of Onset    Other (Cardiac Disorder) Mother      Hypothyroidism Mother      Breast cancer Mother      Uterine cancer Mother      Heart murmur Son      Other (LSVC (Persistent left superior vena cava)) Son      Other (LSVC (persistent left suprior vena cava)) Other Child    [4]   Social History  Tobacco Use    Smoking status: Never    Smokeless tobacco: Never   Vaping Use    Vaping status: Never Used   Substance Use Topics    Alcohol use: Never    Drug use: Never        Ellen Mayer PA-C  25  [id number]

## 2025-05-27 NOTE — Clinical Note
Breanna Jackson was seen and treated in our emergency department on 5/27/2025.  She may return to work on 05/28/2025.       If you have any questions or concerns, please don't hesitate to call.      Ellen Mayer PA-C

## 2025-05-28 DIAGNOSIS — O23.42 URINARY TRACT INFECTION IN MOTHER DURING SECOND TRIMESTER OF PREGNANCY (HHS-HCC): Primary | ICD-10-CM

## 2025-05-28 RX ORDER — NITROFURANTOIN 25; 75 MG/1; MG/1
100 CAPSULE ORAL 2 TIMES DAILY
Qty: 14 CAPSULE | Refills: 0 | Status: SHIPPED | OUTPATIENT
Start: 2025-05-28 | End: 2025-06-04

## 2025-05-28 NOTE — TELEPHONE ENCOUNTER
T/c returned to pt.  Pt was seen yesterday by provider outside our practice and txed for UTI with Monurol.  UA C&S is still pending.

## 2025-05-29 ENCOUNTER — APPOINTMENT (OUTPATIENT)
Dept: OBSTETRICS AND GYNECOLOGY | Facility: CLINIC | Age: 35
End: 2025-05-29
Payer: MEDICAID

## 2025-05-30 LAB — BACTERIA UR CULT: ABNORMAL

## 2025-05-31 ENCOUNTER — TELEPHONE (OUTPATIENT)
Dept: PHARMACY | Facility: HOSPITAL | Age: 35
End: 2025-05-31
Payer: MEDICAID

## 2025-05-31 NOTE — PROGRESS NOTES
EDPD Note: Antibiotics Reviewed and Warranted    Contacted Mr./Mrs./Ms. Breanna Jackson regarding a positive urine culture/result that was taken during their recent emergency room visit. I completed education with patient . The patient is being treated appropriately with Macrobid 100 mg twice daily for 7 days.     Patient presented to the ED 5/27 with symptoms of urinary frequency following urine cultures taken 5/21 showing Enterococcus faecalis. Patient is pregnant. Given 1 dose of Monurol. Discharged without home antibiotics. Symptoms worsened. Repeat cultures resulted positive for Enterococcus faecalis. Patient contacted OB/GYN office who prescribed Macrobid 100 mg twice daily for 7 days - chosen due to penicillin allergy. Appropriate duration given pregnancy. Patient took her first dose yesterday and has not felt improvement - encouraged her to take a few more doses and follow up if no improvement.    Susceptibility data from last 90 days.  Collected Specimen Info Organism Ampicillin Ciprofloxacin Levofloxacin Nitrofurantoin Penicillin Trimethoprim/Sulfamethoxazole Vancomycin   05/27/25 Urine from Clean Catch/Voided Enterococcus faecalis  S  S  S  S  S  R  S        No further follow up needed from EDPD Team.     Barry Patel, PharmD

## 2025-06-04 ENCOUNTER — APPOINTMENT (OUTPATIENT)
Dept: OBSTETRICS AND GYNECOLOGY | Facility: CLINIC | Age: 35
End: 2025-06-04
Payer: MEDICAID

## 2025-06-04 VITALS — BODY MASS INDEX: 35.12 KG/M2 | WEIGHT: 192 LBS | DIASTOLIC BLOOD PRESSURE: 74 MMHG | SYSTOLIC BLOOD PRESSURE: 106 MMHG

## 2025-06-04 DIAGNOSIS — O12.12: ICD-10-CM

## 2025-06-04 DIAGNOSIS — R31.1 BENIGN ESSENTIAL MICROSCOPIC HEMATURIA: ICD-10-CM

## 2025-06-04 DIAGNOSIS — Z3A.26 26 WEEKS GESTATION OF PREGNANCY (HHS-HCC): ICD-10-CM

## 2025-06-04 DIAGNOSIS — R82.998 LEUKOCYTES IN URINE: ICD-10-CM

## 2025-06-04 PROCEDURE — 81002 URINALYSIS NONAUTO W/O SCOPE: CPT | Performed by: OBSTETRICS & GYNECOLOGY

## 2025-06-04 PROCEDURE — 99213 OFFICE O/P EST LOW 20 MIN: CPT | Performed by: OBSTETRICS & GYNECOLOGY

## 2025-06-04 NOTE — PROGRESS NOTES
"Subjective   Patient ID [id number]   Netta Jackson is a 35 y.o.  at 26w4d with a working estimated date of delivery of 2025, by Ultrasound who presents for a routine prenatal visit. She denies vaginal bleeding, leakage of fluid, decreased fetal movements, or contractions.    Her pregnancy is complicated by:  Stent in place.  Recurrent UTI.  Finishing course of Macrobid.  Urinalysis is abnormal still having symptoms.    Recommend she contact her urologist who she last spoke with a month ago.  Finish current course of antibiotics send urine for culture and sensitivity Glucola normal    Objective   Physical Exam  Weight: 87.1 kg (192 lb)  Expected Total Weight Gain: Could not be calculated   Pregravid BMI: Could not be calculated  BP: 106/74         Prenatal Labs  Urine dip:  Lab Results   Component Value Date    KETONESU NEGATIVE 2025       Lab Results   Component Value Date    HGB 10.9 (L) 2025    HCT 34.4 (L) 2025    ABO O 2025    HEPBSAG Nonreactive 2025     No results found for: \"PAPPA\", \"AFP\", \"HCG\", \"ESTRIOL\", \"INHBA\"  No results found for: \"GLUF\", \"GLUT1\", \"BLLDTEX9IB\", \"UWOOXKW0VP\"    Imaging  The most recent ultrasound was performed on   with a study GA of   and EFW of  .          Assessment/Plan   Recurrent UTI in pregnancy.  Stent in place..  Please contact urologist ASAP.  Finish current course and we will await results    Continue prenatal vitamin.  Labs reviewed.  Rhogam not indicated blood type is O+  GTT normal.  Recurrent UTI in pregnancy, stent in place.  Follow up in 4 weeks for a routine prenatal visit.  "

## 2025-06-05 ENCOUNTER — HOSPITAL ENCOUNTER (OUTPATIENT)
Facility: HOSPITAL | Age: 35
Setting detail: OUTPATIENT SURGERY
End: 2025-06-05
Attending: SURGERY | Admitting: SURGERY
Payer: MEDICAID

## 2025-06-05 DIAGNOSIS — N20.1 CALCULUS OF URETER: Primary | ICD-10-CM

## 2025-06-07 LAB — BACTERIA UR CULT: ABNORMAL

## 2025-06-08 ENCOUNTER — APPOINTMENT (OUTPATIENT)
Dept: RADIOLOGY | Facility: HOSPITAL | Age: 35
End: 2025-06-08
Payer: MEDICAID

## 2025-06-08 ENCOUNTER — HOSPITAL ENCOUNTER (EMERGENCY)
Facility: HOSPITAL | Age: 35
Discharge: LEFT BEFORE DISCHARGE | End: 2025-06-08
Attending: EMERGENCY MEDICINE
Payer: MEDICAID

## 2025-06-08 VITALS
SYSTOLIC BLOOD PRESSURE: 138 MMHG | OXYGEN SATURATION: 96 % | WEIGHT: 192 LBS | DIASTOLIC BLOOD PRESSURE: 102 MMHG | BODY MASS INDEX: 35.33 KG/M2 | HEART RATE: 86 BPM | TEMPERATURE: 98.1 F | HEIGHT: 62 IN | RESPIRATION RATE: 20 BRPM

## 2025-06-08 DIAGNOSIS — N39.0 ACUTE UTI (URINARY TRACT INFECTION): Primary | ICD-10-CM

## 2025-06-08 LAB
ALBUMIN SERPL BCP-MCNC: 3.9 G/DL (ref 3.4–5)
ALP SERPL-CCNC: 71 U/L (ref 33–110)
ALT SERPL W P-5'-P-CCNC: 12 U/L (ref 7–45)
ANION GAP SERPL CALCULATED.3IONS-SCNC: 13 MMOL/L (ref 10–20)
APPEARANCE UR: ABNORMAL
AST SERPL W P-5'-P-CCNC: 12 U/L (ref 9–39)
BACTERIA #/AREA URNS AUTO: ABNORMAL /HPF
BASOPHILS # BLD AUTO: 0.03 X10*3/UL (ref 0–0.1)
BASOPHILS NFR BLD AUTO: 0.4 %
BILIRUB SERPL-MCNC: 0.3 MG/DL (ref 0–1.2)
BILIRUB UR STRIP.AUTO-MCNC: NEGATIVE MG/DL
BUN SERPL-MCNC: 10 MG/DL (ref 6–23)
CALCIUM SERPL-MCNC: 9.1 MG/DL (ref 8.6–10.3)
CHLORIDE SERPL-SCNC: 102 MMOL/L (ref 98–107)
CO2 SERPL-SCNC: 21 MMOL/L (ref 21–32)
COLOR UR: ABNORMAL
CREAT SERPL-MCNC: 0.91 MG/DL (ref 0.5–1.05)
EGFRCR SERPLBLD CKD-EPI 2021: 85 ML/MIN/1.73M*2
EOSINOPHIL # BLD AUTO: 0.05 X10*3/UL (ref 0–0.7)
EOSINOPHIL NFR BLD AUTO: 0.7 %
ERYTHROCYTE [DISTWIDTH] IN BLOOD BY AUTOMATED COUNT: 13.8 % (ref 11.5–14.5)
GLUCOSE SERPL-MCNC: 94 MG/DL (ref 74–99)
GLUCOSE UR STRIP.AUTO-MCNC: NORMAL MG/DL
HCG UR QL IA.RAPID: POSITIVE
HCT VFR BLD AUTO: 35.2 % (ref 36–46)
HGB BLD-MCNC: 11.8 G/DL (ref 12–16)
HOLD SPECIMEN: NORMAL
IMM GRANULOCYTES # BLD AUTO: 0.03 X10*3/UL (ref 0–0.7)
IMM GRANULOCYTES NFR BLD AUTO: 0.4 % (ref 0–0.9)
KETONES UR STRIP.AUTO-MCNC: NEGATIVE MG/DL
LEUKOCYTE ESTERASE UR QL STRIP.AUTO: ABNORMAL
LYMPHOCYTES # BLD AUTO: 1.5 X10*3/UL (ref 1.2–4.8)
LYMPHOCYTES NFR BLD AUTO: 19.7 %
MCH RBC QN AUTO: 28.4 PG (ref 26–34)
MCHC RBC AUTO-ENTMCNC: 33.5 G/DL (ref 32–36)
MCV RBC AUTO: 85 FL (ref 80–100)
MONOCYTES # BLD AUTO: 0.43 X10*3/UL (ref 0.1–1)
MONOCYTES NFR BLD AUTO: 5.6 %
NEUTROPHILS # BLD AUTO: 5.58 X10*3/UL (ref 1.2–7.7)
NEUTROPHILS NFR BLD AUTO: 73.2 %
NITRITE UR QL STRIP.AUTO: NEGATIVE
NRBC BLD-RTO: 0 /100 WBCS (ref 0–0)
PH UR STRIP.AUTO: 7.5 [PH]
PLATELET # BLD AUTO: 264 X10*3/UL (ref 150–450)
POTASSIUM SERPL-SCNC: 4.3 MMOL/L (ref 3.5–5.3)
PROT SERPL-MCNC: 7 G/DL (ref 6.4–8.2)
PROT UR STRIP.AUTO-MCNC: NEGATIVE MG/DL
RBC # BLD AUTO: 4.16 X10*6/UL (ref 4–5.2)
RBC # UR STRIP.AUTO: ABNORMAL MG/DL
RBC #/AREA URNS AUTO: >20 /HPF
SODIUM SERPL-SCNC: 132 MMOL/L (ref 136–145)
SP GR UR STRIP.AUTO: 1.01
SQUAMOUS #/AREA URNS AUTO: ABNORMAL /HPF
UROBILINOGEN UR STRIP.AUTO-MCNC: NORMAL MG/DL
WBC # BLD AUTO: 7.6 X10*3/UL (ref 4.4–11.3)
WBC #/AREA URNS AUTO: >50 /HPF
WBC CLUMPS #/AREA URNS AUTO: ABNORMAL /HPF

## 2025-06-08 PROCEDURE — 80053 COMPREHEN METABOLIC PANEL: CPT | Performed by: EMERGENCY MEDICINE

## 2025-06-08 PROCEDURE — 81003 URINALYSIS AUTO W/O SCOPE: CPT | Performed by: EMERGENCY MEDICINE

## 2025-06-08 PROCEDURE — 2500000004 HC RX 250 GENERAL PHARMACY W/ HCPCS (ALT 636 FOR OP/ED): Performed by: EMERGENCY MEDICINE

## 2025-06-08 PROCEDURE — 81025 URINE PREGNANCY TEST: CPT | Performed by: EMERGENCY MEDICINE

## 2025-06-08 PROCEDURE — 85025 COMPLETE CBC W/AUTO DIFF WBC: CPT | Performed by: EMERGENCY MEDICINE

## 2025-06-08 PROCEDURE — 87077 CULTURE AEROBIC IDENTIFY: CPT | Mod: WESLAB | Performed by: EMERGENCY MEDICINE

## 2025-06-08 PROCEDURE — 99284 EMERGENCY DEPT VISIT MOD MDM: CPT | Mod: 25 | Performed by: EMERGENCY MEDICINE

## 2025-06-08 PROCEDURE — 36415 COLL VENOUS BLD VENIPUNCTURE: CPT | Performed by: EMERGENCY MEDICINE

## 2025-06-08 PROCEDURE — 96360 HYDRATION IV INFUSION INIT: CPT

## 2025-06-08 RX ORDER — NITROFURANTOIN 25; 75 MG/1; MG/1
100 CAPSULE ORAL 2 TIMES DAILY
Qty: 14 CAPSULE | Refills: 0 | Status: SHIPPED | OUTPATIENT
Start: 2025-06-08 | End: 2025-06-15

## 2025-06-08 RX ORDER — ACETAMINOPHEN 325 MG/1
975 TABLET ORAL ONCE
Status: DISCONTINUED | OUTPATIENT
Start: 2025-06-08 | End: 2025-06-08 | Stop reason: HOSPADM

## 2025-06-08 RX ADMIN — SODIUM CHLORIDE 500 ML: 900 INJECTION, SOLUTION INTRAVENOUS at 09:45

## 2025-06-08 ASSESSMENT — LIFESTYLE VARIABLES
EVER HAD A DRINK FIRST THING IN THE MORNING TO STEADY YOUR NERVES TO GET RID OF A HANGOVER: NO
TOTAL SCORE: 0
HAVE PEOPLE ANNOYED YOU BY CRITICIZING YOUR DRINKING: NO
EVER FELT BAD OR GUILTY ABOUT YOUR DRINKING: NO
HAVE YOU EVER FELT YOU SHOULD CUT DOWN ON YOUR DRINKING: NO

## 2025-06-08 ASSESSMENT — PAIN - FUNCTIONAL ASSESSMENT: PAIN_FUNCTIONAL_ASSESSMENT: 0-10

## 2025-06-08 ASSESSMENT — PAIN SCALES - GENERAL: PAINLEVEL_OUTOF10: 6

## 2025-06-08 NOTE — DISCHARGE INSTRUCTIONS
Please follow-up with urology and your primary care physician.  I did send in a prescription for Macrobid for you.  Ensure you are drinking plenty of fluids.  Return to the emergency department anytime if you have new or worsening symptoms with the onset of new symptoms.

## 2025-06-08 NOTE — PROGRESS NOTES
Attestation/Supervisory note for ARIK Mcneill      The patient is a 35-year-old female presenting to the emergency department for evaluation of suprapubic abdominal pain, malaise, and dysuria.  The patient states that she does have a ureteral stent in place because of obstruction due to a ureteral stone.  She states that she has been treated for urinary tract infection with Macrobid over the past 5 weeks but still has persistent symptoms.  She states that she does have a procedure (lithotripsy?) scheduled with urology in 3 days.  She states that she is 27 weeks pregnant.  She denies any headache or visual changes.  No chest pain or shortness of breath.  No fever or chills.  No focal weakness or numbness.  No vaginal discharge.  She has dysuria but no other urinary complaints.  No vaginal bleeding.  No diarrhea or constipation.  No nausea or vomiting.  No neck or back pain.  All pertinent positives and negatives are recorded above.  All other systems reviewed and otherwise negative.  Vital signs within normal limits.  Physical exam with a well-nourished well-developed female in no acute distress.  HEENT exam within normal limits.  She has no evidence of airway compromise or respiratory distress.  Abdominal exam with a gravid abdomen but otherwise is benign.  She does not have any gross motor, neurologic or vascular deficits on exam.  Pulses are equal bilaterally.  She is able to walk and stand without difficulty.  She is able to converse without difficulty.      Oral acetaminophen and IV fluids ordered.      Diagnostic labs with mild anemia, mild electrolyte imbalance, positive urine hCG and evidence of a urinary tract infection but otherwise unremarkable.      Renal ultrasound and pelvic ultrasound were ordered for further evaluation of patient's pain/symptoms and the patient initially agreed with the studies but then recanted her permission for the diagnostic imaging and stated that she did not want any diagnostic  imaging performed.      The patient is alert and coherent.  She does have the capacity make informed decision.  She verbalized understand of the risk of leaving AGAINST MEDICAL ADVICE including undiagnosed condition, worsening symptoms, permanent neurologic deficits, loss of pregnancy and even possible death.  She was treated with Macrobid for her urinary tract infection.  She agreed to follow-up with her primary care physician and/or OB/GYN within 1 to 2 days for further management of her current symptoms and repeat check of her blood pressure.  She was also instructed to follow-up with her urologist within 1 to 2 days for further management of her symptoms.  She will return to the emergency department sooner with worsening of symptoms or onset of new symptoms        Impression/diagnosis:  Suprapubic abdominal pain  Dysuria  Acute lower urinary tract infection      I personally saw the patient and made/approve the management plan and take responsibility for the patient management.      I independently interpreted the following study (S) diagnostic labs      I personally discussed the patient's management with the patient      Junie Denis MD

## 2025-06-08 NOTE — ED PROVIDER NOTES
HPI   Chief Complaint   Patient presents with    UTI       HPI  This is a 35-year-old female, 26 weeks pregnant presenting to the emergency department for evaluation of ongoing urinary tract infection.  Patient has had history of UTIs during pregnancy, recently was on a course of Macrobid x 7 days, finished this on 6/4/2025.  Patient states that dysuria, urgency and frequency resolved with Macrobid and returned shortly after patient finished the course of Macrobid.  She does have a stent placed for hydronephrosis, this was done by Dr. De Guzman.  No fevers or chills.  No vaginal discharge or bleeding.  Has had suprapubic discomfort.  Has had dysuria, urgency and frequency.    Please see HPI for pertinent positive and negative ROS.      Patient History   Medical History[1]  Surgical History[2]  Family History[3]  Social History[4]    Physical Exam   ED Triage Vitals [06/08/25 0930]   Temperature Heart Rate Respirations BP   36.7 °C (98.1 °F) 86 20 (!) 138/102      Pulse Ox Temp Source Heart Rate Source Patient Position   96 % Oral -- --      BP Location FiO2 (%)     -- --       Physical Exam  GENERAL APPEARANCE: Awake and alert. No acute respiratory distress.   VITAL SIGNS: As per the nurses' triage record.  HEENT: Normocephalic, atraumatic.   NECK: Soft, nontender and supple  CHEST: Nontender to palpation. Clear to auscultation bilaterally. Symmetric rise and fall of chest wall.   HEART: Clear S1 and S2. Regular rate and rhythm. Strong and equal pulses in the extremities.  ABDOMEN: Soft, gravid abdomen  MUSCULOSKELETAL: Full gross active range of motion. Ambulating on own with no acute difficulties  NEUROLOGICAL: Awake, alert and oriented x 3. Patient answering questions appropriately.   IMMUNOLOGICAL: No lymphatic streaking noted  DERMATOLOGIC: Warm and dry   PYSCH: Cooperative with appropriate mood and affect.    ED Course & MDM   ED Course as of 06/08/25 1129   Sun Jun 08, 2025   1007 Urine culture result from  6/4/2025 shows entericococcus faecalis, patient recently finished a course of nitrofurantoin which was less than or equal to 16 on susceptibility.  Ampicillin showed less than or equal to 2 on the susceptibility.  [SH]      ED Course User Index  [SH] Viviana Mcneill PA-C         Diagnoses as of 06/08/25 1129   Acute UTI (urinary tract infection)                 No data recorded     [place]alling Coma Scale Score: 15 (06/08/25 0926 : Steph Infante RN)                           Medical Decision Making  Parts of this chart have been completed using voice recognition software. Please excuse any errors of transcription.  My thought process and reason for plan has been formulated from the time that I saw the patient until the time of disposition and is not specific to one specific moment during their visit and furthermore my MDM encompasses this entire chart and not only this text box.      HPI: Detailed above.    Exam: A medically appropriate exam performed, outlined above, given the known history and presentation.    History obtained from:Patient     Medications given during visit:  Medications   acetaminophen (Tylenol) tablet 975 mg (975 mg oral Not Given 6/8/25 0959)   sodium chloride 0.9 % bolus 500 mL (0 mL intravenous Stopped 6/8/25 1030)        Diagnostic/tests  Labs Reviewed   HCG, URINE, QUALITATIVE - Abnormal       Result Value    HCG, Urine POSITIVE (*)    URINALYSIS WITH REFLEX CULTURE AND MICROSCOPIC - Abnormal    Color, Urine Light-Yellow      Appearance, Urine Turbid (*)     Specific Gravity, Urine 1.010      pH, Urine 7.5      Protein, Urine NEGATIVE      Glucose, Urine Normal      Blood, Urine 0.1 (1+) (*)     Ketones, Urine NEGATIVE      Bilirubin, Urine NEGATIVE      Urobilinogen, Urine Normal      Nitrite, Urine NEGATIVE      Leukocyte Esterase, Urine 500 Carlos/uL (*)    CBC WITH AUTO DIFFERENTIAL - Abnormal    WBC 7.6      nRBC 0.0      RBC 4.16      Hemoglobin 11.8 (*)     Hematocrit 35.2 (*)     MCV 85       MCH 28.4      MCHC 33.5      RDW 13.8      Platelets 264      Neutrophils % 73.2      Immature Granulocytes %, Automated 0.4      Lymphocytes % 19.7      Monocytes % 5.6      Eosinophils % 0.7      Basophils % 0.4      Neutrophils Absolute 5.58      Immature Granulocytes Absolute, Automated 0.03      Lymphocytes Absolute 1.50      Monocytes Absolute 0.43      Eosinophils Absolute 0.05      Basophils Absolute 0.03     COMPREHENSIVE METABOLIC PANEL - Abnormal    Glucose 94      Sodium 132 (*)     Potassium 4.3      Chloride 102      Bicarbonate 21      Anion Gap 13      Urea Nitrogen 10      Creatinine 0.91      eGFR 85      Calcium 9.1      Albumin 3.9      Alkaline Phosphatase 71      Total Protein 7.0      AST 12      Bilirubin, Total 0.3      ALT 12     MICROSCOPIC ONLY, URINE - Abnormal    WBC, Urine >50 (*)     WBC Clumps, Urine MANY      RBC, Urine >20 (*)     Squamous Epithelial Cells, Urine 1-9 (SPARSE)      Bacteria, Urine 1+ (*)    URINE CULTURE   URINALYSIS WITH REFLEX CULTURE AND MICROSCOPIC    Narrative:     The following orders were created for panel order Urinalysis with Reflex Culture and Microscopic.  Procedure                               Abnormality         Status                     ---------                               -----------         ------                     Urinalysis with Reflex C...[[id number]]  Abnormal            Final result               Extra Urine Gray Tube[[id number]]                            In process                   Please view results for these tests on the individual orders.   EXTRA URINE GRAY TUBE      No orders to display        Considerations/further MDM:  Patient was seen in conjucntion with my supervising physician,  Dr. Denis. Please refer to her note.    Patient presents for irritative urinary symptoms, differential diagnosis includes but not limited to UTI versus pyelonephritis versus ureterolithiasis versus worsening hydronephrosis    Labs reveal UTI.  No  leukocytosis.  Mild hyponatremia with a sodium of 132.  Patient did not want renal ultrasound and pelvis ultrasound.  She wanted to leave AGAINST MEDICAL ADVICE after her labs are finished.  I did educate patient that she needs to follow-up closely with her urologist and OB/GYN.  I did send in a prescription for Macrobid.  Return precautions were discussed.  I did put in a stat referral to urology.  Patient verbalized understanding,      Procedure  Procedures       [1]   Past Medical History:  Diagnosis Date    Encounter for full-term uncomplicated delivery      (spontaneous vaginal delivery)    Encounter for supervision of normal pregnancy, unspecified, first trimester     Encounter for pregnancy related examination in first trimester    Encounter for supervision of normal pregnancy, unspecified, first trimester     Encounter for pregnancy related examination in first trimester    Other specified health status     No pertinent past medical history    Other specified health status 10/10/2019    No pertinent past surgical history    PCOS (polycystic ovarian syndrome)     Personal history of urinary calculi     History of kidney stones    Unspecified infection of urinary tract in pregnancy, second trimester ([facility]) 2019    UTI (urinary tract infection) in pregnancy in second trimester   [2]   Past Surgical History:  Procedure Laterality Date    OTHER SURGICAL HISTORY  2020    Oral surgery    OTHER SURGICAL HISTORY  06/10/2019    Repair of bladder    WISDOM TOOTH EXTRACTION     [3]   Family History  Problem Relation Name Age of Onset    Other (Cardiac Disorder) Mother      Hypothyroidism Mother      Breast cancer Mother      Uterine cancer Mother      Heart murmur Son      Other (LSVC (Persistent left superior vena cava)) Son      Other (LSVC (persistent left suprior vena cava)) Other Child    [4]   Social History  Tobacco Use    Smoking status: Never    Smokeless tobacco: Never   Vaping Use    Vaping  status: Never Used   Substance Use Topics    Alcohol use: Never    Drug use: Never        Viviana Mcneill PA-C  06/08/25 1126

## 2025-06-10 ENCOUNTER — ANESTHESIA EVENT (OUTPATIENT)
Dept: OPERATING ROOM | Facility: HOSPITAL | Age: 35
End: 2025-06-10

## 2025-06-11 ENCOUNTER — ANESTHESIA (OUTPATIENT)
Dept: OPERATING ROOM | Facility: HOSPITAL | Age: 35
End: 2025-06-11
Payer: MEDICAID

## 2025-06-11 LAB — BACTERIA UR CULT: ABNORMAL

## 2025-06-12 ENCOUNTER — TELEPHONE (OUTPATIENT)
Dept: PHARMACY | Facility: HOSPITAL | Age: 35
End: 2025-06-12
Payer: MEDICAID

## 2025-06-12 NOTE — PROGRESS NOTES
EDPD Note: Antibiotics Reviewed and Warranted    Contacted Mr./Mrs./Ms. Breanna Jackson regarding a positive urine culture/result that was taken during their recent emergency room visit. I completed education with patient . The patient is being treated appropriately with macrobid 100 mg BID x 7 days, which is appropriate based on susceptibility..    Patient presented to the ED due to ongoing UTI in pregnancy. Experiencing dysuria, urgency,and frequency. Has stent in place and follows with urology.  Scheduled for cytoscopy with urology. Recommended finishing course of abx as prescribed and continuing to follow up with OBGYN and urology. Patient verbalized understanding.    Susceptibility data from last 90 days.  Collected Specimen Info Organism Ampicillin Ciprofloxacin Levofloxacin Nitrofurantoin Penicillin Trimethoprim/Sulfamethoxazole Vancomycin   06/08/25 Urine from Clean Catch/Voided Enterococcus faecalis  S  S  S  S  S  R  S   05/27/25 Urine from Clean Catch/Voided Enterococcus faecalis  S  S  S  S  S  R  S        No further follow up needed from EDPD Team.     Lillian Mendez, PharmD

## 2025-06-16 ENCOUNTER — APPOINTMENT (OUTPATIENT)
Dept: UROLOGY | Facility: CLINIC | Age: 35
End: 2025-06-16
Payer: MEDICAID

## 2025-06-16 DIAGNOSIS — R82.90 ABNORMAL URINALYSIS: ICD-10-CM

## 2025-06-16 DIAGNOSIS — N39.0 ACUTE UTI (URINARY TRACT INFECTION): ICD-10-CM

## 2025-06-16 DIAGNOSIS — N12 PYELONEPHRITIS: ICD-10-CM

## 2025-06-16 LAB
POC APPEARANCE, URINE: ABNORMAL
POC BILIRUBIN, URINE: NEGATIVE
POC BLOOD, URINE: ABNORMAL
POC COLOR, URINE: YELLOW
POC GLUCOSE, URINE: NEGATIVE MG/DL
POC KETONES, URINE: NEGATIVE MG/DL
POC LEUKOCYTES, URINE: ABNORMAL
POC NITRITE,URINE: NEGATIVE
POC PH, URINE: 7 PH
POC PROTEIN, URINE: ABNORMAL MG/DL
POC SPECIFIC GRAVITY, URINE: 1.01
POC UROBILINOGEN, URINE: 0.2 EU/DL

## 2025-06-16 PROCEDURE — 99204 OFFICE O/P NEW MOD 45 MIN: CPT | Performed by: STUDENT IN AN ORGANIZED HEALTH CARE EDUCATION/TRAINING PROGRAM

## 2025-06-16 PROCEDURE — 1036F TOBACCO NON-USER: CPT | Performed by: STUDENT IN AN ORGANIZED HEALTH CARE EDUCATION/TRAINING PROGRAM

## 2025-06-16 PROCEDURE — 81002 URINALYSIS NONAUTO W/O SCOPE: CPT | Performed by: STUDENT IN AN ORGANIZED HEALTH CARE EDUCATION/TRAINING PROGRAM

## 2025-06-16 RX ORDER — NITROFURANTOIN 25; 75 MG/1; MG/1
100 CAPSULE ORAL 2 TIMES DAILY
Qty: 14 CAPSULE | Refills: 0 | Status: SHIPPED | OUTPATIENT
Start: 2025-06-16 | End: 2025-06-23

## 2025-06-16 NOTE — H&P (VIEW-ONLY)
Scribed for Dr. Maverick Seaman by Antonio Bailon. I, Dr. Maverick Seaman have personally reviewed and agreed with the information entered by the Virtual Scribe. 06/16/25.    ASSESSMENT:  Problem List Items Addressed This Visit    None  Visit Diagnoses         Pyelonephritis        Relevant Orders    POCT UA (nonautomated) manually resulted (Completed)      Acute UTI (urinary tract infection)        Relevant Medications    nitrofurantoin, macrocrystal-monohydrate, (Macrobid) 100 mg capsule      Abnormal urinalysis        Relevant Orders    Urine Culture           PLAN:  #Ureteral calculi, left   #Ureteral stent, left  #Nephrolithiasis  #Pregnancy, ~ 27 wks gestation  Discussed non-surgical vs surgical options.   Elects to proceed with left ULLS (stent exchange w/ [id number]).   Risks, benefits and complications discussed.   Post-op course and expectations reviewed.   Her  is unable to get off work to help her post-op.   She does not have any immediate family locally to help her post-op.   She will call if she is unable to find assistance and cancel the surgery.   Otherwise, will proceed with surgery; tentative OR date of June 20th, 2025.     Discussion:  We discussed that most calculi under 5 mm can be safely observed. This recommendation is based on large series looking at spontaneous passage rates that suggest that the likelihood of stone passage is highest for stones under 4 mm size (approximately 70-80%), moderate for stones between 4 and 6 mm (50%), and lowest for stones 6 mm or greater (20-30%).    I discussed with the patient the management options for kidney and ureteral stones, including observation, medical expulsive therapy, stent placement, rigid or flexible ureteroscopy with stone basket or laser stone fragmentation, ESWL; as well as advanced options for larger stones such as percutaneous and surgical approaches.    Ureteroscopy:  We discussed ureteroscopy laser lithotripsy with stent  placement. The patient has been informed that this procedure has a high success rate for stones located within the ureter that are medium to small size (up to 1.2 cm) but has a lower stone free rate compared to percutaneous removal for large stones located in the upper ureter or kidney. It especially has a low stone free rate for medium or large stones located in the lower pole compared to percutaneous treatment.      Advantages of this procedure include its ability to be performed in an outpatient setting and its minimally invasive nature. Given the particulars of the stone, size, location, and composition, I believe that ureteroscopy and laser lithotripsy with stone extraction is a viable treatment alternative for this patient. I discussed the risks, benefits, alternatives and complications associated with ureteroscopy with laser lithotripsy and stone extraction, including but not limited to ureteral perforation, extravasation, stricture formation, bleeding, sepsis, obstruction, inability to reach the stone, inability to fragment the stone, and inability to retrieve all stone fragments.     The need for ancillary procedures such as stent placement and removal, retrograde urography, and percutaneous nephrostomy were also discussed, and the patient was given the opportunity to ask any questions. All questions were answered to his satisfaction. The patient understands that all anti-coagulation including platelet inhibitors must be discontinued several days prior to the procedure unless other arrangements are made in conjunction with me and the patient's cardiologist or internist.      The patient also understood that a ureteral stent would likely be placed and removal of the stent is critical. Failure to follow up for stent removal can result in recurrent UTIs, encrustation of the stent, loss of kidney function and need for nephrectomy.    Stone prevention:  Patient was educated on stone prevention dietary  "modifications which include increased water intake, citric acid supplementation in the form of lemon juice, low oxalate diet, moderate protein intake and low sodium intake. In addition, suggested avoiding dark-colored sodas. A daily urine output of 2.5 L is also recommended if feasible.     All questions were answered to the patient’s satisfaction.  Patient agrees with the plan and wishes to proceed.  Continue follow-up for ongoing care of his chronic medical conditions.       History of Present Illness (HPI):  Breanna (\"Netta\") presents as a new patient for an evaluation.  The patient’s EMR has been reviewed.  Lives in [place], [place].     Hx of nephrolithiasis, hydronephrosis s/p left ureteral stent placement.  She is currently about 27 weeks pregnant.  Previously followed by Dr. Malhotra. Hx of an obstructing left proximal ureteral stone (~3-4 mm) with hydronephrosis, managed with left ureteral stent placement with Dr. De Guzman (Jan 2025), with plan to treat her stone after she delivers. Last stent exchange was (04/09/25). She pregnancy course has been c/b recurrent UTI's. Last treated for Enterococcus UTI (06/08/25).     TODAY: (06/16/25)  Reports she has been doing well overall.   Completed course of antibiotics yesterday for her Enterococcus UTI.   Denies any bothersome stent-related symptoms.   No acute or worsening complaints at this time.     Renal ultrasound (02/26/25) reviewed.   Bilateral nephroliths again detected. Hepatic steatosis noted. Body  habitus does limit sensitivity. Left-sided ureteral stent appears to  be decompressing in the left collecting system. Hydronephrosis has  improved.    CT A&P (01/26/25) personally reviewed and independently interpreted.  Proximal left obstructing ureteral stone. (3-4 mm)  Bilateral non-obstructing intrarenal calculi.   Has five intrarenal calculi within her R kidney (~ 6 mm).  Has numerous punctate intrarenal calculi within her L kidney.     PMH: nephrolithiasis, " PCOS, Trace's  PSH: wisdom tooth removal.  FH: Uterine cancer (Mother), breast cancer (Mother)  SH: Non-smoker, never smoked.     Medical History[1]  Surgical History[2]  Family History[3]  Tobacco Use History[4]  Current Medications[5]  Allergies[6]  Past medical, surgical, family and social history in the chart was reviewed and is accurate including any additions to what is in this HPI.    REVIEW OF SYSTEMS (ROS):   Constitutional: denies any unintentional weight loss or change in strength.  Integumentary: denies any rashes or pruritus.  Eyes: denies any double vision or eye pain.  Ear/Nose/Mouth/Throat: denies any nosebleeds or gum bleeds.  Cardiovascular: denies any chest pain or syncope.  Respiratory: denies hemoptysis.  Gastrointestinal: denies nausea or vomiting.  Musculoskeletal: denies muscle cramping or weakness.  Neurologic: denies convulsions or seizures.  Hematologic/Lymphatic: denies bleeding tendencies.  Endocrine: denies heat/cold intolerance.  All other systems have been reviewed and are negative unless otherwise noted in the HPI.     OBJECTIVE:  There were no vitals taken for this visit.  PHYSICAL EXAM:  Constitutional: No obvious distress.  Eyes: Non-injected conjunctiva, sclera clear, EOMI.  Ears/Nose/Mouth/Throat: No obvious drainage per ears or nose.  Cardiovascular: Extremities are warm and well perfused. No edema, cyanosis or pallor.  Respiratory: No audible wheezing/stridor; respirations do not appear labored.  Gastrointestinal: Abdomen soft, not distended.  Musculoskeletal: Normal ROM of extremities.  Skin: No obvious rashes or open sores.  Neurologic: Alert and oriented, CN 2-12 grossly intact.  Psychiatric: Answers questions appropriately with normal affect.  Hematologic/Lymphatic/Immunologic: No obvious bruises or sites of spontaneous bleeding.  Genitourinary: No CVA tenderness, bladder not palpable.     LABS & IMAGING:  Basic Labs:  Lab Results   Component Value Date    WBC 7.6  2025    HGB 11.8 (L) 2025    HCT 35.2 (L) 2025     2025     (L) 2025    K 4.3 2025     2025    ALT 12 2025    AST 12 2025    CREATININE 0.91 2025    BUN 10 2025    CO2 21 2025    TSH 2.65 2024    INR 0.9 2018       Scribed for Dr. Maverick Seaman by Antonio Bailon.  By signing my name below, I, Hai Jasso attest that this documentation has been prepared under the direction and in the presence of Maverick Seaman MD. All medical record entries made by the Scribe were at my direction or personally dictated by me. I have reviewed the chart and agree that the record accurately reflects my personal performance of the history, physical exam, discussion and plan.           [1]   Past Medical History:  Diagnosis Date    Encounter for full-term uncomplicated delivery      (spontaneous vaginal delivery)    Encounter for supervision of normal pregnancy, unspecified, first trimester     Encounter for pregnancy related examination in first trimester    Encounter for supervision of normal pregnancy, unspecified, first trimester     Encounter for pregnancy related examination in first trimester    Other specified health status     No pertinent past medical history    Other specified health status 10/10/2019    No pertinent past surgical history    PCOS (polycystic ovarian syndrome)     Personal history of urinary calculi     History of kidney stones    Unspecified infection of urinary tract in pregnancy, second trimester ([facility]) 2019    UTI (urinary tract infection) in pregnancy in second trimester   [2]   Past Surgical History:  Procedure Laterality Date    OTHER SURGICAL HISTORY  2020    Oral surgery    OTHER SURGICAL HISTORY  06/10/2019    Repair of bladder    WISDOM TOOTH EXTRACTION     [3]   Family History  Problem Relation Name Age of Onset    Other (Cardiac Disorder) Mother      Hypothyroidism Mother       Breast cancer Mother      Uterine cancer Mother      Heart murmur Son      Other (LSVC (Persistent left superior vena cava)) Son      Other (LSVC (persistent left suprior vena cava)) Other Child    [4]   Social History  Tobacco Use   Smoking Status Never   Smokeless Tobacco Never   [5]   Current Outpatient Medications   Medication Sig Dispense Refill    aspirin 81 mg EC tablet Take 1 tablet (81 mg) by mouth once daily.      ondansetron ODT (Zofran-ODT) 4 mg disintegrating tablet DISSOLVE 1 TABLET IN MOUTH EVERY 8 HOURS AS NEEDED FOR NAUSEA FOR VOMITING 30 tablet 0    oxymetazoline (Afrin) 0.05 % nasal spray Administer 2 sprays into each nostril every 12 hours if needed for congestion for up to 2 days. Do not use for more than 3 days. 30 mL 0    prenatal no115/iron/folic acid (PRENATAL 19 ORAL) Take by mouth.       No current facility-administered medications for this visit.   [6]   Allergies  Allergen Reactions    Amoxicillin Anaphylaxis and Hives    Lactose Diarrhea    Penicillins Anaphylaxis and Hives    Prednisone Anaphylaxis and Hives    Raspberry Anaphylaxis    Wheat Hives    Latex Hives

## 2025-06-16 NOTE — PROGRESS NOTES
Scribed for Dr. Maverick Seaman by Antonio Bailon. I, Dr. Maverick Seaman have personally reviewed and agreed with the information entered by the Virtual Scribe. 06/16/25.    ASSESSMENT:  Problem List Items Addressed This Visit    None  Visit Diagnoses         Pyelonephritis        Relevant Orders    POCT UA (nonautomated) manually resulted (Completed)      Acute UTI (urinary tract infection)        Relevant Medications    nitrofurantoin, macrocrystal-monohydrate, (Macrobid) 100 mg capsule      Abnormal urinalysis        Relevant Orders    Urine Culture           PLAN:  #Ureteral calculi, left   #Ureteral stent, left  #Nephrolithiasis  #Pregnancy, ~ 27 wks gestation  Discussed non-surgical vs surgical options.   Elects to proceed with left ULLS (stent exchange w/ [id number]).   Risks, benefits and complications discussed.   Post-op course and expectations reviewed.   Her  is unable to get off work to help her post-op.   She does not have any immediate family locally to help her post-op.   She will call if she is unable to find assistance and cancel the surgery.   Otherwise, will proceed with surgery; tentative OR date of June 20th, 2025.     Discussion:  We discussed that most calculi under 5 mm can be safely observed. This recommendation is based on large series looking at spontaneous passage rates that suggest that the likelihood of stone passage is highest for stones under 4 mm size (approximately 70-80%), moderate for stones between 4 and 6 mm (50%), and lowest for stones 6 mm or greater (20-30%).    I discussed with the patient the management options for kidney and ureteral stones, including observation, medical expulsive therapy, stent placement, rigid or flexible ureteroscopy with stone basket or laser stone fragmentation, ESWL; as well as advanced options for larger stones such as percutaneous and surgical approaches.    Ureteroscopy:  We discussed ureteroscopy laser lithotripsy with stent  placement. The patient has been informed that this procedure has a high success rate for stones located within the ureter that are medium to small size (up to 1.2 cm) but has a lower stone free rate compared to percutaneous removal for large stones located in the upper ureter or kidney. It especially has a low stone free rate for medium or large stones located in the lower pole compared to percutaneous treatment.      Advantages of this procedure include its ability to be performed in an outpatient setting and its minimally invasive nature. Given the particulars of the stone, size, location, and composition, I believe that ureteroscopy and laser lithotripsy with stone extraction is a viable treatment alternative for this patient. I discussed the risks, benefits, alternatives and complications associated with ureteroscopy with laser lithotripsy and stone extraction, including but not limited to ureteral perforation, extravasation, stricture formation, bleeding, sepsis, obstruction, inability to reach the stone, inability to fragment the stone, and inability to retrieve all stone fragments.     The need for ancillary procedures such as stent placement and removal, retrograde urography, and percutaneous nephrostomy were also discussed, and the patient was given the opportunity to ask any questions. All questions were answered to his satisfaction. The patient understands that all anti-coagulation including platelet inhibitors must be discontinued several days prior to the procedure unless other arrangements are made in conjunction with me and the patient's cardiologist or internist.      The patient also understood that a ureteral stent would likely be placed and removal of the stent is critical. Failure to follow up for stent removal can result in recurrent UTIs, encrustation of the stent, loss of kidney function and need for nephrectomy.    Stone prevention:  Patient was educated on stone prevention dietary  "modifications which include increased water intake, citric acid supplementation in the form of lemon juice, low oxalate diet, moderate protein intake and low sodium intake. In addition, suggested avoiding dark-colored sodas. A daily urine output of 2.5 L is also recommended if feasible.     All questions were answered to the patient’s satisfaction.  Patient agrees with the plan and wishes to proceed.  Continue follow-up for ongoing care of his chronic medical conditions.       History of Present Illness (HPI):  Breanna (\"Netta\") presents as a new patient for an evaluation.  The patient’s EMR has been reviewed.  Lives in [place], [place].     Hx of nephrolithiasis, hydronephrosis s/p left ureteral stent placement.  She is currently about 27 weeks pregnant.  Previously followed by Dr. Malhotra. Hx of an obstructing left proximal ureteral stone (~3-4 mm) with hydronephrosis, managed with left ureteral stent placement with Dr. De Guzman (Jan 2025), with plan to treat her stone after she delivers. Last stent exchange was (04/09/25). She pregnancy course has been c/b recurrent UTI's. Last treated for Enterococcus UTI (06/08/25).     TODAY: (06/16/25)  Reports she has been doing well overall.   Completed course of antibiotics yesterday for her Enterococcus UTI.   Denies any bothersome stent-related symptoms.   No acute or worsening complaints at this time.     Renal ultrasound (02/26/25) reviewed.   Bilateral nephroliths again detected. Hepatic steatosis noted. Body  habitus does limit sensitivity. Left-sided ureteral stent appears to  be decompressing in the left collecting system. Hydronephrosis has  improved.    CT A&P (01/26/25) personally reviewed and independently interpreted.  Proximal left obstructing ureteral stone. (3-4 mm)  Bilateral non-obstructing intrarenal calculi.   Has five intrarenal calculi within her R kidney (~ 6 mm).  Has numerous punctate intrarenal calculi within her L kidney.     PMH: nephrolithiasis, " PCOS, Trace's  PSH: wisdom tooth removal.  FH: Uterine cancer (Mother), breast cancer (Mother)  SH: Non-smoker, never smoked.     Medical History[1]  Surgical History[2]  Family History[3]  Tobacco Use History[4]  Current Medications[5]  Allergies[6]  Past medical, surgical, family and social history in the chart was reviewed and is accurate including any additions to what is in this HPI.    REVIEW OF SYSTEMS (ROS):   Constitutional: denies any unintentional weight loss or change in strength.  Integumentary: denies any rashes or pruritus.  Eyes: denies any double vision or eye pain.  Ear/Nose/Mouth/Throat: denies any nosebleeds or gum bleeds.  Cardiovascular: denies any chest pain or syncope.  Respiratory: denies hemoptysis.  Gastrointestinal: denies nausea or vomiting.  Musculoskeletal: denies muscle cramping or weakness.  Neurologic: denies convulsions or seizures.  Hematologic/Lymphatic: denies bleeding tendencies.  Endocrine: denies heat/cold intolerance.  All other systems have been reviewed and are negative unless otherwise noted in the HPI.     OBJECTIVE:  There were no vitals taken for this visit.  PHYSICAL EXAM:  Constitutional: No obvious distress.  Eyes: Non-injected conjunctiva, sclera clear, EOMI.  Ears/Nose/Mouth/Throat: No obvious drainage per ears or nose.  Cardiovascular: Extremities are warm and well perfused. No edema, cyanosis or pallor.  Respiratory: No audible wheezing/stridor; respirations do not appear labored.  Gastrointestinal: Abdomen soft, not distended.  Musculoskeletal: Normal ROM of extremities.  Skin: No obvious rashes or open sores.  Neurologic: Alert and oriented, CN 2-12 grossly intact.  Psychiatric: Answers questions appropriately with normal affect.  Hematologic/Lymphatic/Immunologic: No obvious bruises or sites of spontaneous bleeding.  Genitourinary: No CVA tenderness, bladder not palpable.     LABS & IMAGING:  Basic Labs:  Lab Results   Component Value Date    WBC 7.6  2025    HGB 11.8 (L) 2025    HCT 35.2 (L) 2025     2025     (L) 2025    K 4.3 2025     2025    ALT 12 2025    AST 12 2025    CREATININE 0.91 2025    BUN 10 2025    CO2 21 2025    TSH 2.65 2024    INR 0.9 2018       Scribed for Dr. Maverick Seaman by Antonio Bailon.  By signing my name below, I, Hai Jasso attest that this documentation has been prepared under the direction and in the presence of Maverick Seaman MD. All medical record entries made by the Scribe were at my direction or personally dictated by me. I have reviewed the chart and agree that the record accurately reflects my personal performance of the history, physical exam, discussion and plan.           [1]   Past Medical History:  Diagnosis Date    Encounter for full-term uncomplicated delivery      (spontaneous vaginal delivery)    Encounter for supervision of normal pregnancy, unspecified, first trimester     Encounter for pregnancy related examination in first trimester    Encounter for supervision of normal pregnancy, unspecified, first trimester     Encounter for pregnancy related examination in first trimester    Other specified health status     No pertinent past medical history    Other specified health status 10/10/2019    No pertinent past surgical history    PCOS (polycystic ovarian syndrome)     Personal history of urinary calculi     History of kidney stones    Unspecified infection of urinary tract in pregnancy, second trimester ([facility]) 2019    UTI (urinary tract infection) in pregnancy in second trimester   [2]   Past Surgical History:  Procedure Laterality Date    OTHER SURGICAL HISTORY  2020    Oral surgery    OTHER SURGICAL HISTORY  06/10/2019    Repair of bladder    WISDOM TOOTH EXTRACTION     [3]   Family History  Problem Relation Name Age of Onset    Other (Cardiac Disorder) Mother      Hypothyroidism Mother       Breast cancer Mother      Uterine cancer Mother      Heart murmur Son      Other (LSVC (Persistent left superior vena cava)) Son      Other (LSVC (persistent left suprior vena cava)) Other Child    [4]   Social History  Tobacco Use   Smoking Status Never   Smokeless Tobacco Never   [5]   Current Outpatient Medications   Medication Sig Dispense Refill    aspirin 81 mg EC tablet Take 1 tablet (81 mg) by mouth once daily.      ondansetron ODT (Zofran-ODT) 4 mg disintegrating tablet DISSOLVE 1 TABLET IN MOUTH EVERY 8 HOURS AS NEEDED FOR NAUSEA FOR VOMITING 30 tablet 0    oxymetazoline (Afrin) 0.05 % nasal spray Administer 2 sprays into each nostril every 12 hours if needed for congestion for up to 2 days. Do not use for more than 3 days. 30 mL 0    prenatal no115/iron/folic acid (PRENATAL 19 ORAL) Take by mouth.       No current facility-administered medications for this visit.   [6]   Allergies  Allergen Reactions    Amoxicillin Anaphylaxis and Hives    Lactose Diarrhea    Penicillins Anaphylaxis and Hives    Prednisone Anaphylaxis and Hives    Raspberry Anaphylaxis    Wheat Hives    Latex Hives

## 2025-06-18 ENCOUNTER — PRE-ADMISSION TESTING (OUTPATIENT)
Dept: PREADMISSION TESTING | Facility: HOSPITAL | Age: 35
End: 2025-06-18
Payer: MEDICAID

## 2025-06-18 VITALS
HEART RATE: 74 BPM | DIASTOLIC BLOOD PRESSURE: 84 MMHG | WEIGHT: 193.3 LBS | BODY MASS INDEX: 35.57 KG/M2 | HEIGHT: 62 IN | SYSTOLIC BLOOD PRESSURE: 118 MMHG | OXYGEN SATURATION: 97 % | TEMPERATURE: 98.3 F

## 2025-06-18 PROCEDURE — 99244 OFF/OP CNSLTJ NEW/EST MOD 40: CPT | Performed by: NURSE PRACTITIONER

## 2025-06-18 ASSESSMENT — DUKE ACTIVITY SCORE INDEX (DASI)
CAN YOU WALK A BLOCK OR TWO ON LEVEL GROUND: YES
CAN YOU CLIMB A FLIGHT OF STAIRS OR WALK UP A HILL: YES
CAN YOU DO HEAVY WORK AROUND THE HOUSE LIKE SCRUBBING FLOORS OR LIFTING AND MOVING HEAVY FURNITURE: YES
DASI METS SCORE: 9.9
CAN YOU PARTICIPATE IN STRENOUS SPORTS LIKE SWIMMING, SINGLES TENNIS, FOOTBALL, BASKETBALL, OR SKIING: YES
CAN YOU HAVE SEXUAL RELATIONS: YES
CAN YOU DO YARD WORK LIKE RAKING LEAVES, WEEDING OR PUSHING A MOWER: YES
CAN YOU RUN A SHORT DISTANCE: YES
CAN YOU TAKE CARE OF YOURSELF (EAT, DRESS, BATHE, OR USE TOILET): YES
CAN YOU WALK INDOORS, SUCH AS AROUND YOUR HOUSE: YES
TOTAL_SCORE: 58.2
CAN YOU DO LIGHT WORK AROUND THE HOUSE LIKE DUSTING OR WASHING DISHES: YES
CAN YOU PARTICIPATE IN MODERATE RECREATIONAL ACTIVITIES LIKE GOLF, BOWLING, DANCING, DOUBLES TENNIS OR THROWING A BASEBALL OR FOOTBALL: YES
CAN YOU DO MODERATE WORK AROUND THE HOUSE LIKE VACUUMING, SWEEPING FLOORS OR CARRYING GROCERIES: YES

## 2025-06-18 ASSESSMENT — ENCOUNTER SYMPTOMS
MUSCULOSKELETAL NEGATIVE: 1
VOMITING: 1
CONSTITUTIONAL NEGATIVE: 1
EYES NEGATIVE: 1
ENDOCRINE NEGATIVE: 1
DYSURIA: 1
NEUROLOGICAL NEGATIVE: 1
RESPIRATORY NEGATIVE: 1
NECK NEGATIVE: 1
NAUSEA: 1
CARDIOVASCULAR NEGATIVE: 1

## 2025-06-18 ASSESSMENT — LIFESTYLE VARIABLES: SMOKING_STATUS: NONSMOKER

## 2025-06-18 NOTE — H&P (VIEW-ONLY)
"CPM/PAT Evaluation       Name: Breanna Jackson (Breanna Jackson \"Netta\")  /Age: 1990/35 y.o.     Visit Type:   In-Person       Chief Complaint: perioperative evaluation      HPI  The patient is a 35 year old female with history of nephrolithiasis, hydronephrosis s/p left ureteral stent placement 2025. She is currently 28 weeks and 4 days pregnant. Last stent exchange was 25. Her pregnancy course has been complicated by UTIs, currently being treated with antibiotics. She is referred today by Dr. Maverick Seaman for perioperative evaluation in anticipation of cystoscopy with laser lithotripsy-left, stent removal on 25.    Medical History[1]    Surgical History[2]    Patient Sexual activity questions deferred to the physician.    Family History[3]    Allergies[4]    Prior to Admission medications    Medication Sig Start Date End Date Taking? Authorizing Provider   aspirin 81 mg EC tablet Take 1 tablet (81 mg) by mouth once daily.    Historical Provider, MD   nitrofurantoin, macrocrystal-monohydrate, (Macrobid) 100 mg capsule Take 1 capsule (100 mg) by mouth 2 times a day for 7 days. 25  Maverick Seaman MD   ondansetron ODT (Zofran-ODT) 4 mg disintegrating tablet DISSOLVE 1 TABLET IN MOUTH EVERY 8 HOURS AS NEEDED FOR NAUSEA FOR VOMITING 25   Slime Morris MD   prenatal no115/iron/folic acid (PRENATAL 19 ORAL) Take by mouth.    Historical Provider, MD   nitrofurantoin, macrocrystal-monohydrate, (Macrobid) 100 mg capsule Take 1 capsule (100 mg) by mouth 2 times a day for 7 days. 25  Viviana Mcneill PA-C   oxymetazoline (Afrin) 0.05 % nasal spray Administer 2 sprays into each nostril every 12 hours if needed for congestion for up to 2 days. Do not use for more than 3 days. 25  AMANDO Franz ROS:   Constitutional:   neg    Neuro/Psych:   neg    Eyes:   neg    Ears:   neg    Nose:   neg    Mouth:   neg    Throat:   neg    Neck:   neg    Cardio:   neg  " "  Respiratory:   neg    Endocrine:   neg    GI:    nausea   vomiting  :    dysuria  Musculoskeletal:   neg    Hematologic:   neg    Skin:  neg        Physical Exam  Vitals reviewed.   Constitutional:       Appearance: Normal appearance.   HENT:      Head: Normocephalic and atraumatic.      Nose: Nose normal.      Mouth/Throat:      Mouth: Mucous membranes are moist.      Pharynx: Oropharynx is clear.   Eyes:      Extraocular Movements: Extraocular movements intact.      Conjunctiva/sclera: Conjunctivae normal.      Pupils: Pupils are equal, round, and reactive to light.   Cardiovascular:      Rate and Rhythm: Normal rate and regular rhythm.      Pulses: Normal pulses.      Heart sounds: Normal heart sounds.   Pulmonary:      Effort: Pulmonary effort is normal.      Breath sounds: Normal breath sounds.   Musculoskeletal:         General: Normal range of motion.      Cervical back: Normal range of motion.   Skin:     General: Skin is warm and dry.   Neurological:      General: No focal deficit present.      Mental Status: She is alert and oriented to person, place, and time.   Psychiatric:         Mood and Affect: Mood normal.         Behavior: Behavior normal.          PAT AIRWAY:   Airway:     Mallampati::  IV    TM distance::  >3 FB    Neck ROM::  Full  normal          Visit Vitals  /84   Pulse 74   Temp 36.8 °C (98.3 °F)   Ht 1.575 m (5' 2\")   Wt 87.7 kg (193 lb 4.8 oz)   LMP 11/23/2024   SpO2 97%   BMI 35.36 kg/m²   OB Status Pregnant   Smoking Status Never   BSA 1.96 m²       DASI Risk Score      Flowsheet Row Pre-Admission Testing from 6/18/2025 in [facility] Questionnaire Series Submission from 6/5/2025 in [facility] with Generic Provider Yue   Can you take care of yourself (eat, dress, bathe, or use toilet)?  2.75 filed at 06/18/2025 0752 2.75  filed at 06/05/2025 2109   Can you walk indoors, such as around your house? 1.75 filed at 06/18/2025 0752 1.75  filed " at 06/05/2025 2109   Can you walk a block or two on level ground?  2.75 filed at 06/18/2025 0752 2.75  filed at 06/05/2025 2109   Can you climb a flight of stairs or walk up a hill? 5.5 filed at 06/18/2025 0752 5.5  filed at 06/05/2025 2109   Can you run a short distance? 8 filed at 06/18/2025 0752 8  filed at 06/05/2025 2109   Can you do light work around the house like dusting or washing dishes? 2.7 filed at 06/18/2025 0752 2.7  filed at 06/05/2025 2109   Can you do moderate work around the house like vacuuming, sweeping floors or carrying groceries? 3.5 filed at 06/18/2025 0752 3.5  filed at 06/05/2025 2109   Can you do heavy work around the house like scrubbing floors or lifting and moving heavy furniture?  8 filed at 06/18/2025 0752 0  filed at 06/05/2025 2109   Can you do yard work like raking leaves, weeding or pushing a mower? 4.5 filed at 06/18/2025 0752 0  filed at 06/05/2025 2109   Can you have sexual relations? 5.25 filed at 06/18/2025 0752 5.25  filed at 06/05/2025 2109   Can you participate in moderate recreational activities like golf, bowling, dancing, doubles tennis or throwing a baseball or football? 6 filed at 06/18/2025 0752 6  filed at 06/05/2025 2109   Can you participate in strenous sports like swimming, singles tennis, football, basketball, or skiing? 7.5 filed at 06/18/2025 0752 0  filed at 06/05/2025 2109   DASI SCORE 58.2 filed at 06/18/2025 0752 38.2  filed at 06/05/2025 2109   METS Score (Will be calculated only when all the questions are answered) 9.9 filed at 06/18/2025 0752 7.4  filed at 06/05/2025 2109          Caprini DVT Assessment      Flowsheet Row Pre-Admission Testing from 6/18/2025 in [facility] Admission (Discharged) from 1/25/2025 in [facility] with Slime Morris MD   DVT Score (IF A SCORE IS NOT CALCULATING, MUST SELECT A BMI TO COMPLETE) 7 filed at 06/18/2025 0829 5 filed at 01/25/2025 2335   Surgical Factors Major surgery planned,  including arthroscopic and laproscopic (1-2 hours) filed at 06/18/2025 0829 --   Women Pregnancy or less than 1 month postpartum filed at 06/18/2025 0829 Pregnancy or less than 1 month postpartum filed at 01/25/2025 2335   BMI (BMI MUST BE CHOSEN) 31-40 (Obesity) filed at 06/18/2025 0829 31-40 (Obesity) filed at 01/25/2025 2335          Modified Frailty Index    No data to display       [id number] Stroke Risk Points  Current as of 22 minutes ago        N/A 0 to 9 Points:      Last Change: N/A          The [id number] risk score (Lip MITCH, et al. 2009. © 2010 [employer]) quantifies the risk of stroke for a patient with atrial fibrillation. For patients without atrial fibrillation or under the age of 18 this score appears as N/A. Higher score values generally indicate higher risk of stroke.        This score is not applicable to this patient. Components are not calculated.          Revised Cardiac Risk Index      Flowsheet Row Pre-Admission Testing from 6/18/2025 in [facility] Pre-Admission Testing from 3/26/2025 in [facility]   High-Risk Surgery (Intraperitoneal, Intrathoracic,Suprainguinal vascular) 0 filed at 06/18/2025 0829 0 filed at 03/26/2025 0837   History of ischemic heart disease (History of MI, History of positive exercuse test, Current chest paint considered due to myocardial ischemia, Use of nitrate therapy, ECG with pathological Q Waves) 0 filed at 06/18/2025 0829 0 filed at 03/26/2025 0837   History of congestive heart failure (pulmonary edemia, bilateral rales or S3 gallop, Paroxysmal nocturnal dyspnea, CXR showing pulmonary vascular redistribution) 0 filed at 06/18/2025 0829 0 filed at 03/26/2025 0837   History of cerebrovascular disease (Prior TIA or stroke) 0 filed at 06/18/2025 0829 0 filed at 03/26/2025 0837   Pre-operative insulin treatment 0 filed at 06/18/2025 0829 0 filed at 03/26/2025 0837   Pre-operative creatinine>2 mg/dl 0 filed  at 06/18/2025 0829 0 filed at 03/26/2025 0837   Revised Cardiac Risk Calculator 0 filed at 06/18/2025 0829 0 filed at 03/26/2025 0837          Apfel Simplified Score      Flowsheet Row Pre-Admission Testing from 6/18/2025 in [facility]   Smoking status 1 filed at 06/18/2025 0829   History of motion sickness or PONV  0 filed at 06/18/2025 0829   Use of postoperative opioids 1 filed at 06/18/2025 0829   Gender - Female 1=Yes filed at 06/18/2025 0829   Apfel Simplified Score Calculator 3 filed at 06/18/2025 0829          Risk Analysis Index Results This Encounter    No data found in the last 10 encounters.       Stop Bang Score      Flowsheet Row Pre-Admission Testing from 6/18/2025 in [facility] Pre-Admission Testing from 3/26/2025 in [facility]   Do you snore loudly? 0 filed at 06/18/2025 0751 0 filed at 03/26/2025 0818   Do you often feel tired or fatigued after your sleep? 0 filed at 06/18/2025 0751 0 filed at 03/26/2025 0818   Has anyone ever observed you stop breathing in your sleep? 0 filed at 06/18/2025 0751 0 filed at 03/26/2025 0818   Do you have or are you being treated for high blood pressure? 0 filed at 06/18/2025 0751 0 filed at 03/26/2025 0818   Recent BMI (Calculated) 35.1 filed at 06/18/2025 0751 35 filed at 03/26/2025 0818   Is BMI greater than 35 kg/m2? 1=Yes filed at 06/18/2025 0751 0=No filed at 03/26/2025 0818   Age older than 50 years old? 0=No filed at 06/18/2025 0751 0=No filed at 03/26/2025 0818   Is your neck circumference greater than 17 inches (Male) or 16 inches (Female)? 0 filed at 06/18/2025 0751 1 filed at 03/26/2025 0818   Gender - Male 0=No filed at 06/18/2025 0751 0=No filed at 03/26/2025 0818   STOP-BANG Total Score 1 filed at 06/18/2025 0751 1 filed at 03/26/2025 0818          Prodigy: High Risk  Total Score: 0          ARISCAT Score for Postoperative Pulmonary Complications      Flowsheet Row Pre-Admission Testing from 6/18/2025  in [facility]   Age Calculated Score 0 filed at 06/18/2025 0829   Preoperative SpO2 0 filed at 06/18/2025 0829   Respiratory infection in the last month Either upper or lower (i.e., URI, bronchitis, pneumonia), with fever and antibiotic treatment 0 filed at 06/18/2025 0829   Preoperative anemia (Hgb less than 10 g/dl) 0 filed at 06/18/2025 0829   Surgical incision  0 filed at 06/18/2025 0829   Duration of surgery  0 filed at 06/18/2025 0829   Emergency Procedure  0 filed at 06/18/2025 0829   ARISCAT Total Score  0 filed at 06/18/2025 0829          [place] Perioperative Risk for Myocardial Infarction or Cardiac Arrest (EMI)    No data to display       Recent Results (from the past 2 weeks)   Urinalysis with Reflex Culture and Microscopic    Collection Time: 06/08/25  9:37 AM   Result Value Ref Range    Color, Urine Light-Yellow Light-Yellow, Yellow, Dark-Yellow    Appearance, Urine Turbid (N) Clear    Specific Gravity, Urine 1.010 1.005 - 1.035    pH, Urine 7.5 5.0, 5.5, 6.0, 6.5, 7.0, 7.5, 8.0    Protein, Urine NEGATIVE NEGATIVE, 10 (TRACE), 20 (TRACE) mg/dL    Glucose, Urine Normal Normal mg/dL    Blood, Urine 0.1 (1+) (A) NEGATIVE mg/dL    Ketones, Urine NEGATIVE NEGATIVE mg/dL    Bilirubin, Urine NEGATIVE NEGATIVE mg/dL    Urobilinogen, Urine Normal Normal mg/dL    Nitrite, Urine NEGATIVE NEGATIVE    Leukocyte Esterase, Urine 500 Carlos/uL (A) NEGATIVE   Extra Urine Gray Tube    Collection Time: 06/08/25  9:37 AM   Result Value Ref Range    Extra Tube Hold for add-ons.    Microscopic Only, Urine    Collection Time: 06/08/25  9:37 AM   Result Value Ref Range    WBC, Urine >50 (A) 1-5, NONE /HPF    WBC Clumps, Urine MANY Reference range not established. /HPF    RBC, Urine >20 (A) NONE, 1-2, 3-5 /HPF    Squamous Epithelial Cells, Urine 1-9 (SPARSE) Reference range not established. /HPF    Bacteria, Urine 1+ (A) NONE SEEN /HPF   Urine Culture    Collection Time: 06/08/25  9:37 AM    Specimen: Clean  Catch/Voided; Urine   Result Value Ref Range    Urine Culture >=100,000 CFU/mL Enterococcus faecalis (A)        Susceptibility    Enterococcus faecalis - MICROSCAN     Penicillin  Susceptible ug/ml     Ampicillin  Susceptible ug/ml     Trimethoprim/Sulfamethoxazole  Resistant ug/ml     Nitrofurantoin  Susceptible ug/ml     Vancomycin  Susceptible ug/ml     Ciprofloxacin  Susceptible ug/ml     Levofloxacin  Susceptible ug/ml   hCG, Urine, Qualitative    Collection Time: 06/08/25  9:38 AM   Result Value Ref Range    HCG, Urine POSITIVE (A) NEGATIVE   CBC and Auto Differential    Collection Time: 06/08/25  9:40 AM   Result Value Ref Range    WBC 7.6 4.4 - 11.3 x10*3/uL    nRBC 0.0 0.0 - 0.0 /100 WBCs    RBC 4.16 4.00 - 5.20 x10*6/uL    Hemoglobin 11.8 (L) 12.0 - 16.0 g/dL    Hematocrit 35.2 (L) 36.0 - 46.0 %    MCV 85 80 - 100 fL    MCH 28.4 26.0 - 34.0 pg    MCHC 33.5 32.0 - 36.0 g/dL    RDW 13.8 11.5 - 14.5 %    Platelets 264 150 - 450 x10*3/uL    Neutrophils % 73.2 40.0 - 80.0 %    Immature Granulocytes %, Automated 0.4 0.0 - 0.9 %    Lymphocytes % 19.7 13.0 - 44.0 %    Monocytes % 5.6 2.0 - 10.0 %    Eosinophils % 0.7 0.0 - 6.0 %    Basophils % 0.4 0.0 - 2.0 %    Neutrophils Absolute 5.58 1.20 - 7.70 x10*3/uL    Immature Granulocytes Absolute, Automated 0.03 0.00 - 0.70 x10*3/uL    Lymphocytes Absolute 1.50 1.20 - 4.80 x10*3/uL    Monocytes Absolute 0.43 0.10 - 1.00 x10*3/uL    Eosinophils Absolute 0.05 0.00 - 0.70 x10*3/uL    Basophils Absolute 0.03 0.00 - 0.10 x10*3/uL   Comprehensive metabolic panel    Collection Time: 06/08/25  9:40 AM   Result Value Ref Range    Glucose 94 74 - 99 mg/dL    Sodium 132 (L) 136 - 145 mmol/L    Potassium 4.3 3.5 - 5.3 mmol/L    Chloride 102 98 - 107 mmol/L    Bicarbonate 21 21 - 32 mmol/L    Anion Gap 13 10 - 20 mmol/L    Urea Nitrogen 10 6 - 23 mg/dL    Creatinine 0.91 0.50 - 1.05 mg/dL    eGFR 85 >60 mL/min/1.73m*2    Calcium 9.1 8.6 - 10.3 mg/dL    Albumin 3.9 3.4 - 5.0 g/dL     Alkaline Phosphatase 71 33 - 110 U/L    Total Protein 7.0 6.4 - 8.2 g/dL    AST 12 9 - 39 U/L    Bilirubin, Total 0.3 0.0 - 1.2 mg/dL    ALT 12 7 - 45 U/L   POCT UA (nonautomated) manually resulted    Collection Time: 06/16/25 10:54 AM   Result Value Ref Range    POC Color, Urine Yellow Straw, Yellow, Light-Yellow    POC Appearance, Urine Cloudy (A) Clear    POC Glucose, Urine NEGATIVE NEGATIVE mg/dl    POC Bilirubin, Urine NEGATIVE NEGATIVE    POC Ketones, Urine NEGATIVE NEGATIVE mg/dl    POC Specific Gravity, Urine 1.010 1.005 - 1.035    POC Blood, Urine MODERATE (2+) (A) NEGATIVE    POC PH, Urine 7.0 No Reference Range Established PH    POC Protein, Urine 30 (1+) (A) NEGATIVE mg/dl    POC Urobilinogen, Urine 0.2 0.2, 1.0 EU/DL    Poc Nitrite, Urine NEGATIVE NEGATIVE    POC Leukocytes, Urine LARGE (3+) (A) NEGATIVE     Diagnostic Results    EKG 4/27/18  Sinus tachycardia  T wave abnormality, consider anterior ischemia  Abnormal ECG  No previous ECGs available    Assessment and Plan:     Anesthesia:  The patient denies problems with anesthesia in the past such as PONV, prolonged sedation, awareness, dental damage, aspiration, cardiac arrest, difficult intubation, or unexpected hospital admissions.     Neuro:   The patient has no neurological diagnoses or significant findings on chart review, clinical presentation, and evaluation. No grossly apparent neurological perioperative risk.    HEENT/Airway  No documented or reported history of airway difficulty.     Cardiovascular  The patient is scheduled for a low risk procedure. Therefore, no further cardiac evaluation is indicated.  RCRI  The patient meets 0 RCRI criteria and therefor has a 3.9% risk of major adverse cardiac complications.  METS  The patient's functional capacity is greater than 4 METS.  EKG  The patient has no EKG or echocardiographic changes concerning for myocardial ischemia.   Heart Failure  The patient has no known history of heart failure.   Additionally, the patient reports no symptoms of heart failure and demonstrates no signs of heart failure.  Hypertension Evaluation  The patient has no known history of hypertension and has a normal blood pressure today.  Heart Rhythm Evaluation  The patient has no history of arrhythmias.  Heart Valve Evaluation  The patient has no known history of valvular heart disease. The patient has no symptoms or physical exam findings to suggest valvular heart disease.  Cardiology Evaluation  The patient is not followed by cardiology.    EMI score which indicates a 0% risk of intraoperative or 30-day postoperative MACE (major adverse cardiac event).    Pulmonary   The patient has history of asthma with albuterol inhalers as needed. Denies any recent URIs, exacerbations or hospitalizations     The patient has a stop bang score of 1, which places patient at low risk for having OSMEL.    ARISCAT 0, low, 1.6% risk of in-hospital postoperative pulmonary complications  PRODIGY 0, low risk of respiratory depression episode. Patient given PI sheet for preoperative deep breathing exercises.    Hematology  No diagnoses or significant findings on chart review or clinical presentation and evaluation.    Caprini score 7, high risk of perioperative VTE.     Patient instructed to ambulate as soon as possible postoperatively to decrease thromboembolic risk. Initiate mechanical DVT prophylaxis as soon as possible and initiate chemical prophylaxis when deemed safe from a bleeding standpoint post surgery.     Transfusion Evaluation  T&S not obtained. Low likelihood for perioperative transfusion of blood or blood products.    Gastrointestinal  No diagnoses or significant findings on chart review or clinical presentation and evaluation.    Eat 10- 0,  self-perceived oropharyngeal dysphagia scale (0-40)     Genitourinary  The patient has history ofnephrolithiasis, hydronephrosis s/p left ureteral stent placement. Scheduled for surgery with   Urszula on 25.    Renal  No renal diagnoses or significant findings on chart review or clinical presentation and evaluation.    Musculoskeletal  No diagnoses or significant findings on chart review or clinical presentation and evaluation.    Endocrine  No diagnoses or significant findings on chart review or clinical presentation and evaluation.    ID  No diagnoses or significant findings on chart review or clinical presentation and evaluation.    -Preoperative medication instructions were provided and reviewed with the patient.  Any additional testing or evaluation was explained to the patient.  NPO Instructions were discussed, and the patient's questions were answered prior to conclusion of this encounter. Patient verbalized understanding of preoperative instructions. After Visit Summary given.               [1]   Past Medical History:  Diagnosis Date    Abnormal Pap smear of cervix     Allergic     Anemia     Anxiety     Arthritis     Asthma     Depression     Encounter for full-term uncomplicated delivery      (spontaneous vaginal delivery)    Encounter for supervision of normal pregnancy, unspecified, first trimester     Encounter for pregnancy related examination in first trimester    Encounter for supervision of normal pregnancy, unspecified, first trimester     Encounter for pregnancy related examination in first trimester    GERD (gastroesophageal reflux disease)     only with pregnancy    HL (hearing loss)     Kidney stone     Migraine     Other specified health status     No pertinent past medical history    Other specified health status 10/10/2019    No pertinent past surgical history    PCOS (polycystic ovarian syndrome)     Personal history of urinary calculi     History of kidney stones    Polycystic ovary syndrome     Unspecified infection of urinary tract in pregnancy, second trimester ([facility] 2019    UTI (urinary tract infection) in pregnancy in second trimester    Urinary tract  infection    [2]   Past Surgical History:  Procedure Laterality Date    CYSTOSCOPY      LITHOTRIPSY      MENISCECTOMY Right     OTHER SURGICAL HISTORY  07/23/2020    Oral surgery    OTHER SURGICAL HISTORY  06/10/2019    Repair of bladder    WISDOM TOOTH EXTRACTION     [3]   Family History  Problem Relation Name Age of Onset    Other (Cardiac Disorder) Mother M     Hypothyroidism Mother M     Breast cancer Mother M     Uterine cancer Mother M     Asthma Mother M     Blood Disorder Mother M     Drug abuse Mother M     Heart disease Mother M     Hypertension Mother M     Heart murmur Son      Other (LSVC (Persistent left superior vena cava)) Son      Other (LSVC (persistent left suprior vena cava)) Other Child    [4]   Allergies  Allergen Reactions    Amoxicillin Anaphylaxis and Hives    Lactose Diarrhea    Penicillins Anaphylaxis and Hives    Prednisone Anaphylaxis and Hives    Raspberry Anaphylaxis    Wheat Hives    Latex Hives

## 2025-06-18 NOTE — PREPROCEDURE INSTRUCTIONS
Fasting Guidelines    NPO Instructions:    With sedation, food or liquid in your stomach can enter your lungs causing serious complications  Increases nausea and vomiting    When do I need to stop eating and drinking before my surgery?  Do not eat any food after midnight the night before your surgery/procedure  You may have up to 13.5 ounces of clear liquids until TWO hours before your instructed arrival time to the hospital. This includes water, black tea/coffee (no milk or creamer), apple juice, and electrolyte replacement beverages (Gatorade)  You may chew gum until TWO hours before your surgery/procedure    Additional Instructions:     Avoid herbal supplements, multivitamins and NSAIDS (non-steroidal anti-inflammatory drugs) such as Advil, Aleve, Ibuprofen, Naproxen, Excedrin, Meloxicam or Celebrex for at least 7 days prior to surgery. May take Tylenol as needed.    Avoid tobacco and alcohol products for 24 hours prior to surgery.    CONTACT SURGEON'S OFFICE IF YOU DEVELOP:  * Fever = 100.4 F   * New respiratory symptoms (e.g. cough, shortness of breath, respiratory distress, sore throat)  * Recent loss of taste or smell  *Flu like symptoms such as headache, fatigue or gastrointestinal symptoms  * You develop any open sores, shingles, burning or painful urination   AND/OR:  * You no longer wish to have the surgery.  * Any other personal circumstances change that may lead to the need to cancel or defer this surgery.  *You were admitted to any hospital within one week of your planned procedure.    Seven/Six Days before Surgery:  Review your medication instructions, stop indicated medications    Day of Surgery:  Review your medication instructions, take indicated medications  Wear comfortable loose fitting clothing  Do not use moisturizers, creams, lotions or perfume  All jewelry and valuables should be left at home    Carlitos Barnes [facility]  [facility]  [phone]  [phone]  [email or web address]              Preoperative Brain Exercises    What are brain exercises?  A brain exercise is any activity that engages your thinking (cognitive) skills.    What types of activities are considered brain exercises?  Jigsaw puzzles, crossword puzzles, word jumble, memory games, word search, and many more.  Many can be found free online or on your phone via a mobile nancy.    Why should I do brain exercises before my surgery?  More recent research has shown brain exercise before surgery can lower the risk of postoperative delirium (confusion) which can be especially important for older adults.  Patients who did brain exercises for 5 to 10 hours the days before surgery, cut their risk of postoperative delirium in half up to 1 week after surgery.         [facility]    Preoperative Deep Breathing Exercises    Why it is important to do deep breathing exercises before my surgery?  Deep breathing exercises strengthen your breathing muscles.  This helps you to recover after your surgery and decreases the chance of breathing complications.      How are the deep breathing exercises done?  Sit straight with your back supported.  Breathe in deeply and slowly through your nose. Your lower rib cage should expand and your abdomen may move forward.  Hold that breath for 3 to 5 seconds.  Breathe out through pursed lips, slowly and completely.  Rest and repeat 10 times every hour while awake.  Rest longer if you become dizzy or lightheaded.         Patient and Family Education             Ways You Can Help Prevent Blood Clots             This handout explains some simple things you can do to help prevent blood clots.      Blood clots are blockages that can form in the body's veins. When a blood clot forms in your deep veins, it may be called a deep vein thrombosis, or DVT for short. Blood clots can happen in any part of the  body where blood flows, but they are most common in the arms and legs. If a piece of a blood clot breaks free and travels to the lungs, it is called a pulmonary embolus (PE). A PE can be a very serious problem.         Being in the hospital or having surgery can raise your chances of getting a blood clot because you may not be well enough to move around as much as you normally do.         Ways you can help prevent blood clots in the hospital         Wearing SCDs. SCDs stands for Sequential Compression Devices.   SCDs are special sleeves that wrap around your legs  They attach to a pump that fills them with air to gently squeeze your legs every few minutes.   This helps return the blood in your legs to your heart.   SCDs should only be taken off when walking or bathing.   SCDs may not be comfortable, but they can help save your life.               Wearing compression stockings - if your doctor orders them. These special snug fitting stockings gently squeeze your legs to help blood flow.       Walking. Walking helps move the blood in your legs.   If your doctor says it is ok, try walking the halls at least   5 times a day. Ask us to help you get up, so you don't fall.      Taking any blood thinning medicines your doctor orders.        Page 1 of 2     [facility]; 3/23   Ways you can help prevent blood clots at home       Wearing compression stockings - if your doctor orders them. ? Walking - to help move the blood in your legs.       Taking any blood thinning medicines your doctor orders.      Signs of a blood clot or PE      Tell your doctor or nurse know right away if you have of the problems listed below.    If you are at home, seek medical care right away. Call 911 for chest pain or problems breathing.               Signs of a blood clot (DVT) - such as pain,  swelling, redness or warmth in your arm or leg      Signs of a pulmonary embolism (PE) - such as chest     pain or feeling short of breath

## 2025-06-18 NOTE — CPM/PAT H&P
"CPM/PAT Evaluation       Name: Breanna Jackson (Breanna Jackson \"Netta\")  /Age: 1990/35 y.o.     Visit Type:   In-Person       Chief Complaint: perioperative evaluation      HPI  The patient is a 35 year old female with history of nephrolithiasis, hydronephrosis s/p left ureteral stent placement 2025. She is currently 28 weeks and 4 days pregnant. Last stent exchange was 25. Her pregnancy course has been complicated by UTIs, currently being treated with antibiotics. She is referred today by Dr. Maverick Seaman for perioperative evaluation in anticipation of cystoscopy with laser lithotripsy-left, stent removal on 25.    Medical History[1]    Surgical History[2]    Patient Sexual activity questions deferred to the physician.    Family History[3]    Allergies[4]    Prior to Admission medications    Medication Sig Start Date End Date Taking? Authorizing Provider   aspirin 81 mg EC tablet Take 1 tablet (81 mg) by mouth once daily.    Historical Provider, MD   nitrofurantoin, macrocrystal-monohydrate, (Macrobid) 100 mg capsule Take 1 capsule (100 mg) by mouth 2 times a day for 7 days. 25  Maverick Seaman MD   ondansetron ODT (Zofran-ODT) 4 mg disintegrating tablet DISSOLVE 1 TABLET IN MOUTH EVERY 8 HOURS AS NEEDED FOR NAUSEA FOR VOMITING 25   Slime Morris MD   prenatal no115/iron/folic acid (PRENATAL 19 ORAL) Take by mouth.    Historical Provider, MD   nitrofurantoin, macrocrystal-monohydrate, (Macrobid) 100 mg capsule Take 1 capsule (100 mg) by mouth 2 times a day for 7 days. 25  Viviana Mcneill PA-C   oxymetazoline (Afrin) 0.05 % nasal spray Administer 2 sprays into each nostril every 12 hours if needed for congestion for up to 2 days. Do not use for more than 3 days. 25  AMANDO Franz ROS:   Constitutional:   neg    Neuro/Psych:   neg    Eyes:   neg    Ears:   neg    Nose:   neg    Mouth:   neg    Throat:   neg    Neck:   neg    Cardio:   neg  " "  Respiratory:   neg    Endocrine:   neg    GI:    nausea   vomiting  :    dysuria  Musculoskeletal:   neg    Hematologic:   neg    Skin:  neg        Physical Exam  Vitals reviewed.   Constitutional:       Appearance: Normal appearance.   HENT:      Head: Normocephalic and atraumatic.      Nose: Nose normal.      Mouth/Throat:      Mouth: Mucous membranes are moist.      Pharynx: Oropharynx is clear.   Eyes:      Extraocular Movements: Extraocular movements intact.      Conjunctiva/sclera: Conjunctivae normal.      Pupils: Pupils are equal, round, and reactive to light.   Cardiovascular:      Rate and Rhythm: Normal rate and regular rhythm.      Pulses: Normal pulses.      Heart sounds: Normal heart sounds.   Pulmonary:      Effort: Pulmonary effort is normal.      Breath sounds: Normal breath sounds.   Musculoskeletal:         General: Normal range of motion.      Cervical back: Normal range of motion.   Skin:     General: Skin is warm and dry.   Neurological:      General: No focal deficit present.      Mental Status: She is alert and oriented to person, place, and time.   Psychiatric:         Mood and Affect: Mood normal.         Behavior: Behavior normal.          PAT AIRWAY:   Airway:     Mallampati::  IV    TM distance::  >3 FB    Neck ROM::  Full  normal          Visit Vitals  /84   Pulse 74   Temp 36.8 °C (98.3 °F)   Ht 1.575 m (5' 2\")   Wt 87.7 kg (193 lb 4.8 oz)   LMP 11/23/2024   SpO2 97%   BMI 35.36 kg/m²   OB Status Pregnant   Smoking Status Never   BSA 1.96 m²       DASI Risk Score      Flowsheet Row Pre-Admission Testing from 6/18/2025 in [facility] Questionnaire Series Submission from 6/5/2025 in [facility] OR with Generic Provider Yue   Can you take care of yourself (eat, dress, bathe, or use toilet)?  2.75 filed at 06/18/2025 0752 2.75  filed at 06/05/2025 2109   Can you walk indoors, such as around your house? 1.75 filed at 06/18/2025 0752 1.75  filed " at 06/05/2025 2109   Can you walk a block or two on level ground?  2.75 filed at 06/18/2025 0752 2.75  filed at 06/05/2025 2109   Can you climb a flight of stairs or walk up a hill? 5.5 filed at 06/18/2025 0752 5.5  filed at 06/05/2025 2109   Can you run a short distance? 8 filed at 06/18/2025 0752 8  filed at 06/05/2025 2109   Can you do light work around the house like dusting or washing dishes? 2.7 filed at 06/18/2025 0752 2.7  filed at 06/05/2025 2109   Can you do moderate work around the house like vacuuming, sweeping floors or carrying groceries? 3.5 filed at 06/18/2025 0752 3.5  filed at 06/05/2025 2109   Can you do heavy work around the house like scrubbing floors or lifting and moving heavy furniture?  8 filed at 06/18/2025 0752 0  filed at 06/05/2025 2109   Can you do yard work like raking leaves, weeding or pushing a mower? 4.5 filed at 06/18/2025 0752 0  filed at 06/05/2025 2109   Can you have sexual relations? 5.25 filed at 06/18/2025 0752 5.25  filed at 06/05/2025 2109   Can you participate in moderate recreational activities like golf, bowling, dancing, doubles tennis or throwing a baseball or football? 6 filed at 06/18/2025 0752 6  filed at 06/05/2025 2109   Can you participate in strenous sports like swimming, singles tennis, football, basketball, or skiing? 7.5 filed at 06/18/2025 0752 0  filed at 06/05/2025 2109   DASI SCORE 58.2 filed at 06/18/2025 0752 38.2  filed at 06/05/2025 2109   METS Score (Will be calculated only when all the questions are answered) 9.9 filed at 06/18/2025 0752 7.4  filed at 06/05/2025 2109          Caprini DVT Assessment      Flowsheet Row Pre-Admission Testing from 6/18/2025 in [facility] Admission (Discharged) from 1/25/2025 in [facility] with Slime Morris MD   DVT Score (IF A SCORE IS NOT CALCULATING, MUST SELECT A BMI TO COMPLETE) 7 filed at 06/18/2025 0829 5 filed at 01/25/2025 2335   Surgical Factors Major surgery planned,  including arthroscopic and laproscopic (1-2 hours) filed at 06/18/2025 0829 --   Women Pregnancy or less than 1 month postpartum filed at 06/18/2025 0829 Pregnancy or less than 1 month postpartum filed at 01/25/2025 2335   BMI (BMI MUST BE CHOSEN) 31-40 (Obesity) filed at 06/18/2025 0829 31-40 (Obesity) filed at 01/25/2025 2335          Modified Frailty Index    No data to display       [id number] Stroke Risk Points  Current as of 22 minutes ago        N/A 0 to 9 Points:      Last Change: N/A          The [id number] risk score (Lip MITCH, et al. 2009. © 2010 [employer]) quantifies the risk of stroke for a patient with atrial fibrillation. For patients without atrial fibrillation or under the age of 18 this score appears as N/A. Higher score values generally indicate higher risk of stroke.        This score is not applicable to this patient. Components are not calculated.          Revised Cardiac Risk Index      Flowsheet Row Pre-Admission Testing from 6/18/2025 in [facility] Pre-Admission Testing from 3/26/2025 in [facility]   High-Risk Surgery (Intraperitoneal, Intrathoracic,Suprainguinal vascular) 0 filed at 06/18/2025 0829 0 filed at 03/26/2025 0837   History of ischemic heart disease (History of MI, History of positive exercuse test, Current chest paint considered due to myocardial ischemia, Use of nitrate therapy, ECG with pathological Q Waves) 0 filed at 06/18/2025 0829 0 filed at 03/26/2025 0837   History of congestive heart failure (pulmonary edemia, bilateral rales or S3 gallop, Paroxysmal nocturnal dyspnea, CXR showing pulmonary vascular redistribution) 0 filed at 06/18/2025 0829 0 filed at 03/26/2025 0837   History of cerebrovascular disease (Prior TIA or stroke) 0 filed at 06/18/2025 0829 0 filed at 03/26/2025 0837   Pre-operative insulin treatment 0 filed at 06/18/2025 0829 0 filed at 03/26/2025 0837   Pre-operative creatinine>2 mg/dl 0 filed  at 06/18/2025 0829 0 filed at 03/26/2025 0837   Revised Cardiac Risk Calculator 0 filed at 06/18/2025 0829 0 filed at 03/26/2025 0837          Apfel Simplified Score      Flowsheet Row Pre-Admission Testing from 6/18/2025 in [facility]   Smoking status 1 filed at 06/18/2025 0829   History of motion sickness or PONV  0 filed at 06/18/2025 0829   Use of postoperative opioids 1 filed at 06/18/2025 0829   Gender - Female 1=Yes filed at 06/18/2025 0829   Apfel Simplified Score Calculator 3 filed at 06/18/2025 0829          Risk Analysis Index Results This Encounter    No data found in the last 10 encounters.       Stop Bang Score      Flowsheet Row Pre-Admission Testing from 6/18/2025 in [facility] Pre-Admission Testing from 3/26/2025 in [facility]   Do you snore loudly? 0 filed at 06/18/2025 0751 0 filed at 03/26/2025 0818   Do you often feel tired or fatigued after your sleep? 0 filed at 06/18/2025 0751 0 filed at 03/26/2025 0818   Has anyone ever observed you stop breathing in your sleep? 0 filed at 06/18/2025 0751 0 filed at 03/26/2025 0818   Do you have or are you being treated for high blood pressure? 0 filed at 06/18/2025 0751 0 filed at 03/26/2025 0818   Recent BMI (Calculated) 35.1 filed at 06/18/2025 0751 35 filed at 03/26/2025 0818   Is BMI greater than 35 kg/m2? 1=Yes filed at 06/18/2025 0751 0=No filed at 03/26/2025 0818   Age older than 50 years old? 0=No filed at 06/18/2025 0751 0=No filed at 03/26/2025 0818   Is your neck circumference greater than 17 inches (Male) or 16 inches (Female)? 0 filed at 06/18/2025 0751 1 filed at 03/26/2025 0818   Gender - Male 0=No filed at 06/18/2025 0751 0=No filed at 03/26/2025 0818   STOP-BANG Total Score 1 filed at 06/18/2025 0751 1 filed at 03/26/2025 0818          Prodigy: High Risk  Total Score: 0          ARISCAT Score for Postoperative Pulmonary Complications      Flowsheet Row Pre-Admission Testing from 6/18/2025  in [facility]   Age Calculated Score 0 filed at 06/18/2025 0829   Preoperative SpO2 0 filed at 06/18/2025 0829   Respiratory infection in the last month Either upper or lower (i.e., URI, bronchitis, pneumonia), with fever and antibiotic treatment 0 filed at 06/18/2025 0829   Preoperative anemia (Hgb less than 10 g/dl) 0 filed at 06/18/2025 0829   Surgical incision  0 filed at 06/18/2025 0829   Duration of surgery  0 filed at 06/18/2025 0829   Emergency Procedure  0 filed at 06/18/2025 0829   ARISCAT Total Score  0 filed at 06/18/2025 0829          [place] Perioperative Risk for Myocardial Infarction or Cardiac Arrest (EMI)    No data to display       Recent Results (from the past 2 weeks)   Urinalysis with Reflex Culture and Microscopic    Collection Time: 06/08/25  9:37 AM   Result Value Ref Range    Color, Urine Light-Yellow Light-Yellow, Yellow, Dark-Yellow    Appearance, Urine Turbid (N) Clear    Specific Gravity, Urine 1.010 1.005 - 1.035    pH, Urine 7.5 5.0, 5.5, 6.0, 6.5, 7.0, 7.5, 8.0    Protein, Urine NEGATIVE NEGATIVE, 10 (TRACE), 20 (TRACE) mg/dL    Glucose, Urine Normal Normal mg/dL    Blood, Urine 0.1 (1+) (A) NEGATIVE mg/dL    Ketones, Urine NEGATIVE NEGATIVE mg/dL    Bilirubin, Urine NEGATIVE NEGATIVE mg/dL    Urobilinogen, Urine Normal Normal mg/dL    Nitrite, Urine NEGATIVE NEGATIVE    Leukocyte Esterase, Urine 500 Carlos/uL (A) NEGATIVE   Extra Urine Gray Tube    Collection Time: 06/08/25  9:37 AM   Result Value Ref Range    Extra Tube Hold for add-ons.    Microscopic Only, Urine    Collection Time: 06/08/25  9:37 AM   Result Value Ref Range    WBC, Urine >50 (A) 1-5, NONE /HPF    WBC Clumps, Urine MANY Reference range not established. /HPF    RBC, Urine >20 (A) NONE, 1-2, 3-5 /HPF    Squamous Epithelial Cells, Urine 1-9 (SPARSE) Reference range not established. /HPF    Bacteria, Urine 1+ (A) NONE SEEN /HPF   Urine Culture    Collection Time: 06/08/25  9:37 AM    Specimen: Clean  Catch/Voided; Urine   Result Value Ref Range    Urine Culture >=100,000 CFU/mL Enterococcus faecalis (A)        Susceptibility    Enterococcus faecalis - MICROSCAN     Penicillin  Susceptible ug/ml     Ampicillin  Susceptible ug/ml     Trimethoprim/Sulfamethoxazole  Resistant ug/ml     Nitrofurantoin  Susceptible ug/ml     Vancomycin  Susceptible ug/ml     Ciprofloxacin  Susceptible ug/ml     Levofloxacin  Susceptible ug/ml   hCG, Urine, Qualitative    Collection Time: 06/08/25  9:38 AM   Result Value Ref Range    HCG, Urine POSITIVE (A) NEGATIVE   CBC and Auto Differential    Collection Time: 06/08/25  9:40 AM   Result Value Ref Range    WBC 7.6 4.4 - 11.3 x10*3/uL    nRBC 0.0 0.0 - 0.0 /100 WBCs    RBC 4.16 4.00 - 5.20 x10*6/uL    Hemoglobin 11.8 (L) 12.0 - 16.0 g/dL    Hematocrit 35.2 (L) 36.0 - 46.0 %    MCV 85 80 - 100 fL    MCH 28.4 26.0 - 34.0 pg    MCHC 33.5 32.0 - 36.0 g/dL    RDW 13.8 11.5 - 14.5 %    Platelets 264 150 - 450 x10*3/uL    Neutrophils % 73.2 40.0 - 80.0 %    Immature Granulocytes %, Automated 0.4 0.0 - 0.9 %    Lymphocytes % 19.7 13.0 - 44.0 %    Monocytes % 5.6 2.0 - 10.0 %    Eosinophils % 0.7 0.0 - 6.0 %    Basophils % 0.4 0.0 - 2.0 %    Neutrophils Absolute 5.58 1.20 - 7.70 x10*3/uL    Immature Granulocytes Absolute, Automated 0.03 0.00 - 0.70 x10*3/uL    Lymphocytes Absolute 1.50 1.20 - 4.80 x10*3/uL    Monocytes Absolute 0.43 0.10 - 1.00 x10*3/uL    Eosinophils Absolute 0.05 0.00 - 0.70 x10*3/uL    Basophils Absolute 0.03 0.00 - 0.10 x10*3/uL   Comprehensive metabolic panel    Collection Time: 06/08/25  9:40 AM   Result Value Ref Range    Glucose 94 74 - 99 mg/dL    Sodium 132 (L) 136 - 145 mmol/L    Potassium 4.3 3.5 - 5.3 mmol/L    Chloride 102 98 - 107 mmol/L    Bicarbonate 21 21 - 32 mmol/L    Anion Gap 13 10 - 20 mmol/L    Urea Nitrogen 10 6 - 23 mg/dL    Creatinine 0.91 0.50 - 1.05 mg/dL    eGFR 85 >60 mL/min/1.73m*2    Calcium 9.1 8.6 - 10.3 mg/dL    Albumin 3.9 3.4 - 5.0 g/dL     Alkaline Phosphatase 71 33 - 110 U/L    Total Protein 7.0 6.4 - 8.2 g/dL    AST 12 9 - 39 U/L    Bilirubin, Total 0.3 0.0 - 1.2 mg/dL    ALT 12 7 - 45 U/L   POCT UA (nonautomated) manually resulted    Collection Time: 06/16/25 10:54 AM   Result Value Ref Range    POC Color, Urine Yellow Straw, Yellow, Light-Yellow    POC Appearance, Urine Cloudy (A) Clear    POC Glucose, Urine NEGATIVE NEGATIVE mg/dl    POC Bilirubin, Urine NEGATIVE NEGATIVE    POC Ketones, Urine NEGATIVE NEGATIVE mg/dl    POC Specific Gravity, Urine 1.010 1.005 - 1.035    POC Blood, Urine MODERATE (2+) (A) NEGATIVE    POC PH, Urine 7.0 No Reference Range Established PH    POC Protein, Urine 30 (1+) (A) NEGATIVE mg/dl    POC Urobilinogen, Urine 0.2 0.2, 1.0 EU/DL    Poc Nitrite, Urine NEGATIVE NEGATIVE    POC Leukocytes, Urine LARGE (3+) (A) NEGATIVE     Diagnostic Results    EKG 4/27/18  Sinus tachycardia  T wave abnormality, consider anterior ischemia  Abnormal ECG  No previous ECGs available    Assessment and Plan:     Anesthesia:  The patient denies problems with anesthesia in the past such as PONV, prolonged sedation, awareness, dental damage, aspiration, cardiac arrest, difficult intubation, or unexpected hospital admissions.     Neuro:   The patient has no neurological diagnoses or significant findings on chart review, clinical presentation, and evaluation. No grossly apparent neurological perioperative risk.    HEENT/Airway  No documented or reported history of airway difficulty.     Cardiovascular  The patient is scheduled for a low risk procedure. Therefore, no further cardiac evaluation is indicated.  RCRI  The patient meets 0 RCRI criteria and therefor has a 3.9% risk of major adverse cardiac complications.  METS  The patient's functional capacity is greater than 4 METS.  EKG  The patient has no EKG or echocardiographic changes concerning for myocardial ischemia.   Heart Failure  The patient has no known history of heart failure.   Additionally, the patient reports no symptoms of heart failure and demonstrates no signs of heart failure.  Hypertension Evaluation  The patient has no known history of hypertension and has a normal blood pressure today.  Heart Rhythm Evaluation  The patient has no history of arrhythmias.  Heart Valve Evaluation  The patient has no known history of valvular heart disease. The patient has no symptoms or physical exam findings to suggest valvular heart disease.  Cardiology Evaluation  The patient is not followed by cardiology.    EMI score which indicates a 0% risk of intraoperative or 30-day postoperative MACE (major adverse cardiac event).    Pulmonary   The patient has history of asthma with albuterol inhalers as needed. Denies any recent URIs, exacerbations or hospitalizations     The patient has a stop bang score of 1, which places patient at low risk for having OSMEL.    ARISCAT 0, low, 1.6% risk of in-hospital postoperative pulmonary complications  PRODIGY 0, low risk of respiratory depression episode. Patient given PI sheet for preoperative deep breathing exercises.    Hematology  No diagnoses or significant findings on chart review or clinical presentation and evaluation.    Caprini score 7, high risk of perioperative VTE.     Patient instructed to ambulate as soon as possible postoperatively to decrease thromboembolic risk. Initiate mechanical DVT prophylaxis as soon as possible and initiate chemical prophylaxis when deemed safe from a bleeding standpoint post surgery.     Transfusion Evaluation  T&S not obtained. Low likelihood for perioperative transfusion of blood or blood products.    Gastrointestinal  No diagnoses or significant findings on chart review or clinical presentation and evaluation.    Eat 10- 0,  self-perceived oropharyngeal dysphagia scale (0-40)     Genitourinary  The patient has history ofnephrolithiasis, hydronephrosis s/p left ureteral stent placement. Scheduled for surgery with   Urszula on 25.    Renal  No renal diagnoses or significant findings on chart review or clinical presentation and evaluation.    Musculoskeletal  No diagnoses or significant findings on chart review or clinical presentation and evaluation.    Endocrine  No diagnoses or significant findings on chart review or clinical presentation and evaluation.    ID  No diagnoses or significant findings on chart review or clinical presentation and evaluation.    -Preoperative medication instructions were provided and reviewed with the patient.  Any additional testing or evaluation was explained to the patient.  NPO Instructions were discussed, and the patient's questions were answered prior to conclusion of this encounter. Patient verbalized understanding of preoperative instructions. After Visit Summary given.               [1]   Past Medical History:  Diagnosis Date    Abnormal Pap smear of cervix     Allergic     Anemia     Anxiety     Arthritis     Asthma     Depression     Encounter for full-term uncomplicated delivery      (spontaneous vaginal delivery)    Encounter for supervision of normal pregnancy, unspecified, first trimester     Encounter for pregnancy related examination in first trimester    Encounter for supervision of normal pregnancy, unspecified, first trimester     Encounter for pregnancy related examination in first trimester    GERD (gastroesophageal reflux disease)     only with pregnancy    HL (hearing loss)     Kidney stone     Migraine     Other specified health status     No pertinent past medical history    Other specified health status 10/10/2019    No pertinent past surgical history    PCOS (polycystic ovarian syndrome)     Personal history of urinary calculi     History of kidney stones    Polycystic ovary syndrome     Unspecified infection of urinary tract in pregnancy, second trimester ([facility]) 2019    UTI (urinary tract infection) in pregnancy in second trimester    Urinary tract  infection    [2]   Past Surgical History:  Procedure Laterality Date    CYSTOSCOPY      LITHOTRIPSY      MENISCECTOMY Right     OTHER SURGICAL HISTORY  07/23/2020    Oral surgery    OTHER SURGICAL HISTORY  06/10/2019    Repair of bladder    WISDOM TOOTH EXTRACTION     [3]   Family History  Problem Relation Name Age of Onset    Other (Cardiac Disorder) Mother M     Hypothyroidism Mother M     Breast cancer Mother M     Uterine cancer Mother M     Asthma Mother M     Blood Disorder Mother M     Drug abuse Mother M     Heart disease Mother M     Hypertension Mother M     Heart murmur Son      Other (LSVC (Persistent left superior vena cava)) Son      Other (LSVC (persistent left suprior vena cava)) Other Child    [4]   Allergies  Allergen Reactions    Amoxicillin Anaphylaxis and Hives    Lactose Diarrhea    Penicillins Anaphylaxis and Hives    Prednisone Anaphylaxis and Hives    Raspberry Anaphylaxis    Wheat Hives    Latex Hives

## 2025-06-19 ENCOUNTER — DOCUMENTATION (OUTPATIENT)
Dept: OBSTETRICS AND GYNECOLOGY | Facility: HOSPITAL | Age: 35
End: 2025-06-19

## 2025-06-19 ENCOUNTER — ANESTHESIA EVENT (OUTPATIENT)
Dept: OPERATING ROOM | Facility: HOSPITAL | Age: 35
End: 2025-06-19
Payer: MEDICAID

## 2025-06-19 ENCOUNTER — APPOINTMENT (OUTPATIENT)
Dept: OBSTETRICS AND GYNECOLOGY | Facility: CLINIC | Age: 35
End: 2025-06-19
Payer: MEDICAID

## 2025-06-19 VITALS — SYSTOLIC BLOOD PRESSURE: 100 MMHG | DIASTOLIC BLOOD PRESSURE: 70 MMHG | BODY MASS INDEX: 35.3 KG/M2 | WEIGHT: 193 LBS

## 2025-06-19 DIAGNOSIS — Z3A.28 28 WEEKS GESTATION OF PREGNANCY (HHS-HCC): Primary | ICD-10-CM

## 2025-06-19 LAB
BACTERIA UR CULT: ABNORMAL
POC BLOOD, URINE: ABNORMAL
POC GLUCOSE, URINE: NEGATIVE MG/DL
POC LEUKOCYTES, URINE: ABNORMAL
POC NITRITE,URINE: NEGATIVE
POC PROTEIN, URINE: ABNORMAL MG/DL

## 2025-06-19 PROCEDURE — 99213 OFFICE O/P EST LOW 20 MIN: CPT | Performed by: MIDWIFE

## 2025-06-19 NOTE — PROGRESS NOTES
"Subjective   Patient ID [id number]   Netta Jackson is a 35 y.o.  at 28w5d with a working estimated date of delivery of 2025, by Ultrasound who presents for a routine prenatal visit. She denies vaginal bleeding, leakage of fluid, decreased fetal movements, or contractions.    Her pregnancy is complicated by:  Prefers 'Netta'.  Obesity in pregnancy.  History of von Willebrand's.  Obtain fetal echo as history of abnormal cardiac ultrasound findings in previous pregnancies that resolved spontaneously.  Renal stone.  Stent placed left side.  Weekly NST 34 weeks.  Recommend baby aspirin daily; Vaginal bleeding-History of postpartum hemorrhage after first pregnancy no transfusion.  Last saw hematologist in .  BMI greater than 30.  Low-lying placenta/placenta previa has resolved    Objective   Physical Exam:   Weight: 87.5 kg (193 lb)  Expected Total Weight Gain: Could not be calculated   Pregravid BMI: Could not be calculated  BP: 100/70                  Prenatal Labs  Urine Dip:  Lab Results   Component Value Date    KETONESU NEGATIVE 2025     Lab Results   Component Value Date    HGB 11.8 (L) 2025    HCT 35.2 (L) 2025    ABO O 2025    HEPBSAG Nonreactive 2025     No results found for: \"PAPPA\", \"AFP\", \"HCG\", \"ESTRIOL\", \"INHBA\"  No results found for: \"GLUF\", \"GLUT1\", \"UQLMBGW5ZM\", \"[id number]\"    Imaging  The most recent ultrasound was performed on   with a study GA of   and EFW of  .          Assessment/Plan   Diagnoses and all orders for this visit:  28 weeks gestation of pregnancy ([facility])  -     POCT UA (nonautomated) manually resulted    Continue prenatal vitamin.  Labs reviewed.    Expected mode of delivery vaginal  Pt has scheduled kidney stone retrieval tomorrow 25 d/t ongoing UTI  Importance of daily FM, warning s/sx reviewed  Follow up in 2 week for a routine prenatal visit.  "

## 2025-06-19 NOTE — PROGRESS NOTES
Non-OB Surgery Plan updated in Care Coordination note.   Plan for MFM Pre-Op consult on 6/20 at 6:30am with Dr. Luevano in [facility] Pre-op.   Post-op fetal monitoring plan to be confirmed following MFM consult.   All Multi-disciplinary Teams aware.     Mayela Dixon CNP  Complex/High Risk OB   [phone]

## 2025-06-19 NOTE — CONSULTS
MFM Consult    Reason for Consult: urologic procedure    HPI:   Breanna Jackson is a 35 y.o.  at 28w5d presents for laser lithotripsy, cysto, and stent removal.     Patient has h/o obstructing nephrolithiasis s/p L ureteral stent placed 2025, s/p exchange In April. Has had recurrent UTIs this pregnancy s/p multiple rounds of antibiotics (last Ucx pos )    Pregnancy otherwise notable for:  - Possible von Willebrands Disease, suspected by decreased VFW activity (38%) in  during work up for menorrhagia. Factor 8 and VFW antigen wnl. Repeat activity level wnl. No testing since .  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  - class II obesity   - resolved low lying placenta    Obstetrical History   OB History          5    Para   3    Term   3            AB   1    Living   3         SAB   1    IAB        Ectopic        Multiple        Live Births   3                 Past Medical History  Medical History[1]     Past Surgical History   Surgical History[2]    Social History  Social History     Socioeconomic History    Marital status: Single     Spouse name: Maverick Hernandez    Number of children: 3    Years of education: Not on file    Highest education level: Some college, no degree   Occupational History    Not on file   Tobacco Use    Smoking status: Never    Smokeless tobacco: Never   Vaping Use    Vaping status: Never Used   Substance and Sexual Activity    Alcohol use: Never    Drug use: Never    Sexual activity: Defer   Other Topics Concern    Not on file   Social History Narrative    Not on file     Social Drivers of Health     Financial Resource Strain: Low Risk  (2025)    Overall Financial Resource Strain (CARDIA)     Difficulty of Paying Living Expenses: Not hard at all   Food Insecurity: No Food Insecurity (2025)    Hunger Vital Sign     Worried About Running Out of Food in the Last Year: Never true     Ran Out of Food in the Last Year: Never true   Transportation Needs: No  Transportation Needs (2025)    PRAPARE - Transportation     Lack of Transportation (Medical): No     Lack of Transportation (Non-Medical): No   Physical Activity: Inactive (2025)    Exercise Vital Sign     Days of Exercise per Week: 0 days     Minutes of Exercise per Session: 0 min   Stress: No Stress Concern Present (2025)    [employer] - Occupational Stress Questionnaire     Feeling of Stress : Not at all   Social Connections: Unknown (2025)    Social Connection and Isolation Panel [NHANES]     Frequency of Communication with Friends and Family: More than three times a week     Frequency of Social Gatherings with Friends and Family: More than three times a week     Attends Zoroastrian Services: Patient declined     Active Member of Clubs or Organizations: Patient declined     Attends Club or Organization Meetings: Patient declined     Marital Status:    Intimate Partner Violence: Not At Risk (2025)    Humiliation, Afraid, Rape, and Kick questionnaire     Fear of Current or Ex-Partner: No     Emotionally Abused: No     Physically Abused: No     Sexually Abused: No       Allergies  RX Allergies[3]    Medications  Prescriptions Prior to Admission[4]    OBJECTIVE:   LMP 2024    BP  Min: 100/70  Max: 100/70    Physical exam:  General:  AAOx3, No acute distress  Cardiovascular: Warm and well perfused  Respiratory: Normal respiratory effort   Abdominal:  gravid  Extremities: moving all extremities    Labs:   Labs in chart were reviewed.    ASSESSMENT AND PLAN:     35 y.o.  at 28w5d presents for laser lithotripsy in s/o nephrolithiasis and rUTIs.    Sarah-procedural care in pregnancy  - We reviewed that overall the risk to the fetus with this procedure is low, and surgical field is separate from the uterus/fetus  - Risks of procedure are more related to risk of general anesthesia, however again this risk is low  - given her gestational age would  recommend continuous feal monitoring during procedure  - we discussed with patient risk of fetal distress intraoperatively and indication for emergent  delivery in the setting of non-reassuring fetal status, though the risk of occurrence is overall low. Given gestational age if fetal distress is suspected, would recommend delivery to intervene. We also discussed risk of  delivery in this setting  - Given low risk procedure and low chance of need for delivery, would not recommend steroids for fetal lung maturity  - recommend NST prior to procedure, CEFM during procedure, and CEFM for 1 hour post procedure  - Patient does not require inpatient admission from OB standpoint if fetal heart rate is reassuring    Possible VFW  - records reviewed. Unclear history based on 1 prior elevated VFW activity level with otherwise normal work up. Repeat testing wnl, less consistent with VWF  - Patient also with h/o PPH however this was immediate after delivery and is less consistent with VFW, also did not receive medications for this during prior deliveries.  - recommend repeat VFW antigen, activity, and Factor VIII activity this pregnancy, as well as hematology consult outpatient  - Defer route of anesthesia to anesthesia team, however suspect bleeding risk is low and could consider regional.   - based on available labs in chart, would defer treatment for VFW in pregnancy at this time    Fetal status: NST to be done now      Pt seen and discussed with M Attending, .     Ashley Randall MD , PGY-3  [facility]  Pager [phone]     Assessment & Plan              [1]   Past Medical History:  Diagnosis Date    Abnormal Pap smear of cervix     Allergic     Anemia     Anxiety     Arthritis     Asthma     Depression     Encounter for full-term uncomplicated delivery      (spontaneous vaginal delivery)    Encounter for supervision of normal pregnancy, unspecified, first trimester     Encounter for pregnancy related  examination in first trimester    Encounter for supervision of normal pregnancy, unspecified, first trimester     Encounter for pregnancy related examination in first trimester    GERD (gastroesophageal reflux disease)     only with pregnancy    HL (hearing loss)     Kidney stone     Migraine     Other specified health status     No pertinent past medical history    Other specified health status 10/10/2019    No pertinent past surgical history    PCOS (polycystic ovarian syndrome)     Personal history of urinary calculi     History of kidney stones    Polycystic ovary syndrome     Unspecified infection of urinary tract in pregnancy, second trimester ([facility]) 09/26/2019    UTI (urinary tract infection) in pregnancy in second trimester    Urinary tract infection    [2]   Past Surgical History:  Procedure Laterality Date    CYSTOSCOPY      LITHOTRIPSY      MENISCECTOMY Right     OTHER SURGICAL HISTORY  07/23/2020    Oral surgery    OTHER SURGICAL HISTORY  06/10/2019    Repair of bladder    WISDOM TOOTH EXTRACTION     [3]   Allergies  Allergen Reactions    Amoxicillin Anaphylaxis and Hives    Lactose Diarrhea    Penicillins Anaphylaxis and Hives    Prednisone Anaphylaxis and Hives    Raspberry Anaphylaxis    Wheat Hives    Latex Hives   [4]   No medications prior to admission.

## 2025-06-20 ENCOUNTER — ANESTHESIA (OUTPATIENT)
Dept: OPERATING ROOM | Facility: HOSPITAL | Age: 35
End: 2025-06-20
Payer: MEDICAID

## 2025-06-20 ENCOUNTER — APPOINTMENT (OUTPATIENT)
Dept: RADIOLOGY | Facility: HOSPITAL | Age: 35
End: 2025-06-20
Payer: MEDICAID

## 2025-06-20 ENCOUNTER — HOSPITAL ENCOUNTER (OUTPATIENT)
Facility: HOSPITAL | Age: 35
Setting detail: OUTPATIENT SURGERY
Discharge: HOME | End: 2025-06-20
Attending: STUDENT IN AN ORGANIZED HEALTH CARE EDUCATION/TRAINING PROGRAM | Admitting: STUDENT IN AN ORGANIZED HEALTH CARE EDUCATION/TRAINING PROGRAM
Payer: MEDICAID

## 2025-06-20 ENCOUNTER — HOSPITAL ENCOUNTER (OUTPATIENT)
Facility: HOSPITAL | Age: 35
Discharge: HOME | End: 2025-06-21
Attending: OBSTETRICS & GYNECOLOGY | Admitting: OBSTETRICS & GYNECOLOGY
Payer: MEDICAID

## 2025-06-20 VITALS
TEMPERATURE: 97.2 F | DIASTOLIC BLOOD PRESSURE: 85 MMHG | RESPIRATION RATE: 16 BRPM | BODY MASS INDEX: 35.5 KG/M2 | SYSTOLIC BLOOD PRESSURE: 127 MMHG | WEIGHT: 192.9 LBS | HEIGHT: 62 IN | HEART RATE: 76 BPM | OXYGEN SATURATION: 98 %

## 2025-06-20 DIAGNOSIS — N20.2 CALCULUS OF KIDNEY AND URETER: ICD-10-CM

## 2025-06-20 DIAGNOSIS — N20.1 CALCULUS OF URETER: Primary | ICD-10-CM

## 2025-06-20 LAB
ABO GROUP (TYPE) IN BLOOD: NORMAL
ALBUMIN SERPL BCP-MCNC: 3.8 G/DL (ref 3.4–5)
ALP SERPL-CCNC: 79 U/L (ref 33–110)
ALT SERPL W P-5'-P-CCNC: 11 U/L (ref 7–45)
ANION GAP SERPL CALC-SCNC: 16 MMOL/L (ref 10–20)
ANTIBODY SCREEN: NORMAL
APPEARANCE UR: ABNORMAL
AST SERPL W P-5'-P-CCNC: 14 U/L (ref 9–39)
BACTERIA #/AREA URNS AUTO: ABNORMAL /HPF
BASOPHILS # BLD AUTO: 0.02 X10*3/UL (ref 0–0.1)
BASOPHILS NFR BLD AUTO: 0.1 %
BILIRUB SERPL-MCNC: 0.4 MG/DL (ref 0–1.2)
BILIRUB UR STRIP.AUTO-MCNC: NEGATIVE MG/DL
BUN SERPL-MCNC: 9 MG/DL (ref 6–23)
CALCIUM SERPL-MCNC: 8.3 MG/DL (ref 8.6–10.3)
CHLORIDE SERPL-SCNC: 101 MMOL/L (ref 98–107)
CO2 SERPL-SCNC: 19 MMOL/L (ref 21–32)
COLOR UR: ABNORMAL
CREAT SERPL-MCNC: 0.89 MG/DL (ref 0.5–1.05)
EGFRCR SERPLBLD CKD-EPI 2021: 87 ML/MIN/1.73M*2
EOSINOPHIL # BLD AUTO: 0 X10*3/UL (ref 0–0.7)
EOSINOPHIL NFR BLD AUTO: 0 %
ERYTHROCYTE [DISTWIDTH] IN BLOOD BY AUTOMATED COUNT: 13.9 % (ref 11.5–14.5)
GLUCOSE SERPL-MCNC: 98 MG/DL (ref 74–99)
GLUCOSE UR STRIP.AUTO-MCNC: NORMAL MG/DL
HCT VFR BLD AUTO: 35.2 % (ref 36–46)
HGB BLD-MCNC: 11.7 G/DL (ref 12–16)
IMM GRANULOCYTES # BLD AUTO: 0.06 X10*3/UL (ref 0–0.7)
IMM GRANULOCYTES NFR BLD AUTO: 0.4 % (ref 0–0.9)
KETONES UR STRIP.AUTO-MCNC: ABNORMAL MG/DL
LEUKOCYTE ESTERASE UR QL STRIP.AUTO: ABNORMAL
LYMPHOCYTES # BLD AUTO: 0.82 X10*3/UL (ref 1.2–4.8)
LYMPHOCYTES NFR BLD AUTO: 5.6 %
MCH RBC QN AUTO: 28.6 PG (ref 26–34)
MCHC RBC AUTO-ENTMCNC: 33.2 G/DL (ref 32–36)
MCV RBC AUTO: 86 FL (ref 80–100)
MONOCYTES # BLD AUTO: 0.82 X10*3/UL (ref 0.1–1)
MONOCYTES NFR BLD AUTO: 5.6 %
MUCOUS THREADS #/AREA URNS AUTO: ABNORMAL /LPF
NEUTROPHILS # BLD AUTO: 13.02 X10*3/UL (ref 1.2–7.7)
NEUTROPHILS NFR BLD AUTO: 88.3 %
NITRITE UR QL STRIP.AUTO: NEGATIVE
NRBC BLD-RTO: 0 /100 WBCS (ref 0–0)
PH UR STRIP.AUTO: 7 [PH]
PLATELET # BLD AUTO: 224 X10*3/UL (ref 150–450)
POTASSIUM SERPL-SCNC: 3.7 MMOL/L (ref 3.5–5.3)
PROT SERPL-MCNC: 7.2 G/DL (ref 6.4–8.2)
PROT UR STRIP.AUTO-MCNC: ABNORMAL MG/DL
RBC # BLD AUTO: 4.09 X10*6/UL (ref 4–5.2)
RBC # UR STRIP.AUTO: ABNORMAL MG/DL
RBC #/AREA URNS AUTO: >20 /HPF
RH FACTOR (ANTIGEN D): NORMAL
SODIUM SERPL-SCNC: 132 MMOL/L (ref 136–145)
SP GR UR STRIP.AUTO: 1.01
SQUAMOUS #/AREA URNS AUTO: ABNORMAL /HPF
UROBILINOGEN UR STRIP.AUTO-MCNC: NORMAL MG/DL
WBC # BLD AUTO: 14.7 X10*3/UL (ref 4.4–11.3)
WBC #/AREA URNS AUTO: >50 /HPF

## 2025-06-20 PROCEDURE — 36415 COLL VENOUS BLD VENIPUNCTURE: CPT | Performed by: STUDENT IN AN ORGANIZED HEALTH CARE EDUCATION/TRAINING PROGRAM

## 2025-06-20 PROCEDURE — 7100000002 HC RECOVERY ROOM TIME - EACH INCREMENTAL 1 MINUTE: Performed by: STUDENT IN AN ORGANIZED HEALTH CARE EDUCATION/TRAINING PROGRAM

## 2025-06-20 PROCEDURE — 81001 URINALYSIS AUTO W/SCOPE: CPT | Performed by: OBSTETRICS & GYNECOLOGY

## 2025-06-20 PROCEDURE — 2500000005 HC RX 250 GENERAL PHARMACY W/O HCPCS: Mod: SE | Performed by: STUDENT IN AN ORGANIZED HEALTH CARE EDUCATION/TRAINING PROGRAM

## 2025-06-20 PROCEDURE — 3600000003 HC OR TIME - INITIAL BASE CHARGE - PROCEDURE LEVEL THREE: Performed by: STUDENT IN AN ORGANIZED HEALTH CARE EDUCATION/TRAINING PROGRAM

## 2025-06-20 PROCEDURE — C1769 GUIDE WIRE: HCPCS | Performed by: STUDENT IN AN ORGANIZED HEALTH CARE EDUCATION/TRAINING PROGRAM

## 2025-06-20 PROCEDURE — 86900 BLOOD TYPING SEROLOGIC ABO: CPT | Performed by: STUDENT IN AN ORGANIZED HEALTH CARE EDUCATION/TRAINING PROGRAM

## 2025-06-20 PROCEDURE — 4500999001 HC ED NO CHARGE

## 2025-06-20 PROCEDURE — 2500000004 HC RX 250 GENERAL PHARMACY W/ HCPCS (ALT 636 FOR OP/ED): Mod: SE | Performed by: STUDENT IN AN ORGANIZED HEALTH CARE EDUCATION/TRAINING PROGRAM

## 2025-06-20 PROCEDURE — 59020 FETAL CONTRACT STRESS TEST: CPT

## 2025-06-20 PROCEDURE — 3700000002 HC GENERAL ANESTHESIA TIME - EACH INCREMENTAL 1 MINUTE: Performed by: STUDENT IN AN ORGANIZED HEALTH CARE EDUCATION/TRAINING PROGRAM

## 2025-06-20 PROCEDURE — 52356 CYSTO/URETERO W/LITHOTRIPSY: CPT | Performed by: STUDENT IN AN ORGANIZED HEALTH CARE EDUCATION/TRAINING PROGRAM

## 2025-06-20 PROCEDURE — 7100000001 HC RECOVERY ROOM TIME - INITIAL BASE CHARGE: Performed by: STUDENT IN AN ORGANIZED HEALTH CARE EDUCATION/TRAINING PROGRAM

## 2025-06-20 PROCEDURE — 3600000008 HC OR TIME - EACH INCREMENTAL 1 MINUTE - PROCEDURE LEVEL THREE: Performed by: STUDENT IN AN ORGANIZED HEALTH CARE EDUCATION/TRAINING PROGRAM

## 2025-06-20 PROCEDURE — 36415 COLL VENOUS BLD VENIPUNCTURE: CPT | Performed by: OBSTETRICS & GYNECOLOGY

## 2025-06-20 PROCEDURE — 7100000009 HC PHASE TWO TIME - INITIAL BASE CHARGE: Performed by: STUDENT IN AN ORGANIZED HEALTH CARE EDUCATION/TRAINING PROGRAM

## 2025-06-20 PROCEDURE — C1747 HC OR 272 NO HCPCS: HCPCS | Performed by: STUDENT IN AN ORGANIZED HEALTH CARE EDUCATION/TRAINING PROGRAM

## 2025-06-20 PROCEDURE — 99252 IP/OBS CONSLTJ NEW/EST SF 35: CPT

## 2025-06-20 PROCEDURE — 2500000001 HC RX 250 WO HCPCS SELF ADMINISTERED DRUGS (ALT 637 FOR MEDICARE OP): Performed by: OBSTETRICS & GYNECOLOGY

## 2025-06-20 PROCEDURE — 84075 ASSAY ALKALINE PHOSPHATASE: CPT | Performed by: OBSTETRICS & GYNECOLOGY

## 2025-06-20 PROCEDURE — 2500000002 HC RX 250 W HCPCS SELF ADMINISTERED DRUGS (ALT 637 FOR MEDICARE OP, ALT 636 FOR OP/ED): Performed by: OBSTETRICS & GYNECOLOGY

## 2025-06-20 PROCEDURE — 2500000004 HC RX 250 GENERAL PHARMACY W/ HCPCS (ALT 636 FOR OP/ED): Mod: SE

## 2025-06-20 PROCEDURE — 3700000001 HC GENERAL ANESTHESIA TIME - INITIAL BASE CHARGE: Performed by: STUDENT IN AN ORGANIZED HEALTH CARE EDUCATION/TRAINING PROGRAM

## 2025-06-20 PROCEDURE — 99214 OFFICE O/P EST MOD 30 MIN: CPT | Performed by: OBSTETRICS & GYNECOLOGY

## 2025-06-20 PROCEDURE — 2500000004 HC RX 250 GENERAL PHARMACY W/ HCPCS (ALT 636 FOR OP/ED): Performed by: OBSTETRICS & GYNECOLOGY

## 2025-06-20 PROCEDURE — 2720000007 HC OR 272 NO HCPCS: Performed by: STUDENT IN AN ORGANIZED HEALTH CARE EDUCATION/TRAINING PROGRAM

## 2025-06-20 PROCEDURE — 59025 FETAL NON-STRESS TEST: CPT | Performed by: OBSTETRICS & GYNECOLOGY

## 2025-06-20 PROCEDURE — 85025 COMPLETE CBC W/AUTO DIFF WBC: CPT | Performed by: OBSTETRICS & GYNECOLOGY

## 2025-06-20 PROCEDURE — 7100000010 HC PHASE TWO TIME - EACH INCREMENTAL 1 MINUTE: Performed by: STUDENT IN AN ORGANIZED HEALTH CARE EDUCATION/TRAINING PROGRAM

## 2025-06-20 RX ORDER — FAMOTIDINE 10 MG/ML
INJECTION, SOLUTION INTRAVENOUS AS NEEDED
Status: DISCONTINUED | OUTPATIENT
Start: 2025-06-20 | End: 2025-06-20

## 2025-06-20 RX ORDER — ACETAMINOPHEN 325 MG/1
975 TABLET ORAL EVERY 6 HOURS PRN
Status: DISCONTINUED | OUTPATIENT
Start: 2025-06-20 | End: 2025-06-21 | Stop reason: HOSPADM

## 2025-06-20 RX ORDER — ACETAMINOPHEN 500 MG
1000 TABLET ORAL EVERY 6 HOURS PRN
Qty: 30 TABLET | Refills: 0 | Status: SHIPPED | OUTPATIENT
Start: 2025-06-20

## 2025-06-20 RX ORDER — CEFAZOLIN 1 G/1
INJECTION, POWDER, FOR SOLUTION INTRAVENOUS AS NEEDED
Status: DISCONTINUED | OUTPATIENT
Start: 2025-06-20 | End: 2025-06-20

## 2025-06-20 RX ORDER — ONDANSETRON HYDROCHLORIDE 2 MG/ML
4 INJECTION, SOLUTION INTRAVENOUS EVERY 6 HOURS PRN
Status: DISCONTINUED | OUTPATIENT
Start: 2025-06-20 | End: 2025-06-21 | Stop reason: HOSPADM

## 2025-06-20 RX ORDER — HYDROMORPHONE HYDROCHLORIDE 0.2 MG/ML
0.2 INJECTION INTRAMUSCULAR; INTRAVENOUS; SUBCUTANEOUS EVERY 5 MIN PRN
Status: DISCONTINUED | OUTPATIENT
Start: 2025-06-20 | End: 2025-06-20 | Stop reason: HOSPADM

## 2025-06-20 RX ORDER — WATER 1 ML/ML
INJECTION IRRIGATION AS NEEDED
Status: DISCONTINUED | OUTPATIENT
Start: 2025-06-20 | End: 2025-06-20 | Stop reason: HOSPADM

## 2025-06-20 RX ORDER — OXYCODONE HYDROCHLORIDE 5 MG/1
10 TABLET ORAL EVERY 4 HOURS PRN
Status: DISCONTINUED | OUTPATIENT
Start: 2025-06-20 | End: 2025-06-20 | Stop reason: HOSPADM

## 2025-06-20 RX ORDER — ONDANSETRON 4 MG/1
4 TABLET, FILM COATED ORAL EVERY 6 HOURS PRN
Status: DISCONTINUED | OUTPATIENT
Start: 2025-06-20 | End: 2025-06-21 | Stop reason: HOSPADM

## 2025-06-20 RX ORDER — SODIUM CHLORIDE, SODIUM LACTATE, POTASSIUM CHLORIDE, CALCIUM CHLORIDE 600; 310; 30; 20 MG/100ML; MG/100ML; MG/100ML; MG/100ML
INJECTION, SOLUTION INTRAVENOUS CONTINUOUS PRN
Status: DISCONTINUED | OUTPATIENT
Start: 2025-06-20 | End: 2025-06-20

## 2025-06-20 RX ORDER — PHENYLEPHRINE HCL IN 0.9% NACL 0.4MG/10ML
SYRINGE (ML) INTRAVENOUS AS NEEDED
Status: DISCONTINUED | OUTPATIENT
Start: 2025-06-20 | End: 2025-06-20

## 2025-06-20 RX ORDER — SUCCINYLCHOLINE CHLORIDE 20 MG/ML
INJECTION INTRAMUSCULAR; INTRAVENOUS AS NEEDED
Status: DISCONTINUED | OUTPATIENT
Start: 2025-06-20 | End: 2025-06-20

## 2025-06-20 RX ORDER — LIDOCAINE HYDROCHLORIDE 10 MG/ML
0.5 INJECTION, SOLUTION EPIDURAL; INFILTRATION; INTRACAUDAL; PERINEURAL ONCE AS NEEDED
Status: DISCONTINUED | OUTPATIENT
Start: 2025-06-20 | End: 2025-06-21 | Stop reason: HOSPADM

## 2025-06-20 RX ORDER — REMIFENTANIL HYDROCHLORIDE 1 MG/ML
INJECTION, POWDER, LYOPHILIZED, FOR SOLUTION INTRAVENOUS CONTINUOUS PRN
Status: DISCONTINUED | OUTPATIENT
Start: 2025-06-20 | End: 2025-06-20

## 2025-06-20 RX ORDER — OXYCODONE HYDROCHLORIDE 5 MG/1
5 TABLET ORAL EVERY 6 HOURS PRN
Qty: 7 TABLET | Refills: 0 | Status: SHIPPED | OUTPATIENT
Start: 2025-06-20 | End: 2025-06-21 | Stop reason: HOSPADM

## 2025-06-20 RX ORDER — NITROFURANTOIN 25; 75 MG/1; MG/1
100 CAPSULE ORAL 2 TIMES DAILY
Status: DISCONTINUED | OUTPATIENT
Start: 2025-06-20 | End: 2025-06-21 | Stop reason: HOSPADM

## 2025-06-20 RX ORDER — OXYCODONE HYDROCHLORIDE 5 MG/1
5 TABLET ORAL EVERY 4 HOURS PRN
Status: DISCONTINUED | OUTPATIENT
Start: 2025-06-20 | End: 2025-06-20 | Stop reason: HOSPADM

## 2025-06-20 RX ORDER — SODIUM CHLORIDE, SODIUM LACTATE, POTASSIUM CHLORIDE, CALCIUM CHLORIDE 600; 310; 30; 20 MG/100ML; MG/100ML; MG/100ML; MG/100ML
125 INJECTION, SOLUTION INTRAVENOUS CONTINUOUS
Status: DISCONTINUED | OUTPATIENT
Start: 2025-06-20 | End: 2025-06-21 | Stop reason: HOSPADM

## 2025-06-20 RX ORDER — HYDROMORPHONE HYDROCHLORIDE 1 MG/ML
INJECTION, SOLUTION INTRAMUSCULAR; INTRAVENOUS; SUBCUTANEOUS CONTINUOUS PRN
Status: DISCONTINUED | OUTPATIENT
Start: 2025-06-20 | End: 2025-06-20

## 2025-06-20 RX ORDER — FENTANYL CITRATE 50 UG/ML
INJECTION, SOLUTION INTRAMUSCULAR; INTRAVENOUS AS NEEDED
Status: DISCONTINUED | OUTPATIENT
Start: 2025-06-20 | End: 2025-06-20

## 2025-06-20 RX ORDER — VANCOMYCIN HYDROCHLORIDE 1 G/20ML
INJECTION, POWDER, LYOPHILIZED, FOR SOLUTION INTRAVENOUS AS NEEDED
Status: DISCONTINUED | OUTPATIENT
Start: 2025-06-20 | End: 2025-06-20

## 2025-06-20 RX ORDER — ONDANSETRON HYDROCHLORIDE 2 MG/ML
INJECTION, SOLUTION INTRAVENOUS AS NEEDED
Status: DISCONTINUED | OUTPATIENT
Start: 2025-06-20 | End: 2025-06-20

## 2025-06-20 RX ORDER — ONDANSETRON HYDROCHLORIDE 2 MG/ML
4 INJECTION, SOLUTION INTRAVENOUS ONCE AS NEEDED
Status: COMPLETED | OUTPATIENT
Start: 2025-06-20 | End: 2025-06-20

## 2025-06-20 RX ORDER — ALBUTEROL SULFATE 1.25 MG/3ML
1.25 SOLUTION RESPIRATORY (INHALATION) EVERY 6 HOURS PRN
COMMUNITY

## 2025-06-20 RX ORDER — LABETALOL HYDROCHLORIDE 5 MG/ML
20 INJECTION, SOLUTION INTRAVENOUS ONCE AS NEEDED
Status: DISCONTINUED | OUTPATIENT
Start: 2025-06-20 | End: 2025-06-21 | Stop reason: HOSPADM

## 2025-06-20 RX ORDER — DROPERIDOL 2.5 MG/ML
0.62 INJECTION, SOLUTION INTRAMUSCULAR; INTRAVENOUS ONCE AS NEEDED
Status: DISCONTINUED | OUTPATIENT
Start: 2025-06-20 | End: 2025-06-20 | Stop reason: HOSPADM

## 2025-06-20 RX ORDER — ACETAMINOPHEN 325 MG/1
650 TABLET ORAL EVERY 4 HOURS PRN
Status: DISCONTINUED | OUTPATIENT
Start: 2025-06-20 | End: 2025-06-20 | Stop reason: HOSPADM

## 2025-06-20 RX ORDER — HYDRALAZINE HYDROCHLORIDE 20 MG/ML
5 INJECTION INTRAMUSCULAR; INTRAVENOUS ONCE AS NEEDED
Status: DISCONTINUED | OUTPATIENT
Start: 2025-06-20 | End: 2025-06-21 | Stop reason: HOSPADM

## 2025-06-20 RX ORDER — PROPOFOL 10 MG/ML
INJECTION, EMULSION INTRAVENOUS AS NEEDED
Status: DISCONTINUED | OUTPATIENT
Start: 2025-06-20 | End: 2025-06-20

## 2025-06-20 RX ADMIN — CEFAZOLIN 2 G: 1 INJECTION, POWDER, FOR SOLUTION INTRAMUSCULAR; INTRAVENOUS at 09:15

## 2025-06-20 RX ADMIN — Medication 80 MCG: at 09:19

## 2025-06-20 RX ADMIN — DEXAMETHASONE SODIUM PHOSPHATE 4 MG: 4 INJECTION INTRA-ARTICULAR; INTRALESIONAL; INTRAMUSCULAR; INTRAVENOUS; SOFT TISSUE at 09:27

## 2025-06-20 RX ADMIN — SODIUM CHLORIDE, POTASSIUM CHLORIDE, SODIUM LACTATE AND CALCIUM CHLORIDE: 600; 310; 30; 20 INJECTION, SOLUTION INTRAVENOUS at 08:56

## 2025-06-20 RX ADMIN — FENTANYL CITRATE 100 MCG: 50 INJECTION, SOLUTION INTRAMUSCULAR; INTRAVENOUS at 09:03

## 2025-06-20 RX ADMIN — PROMETHAZINE HYDROCHLORIDE 12.5 MG: 25 INJECTION INTRAMUSCULAR; INTRAVENOUS at 21:41

## 2025-06-20 RX ADMIN — PROPOFOL 200 MG: 10 INJECTION, EMULSION INTRAVENOUS at 09:03

## 2025-06-20 RX ADMIN — NITROFURANTOIN MONOHYDRATE/MACROCRYSTALLINE 100 MG: 25; 75 CAPSULE ORAL at 23:29

## 2025-06-20 RX ADMIN — SODIUM CHLORIDE, SODIUM LACTATE, POTASSIUM CHLORIDE, AND CALCIUM CHLORIDE 125 ML/HR: .6; .31; .03; .02 INJECTION, SOLUTION INTRAVENOUS at 23:15

## 2025-06-20 RX ADMIN — ONDANSETRON 4 MG: 2 INJECTION INTRAMUSCULAR; INTRAVENOUS at 08:31

## 2025-06-20 RX ADMIN — Medication 80 MCG: at 09:37

## 2025-06-20 RX ADMIN — SUCCINYLCHOLINE CHLORIDE 140 MG: 20 INJECTION, SOLUTION INTRAMUSCULAR; INTRAVENOUS at 09:03

## 2025-06-20 RX ADMIN — SODIUM CHLORIDE, SODIUM LACTATE, POTASSIUM CHLORIDE, AND CALCIUM CHLORIDE 125 ML/HR: .6; .31; .03; .02 INJECTION, SOLUTION INTRAVENOUS at 20:55

## 2025-06-20 RX ADMIN — REMIFENTANIL HYDROCHLORIDE 0.1 MCG/KG/MIN: 1 INJECTION, POWDER, LYOPHILIZED, FOR SOLUTION INTRAVENOUS at 09:07

## 2025-06-20 RX ADMIN — ONDANSETRON 4 MG: 2 INJECTION INTRAMUSCULAR; INTRAVENOUS at 10:38

## 2025-06-20 RX ADMIN — Medication 80 MCG: at 09:27

## 2025-06-20 RX ADMIN — VANCOMYCIN HYDROCHLORIDE 1 G: 1025 INJECTION, POWDER, FOR SOLUTION INTRAVENOUS; ORAL at 09:07

## 2025-06-20 RX ADMIN — Medication 80 MCG: at 09:47

## 2025-06-20 RX ADMIN — ACETAMINOPHEN 975 MG: 325 TABLET ORAL at 23:29

## 2025-06-20 RX ADMIN — FAMOTIDINE 20 MG: 10 INJECTION INTRAVENOUS at 08:31

## 2025-06-20 SDOH — HEALTH STABILITY: MENTAL HEALTH: SUICIDAL BEHAVIOR (LIFETIME): NO

## 2025-06-20 SDOH — SOCIAL STABILITY: SOCIAL INSECURITY: DO YOU FEEL UNSAFE GOING BACK TO THE PLACE WHERE YOU LIVE?: NO

## 2025-06-20 SDOH — HEALTH STABILITY: MENTAL HEALTH: NON-SPECIFIC ACTIVE SUICIDAL THOUGHTS (PAST 1 MONTH): NO

## 2025-06-20 SDOH — SOCIAL STABILITY: SOCIAL INSECURITY: VERBAL ABUSE: DENIES

## 2025-06-20 SDOH — HEALTH STABILITY: MENTAL HEALTH: WISH TO BE DEAD (PAST 1 MONTH): NO

## 2025-06-20 SDOH — SOCIAL STABILITY: SOCIAL INSECURITY: PHYSICAL ABUSE: DENIES

## 2025-06-20 SDOH — SOCIAL STABILITY: SOCIAL INSECURITY: HAVE YOU HAD ANY THOUGHTS OF HARMING ANYONE ELSE?: NO

## 2025-06-20 SDOH — SOCIAL STABILITY: SOCIAL INSECURITY: HAVE YOU HAD THOUGHTS OF HARMING ANYONE ELSE?: NO

## 2025-06-20 SDOH — ECONOMIC STABILITY: HOUSING INSECURITY: DO YOU FEEL UNSAFE GOING BACK TO THE PLACE WHERE YOU ARE LIVING?: NO

## 2025-06-20 SDOH — SOCIAL STABILITY: SOCIAL INSECURITY: DO YOU FEEL ANYONE HAS EXPLOITED OR TAKEN ADVANTAGE OF YOU FINANCIALLY OR OF YOUR PERSONAL PROPERTY?: NO

## 2025-06-20 SDOH — HEALTH STABILITY: MENTAL HEALTH: HAVE YOU USED ANY SUBSTANCES (CANABIS, COCAINE, HEROIN, HALLUCINOGENS, INHALANTS, ETC.) IN THE PAST 12 MONTHS?: NO

## 2025-06-20 SDOH — SOCIAL STABILITY: SOCIAL INSECURITY: ABUSE SCREEN: ADULT

## 2025-06-20 SDOH — SOCIAL STABILITY: SOCIAL INSECURITY: ARE THERE ANY APPARENT SIGNS OF INJURIES/BEHAVIORS THAT COULD BE RELATED TO ABUSE/NEGLECT?: NO

## 2025-06-20 SDOH — HEALTH STABILITY: MENTAL HEALTH: WERE YOU ABLE TO COMPLETE ALL THE BEHAVIORAL HEALTH SCREENINGS?: YES

## 2025-06-20 SDOH — SOCIAL STABILITY: SOCIAL INSECURITY: DOES ANYONE TRY TO KEEP YOU FROM HAVING/CONTACTING OTHER FRIENDS OR DOING THINGS OUTSIDE YOUR HOME?: NO

## 2025-06-20 SDOH — SOCIAL STABILITY: SOCIAL INSECURITY
ASK PARENT OR GUARDIAN: ARE THERE TIMES WHEN YOU, YOUR CHILD(REN), OR ANY MEMBER OF YOUR HOUSEHOLD FEEL UNSAFE, HARMED, OR THREATENED AROUND PERSONS WITH WHOM YOU KNOW OR LIVE?: NO

## 2025-06-20 SDOH — SOCIAL STABILITY: SOCIAL INSECURITY: ARE YOU OR HAVE YOU BEEN THREATENED OR ABUSED PHYSICALLY, EMOTIONALLY, OR SEXUALLY BY ANYONE?: NO

## 2025-06-20 SDOH — SOCIAL STABILITY: SOCIAL INSECURITY: HAS ANYONE EVER THREATENED TO HURT YOUR FAMILY OR YOUR PETS?: NO

## 2025-06-20 SDOH — HEALTH STABILITY: MENTAL HEALTH: HAVE YOU USED ANY PRESCRIPTION DRUGS OTHER THAN PRESCRIBED IN THE PAST 12 MONTHS?: NO

## 2025-06-20 ASSESSMENT — LIFESTYLE VARIABLES
HOW OFTEN DO YOU HAVE A DRINK CONTAINING ALCOHOL: NEVER
HOW MANY STANDARD DRINKS CONTAINING ALCOHOL DO YOU HAVE ON A TYPICAL DAY: PATIENT DOES NOT DRINK
AUDIT-C TOTAL SCORE: 0
SKIP TO QUESTIONS 9-10: 1
AUDIT-C TOTAL SCORE: 0
HOW OFTEN DO YOU HAVE 6 OR MORE DRINKS ON ONE OCCASION: NEVER

## 2025-06-20 ASSESSMENT — PAIN - FUNCTIONAL ASSESSMENT
PAIN_FUNCTIONAL_ASSESSMENT: 0-10

## 2025-06-20 ASSESSMENT — PATIENT HEALTH QUESTIONNAIRE - PHQ9
2. FEELING DOWN, DEPRESSED OR HOPELESS: NOT AT ALL
1. LITTLE INTEREST OR PLEASURE IN DOING THINGS: NOT AT ALL
SUM OF ALL RESPONSES TO PHQ9 QUESTIONS 1 & 2: 0

## 2025-06-20 ASSESSMENT — PAIN SCALES - GENERAL
PAINLEVEL_OUTOF10: 8
PAINLEVEL_OUTOF10: 0 - NO PAIN
PAINLEVEL_OUTOF10: 8
PAINLEVEL_OUTOF10: 0 - NO PAIN
PAIN_LEVEL: 0
PAINLEVEL_OUTOF10: 0 - NO PAIN
PAINLEVEL_OUTOF10: 0 - NO PAIN

## 2025-06-20 ASSESSMENT — COLUMBIA-SUICIDE SEVERITY RATING SCALE - C-SSRS
1. IN THE PAST MONTH, HAVE YOU WISHED YOU WERE DEAD OR WISHED YOU COULD GO TO SLEEP AND NOT WAKE UP?: NO
6. HAVE YOU EVER DONE ANYTHING, STARTED TO DO ANYTHING, OR PREPARED TO DO ANYTHING TO END YOUR LIFE?: NO
2. HAVE YOU ACTUALLY HAD ANY THOUGHTS OF KILLING YOURSELF?: NO

## 2025-06-20 ASSESSMENT — PAIN DESCRIPTION - DESCRIPTORS: DESCRIPTORS: SPASM

## 2025-06-20 ASSESSMENT — ACTIVITIES OF DAILY LIVING (ADL): LACK_OF_TRANSPORTATION: NO

## 2025-06-20 NOTE — ANESTHESIA PREPROCEDURE EVALUATION
"Patient: Breanna Jackson \"Netta\"      Procedure Information       Date/Time: 06/20/25 0700    Procedures:       CYSTOSCOPY, WITH LASER LITHOTRIPSY (Left)      CYSTOSCOPY, WITH URETERAL STENT REMOVAL **PAT ON ADMIT** (Left)    Location: [facility] OR  / [facility] OR    Surgeons: Maverick Seaman MD            Relevant Problems   Cardiac   (+) Hyperlipidemia      Pulmonary   (+) OSMEL (obstructive sleep apnea)      Neuro   (+) Lumbar radiculopathy, acute   (+) Meralgia paresthetica of right side   (+) Meralgia paresthetica, left   (+) Sciatica of left side      /Renal   (+) Calculus of kidney and ureter   (+) UTI (urinary tract infection)      Liver   (+) Nonalcoholic fatty liver disease      Endocrine   (+) Class 1 obesity with body mass index (BMI) of 34.0 to 34.9 in adult   (+) Class 2 obesity with body mass index (BMI) of 37.0 to 37.9 in adult   (+) Enlarged thyroid   (+) Obesity      Hematology   (+) Von Willebrand disease (Multi)      HEENT   (+) Hearing loss on right   (+) 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 : N  Date of Last Liquid: 06/19/25  Time of Last Liquid: 2100  Date of Last Solid: 06/19/25  Time of Last Solid: 2100  Last Intake Type: Clear fluids  Time of Last Void: 0612         OB/Gyn Evaluation    Present Pregnancy    Patient is pregnant now.   Obstetric History                Physical Exam    Airway  Mallampati: II  TM distance: >3 FB  Neck ROM: " full  Mouth opening: 3 or more finger widths     Cardiovascular   Rhythm: regular  Rate: normal     Dental - normal exam     Pulmonary Breath sounds clear to auscultation     Abdominal            Anesthesia Plan    History of general anesthesia?: yes  History of complications of general anesthesia?: no    ASA 3     general   (RSI)  intravenous induction   Postoperative pain plan includes opioids.  Trial extubation is planned.  Anesthetic plan and risks discussed with patient.  Use of blood products discussed with patient who consented to blood products.    Plan discussed with CAA.      Plan for general anesthesia given unknown coagulopathy associated with vWD. After discussing with patient, patient agreeable to possible blood product transfusion if needed. Will have intra-op fetal monitoring, blood available, OB on standby.

## 2025-06-20 NOTE — ANESTHESIA POSTPROCEDURE EVALUATION
"Patient: Breanna Jackson \"Netta\"    Procedure Summary       Date: 06/20/25 Room / Location: [facility] OR 04 / Virtual [facility]    Anesthesia Start: 0858 Anesthesia Stop: 1018    Procedures:       CYSTOSCOPY, WITH LASER LITHOTRIPSY (Left)      CYSTOSCOPY, WITH URETERAL STENT REMOVAL **PAT ON ADMIT** (Left) Diagnosis:       Calculus of ureter      (Calculus of ureter [N20.1])    Surgeons: Maverick Seaman MD Responsible Provider: Kris Zhang MD    Anesthesia Type: general ASA Status: 3            Anesthesia Type: general    Vitals Value Taken Time   /88 06/20/25 11:45   Temp 36.2 °C (97.2 °F) 06/20/25 11:45   Pulse 76 06/20/25 11:45   Resp 18 06/20/25 11:45   SpO2 98 % 06/20/25 11:45       Anesthesia Post Evaluation    Patient location during evaluation: PACU  Patient participation: complete - patient participated  Level of consciousness: awake  Pain score: 0  Pain management: adequate  Airway patency: patent  Cardiovascular status: acceptable  Respiratory status: acceptable, airway suctioned and face mask  Hydration status: acceptable  Postoperative Nausea and Vomiting: none        No notable events documented.    "

## 2025-06-20 NOTE — OP NOTE
"CYSTOSCOPY, WITH LASER LITHOTRIPSY (L), CYSTOSCOPY, WITH URETERAL STENT REMOVAL **PAT ON ADMIT** (L) Operative Note     Date: 2025  OR Location: [facility] OR    Name: Breanna Jackson \"Sheldon", : 1990, Age: 35 y.o., MRN: [id number], Sex: female    Diagnosis  Pre-op Diagnosis      * Calculus of ureter [N20.1] Post-op Diagnosis     * Calculus of ureter [N20.1]     Procedures  CYSTOSCOPY, WITH LASER LITHOTRIPSY  [id number] - ID CYSTO W/URETEROSCOPY W/LITHOTRIPSY    CYSTOSCOPY, WITH URETERAL STENT REMOVAL **PAT ON ADMIT**  [id number] - ID CYSTO W/SIMPLE REMOVAL STONE & STENT      Surgeons      * Maverick Seaman - Primary    Resident/Fellow/Other Assistant:  Surgeons and Role:  * No surgeons found with a matching role *    Staff:   Scrub Person:   Scrub Person: Ann  Circulator: Shoshana  Circulator: Kalani    Anesthesia Staff: Anesthesiologist: Kris Zhang MD  C-AA: ROMELIA Cabrera    Procedure Summary  Anesthesia: General  ASA: III  Estimated Blood Loss: None  Intra-op Medications:   Administrations occurring from 0700 to 0900 on 25:   Medication Name Total Dose   famotidine PF (Pepcid) injection 20 mg   LR infusion Cannot be calculated   ondansetron (Zofran) 2 mg/mL injection 4 mg              Anesthesia Record               Intraprocedure I/O Totals          Intake    Remifentanil Drip 0.00 mL    The total shown is the total volume documented since Anesthesia Start was filed.    Total Intake 0 mL          Specimen: No specimens collected              Drains and/or Catheters:   Ureteral Drain/Stent Left ureter 24 Fr. (Active)       Findings: Left JJ stent removed - no ureteral stones identified - 3-4mm lower pole left renal stone treated completely with laser    Indications: Breanna Jackson \"Sheldon" is an 35 y.o. female who is having surgery for Calculus of ureter [N20.1].     The patient was seen in the preoperative area. The risks, benefits, complications, treatment options, non-operative alternatives, expected " recovery and outcomes were discussed with the patient. The possibilities of reaction to medication, pulmonary aspiration, injury to surrounding structures, bleeding, recurrent infection, the need for additional procedures, failure to diagnose a condition, and creating a complication requiring transfusion or operation were discussed with the patient. The patient concurred with the proposed plan, giving informed consent.  The site of surgery was properly noted/marked if necessary per policy. The patient has been actively warmed in preoperative area. Preoperative antibiotics have been ordered and given within 1 hours of incision. Venous thrombosis prophylaxis have been ordered including bilateral sequential compression devices and chemical prophylaxis    Procedure Details:   Patient consented to procedure in preoperative area. Risks and benefits discussed. Allergies were reviewed and preoperative antibiotics were administered. Patient was brought to the operating room and placed in supine position on the operating room table. A timeout was performed. All were in agreement.     Patient underwent general anesthesia without complication. They were repositioned in dorsal lithotomy and prepped and draped in the usual sterile fashion. A 21 Fr rigid cystoscope was used to perform cystourethroscopy. Urethra was normal. The bladder mucosa was unremarkable and the ureteral orifices were in normal orthotopic position. No masses or stones were identified. An indwelling stent coming out of the left UO was identified. A grasper was inserted and the distal end of the stent was grabbed and pulled to the level of the urethral meatus. A hybrid guide wire was used to cannulate the stent and was advanced all the way until resistance was met. The stent was subsequently removed and the guide wire was secured in place as safety. A semi-rigid ureteroscope was advanced alongside the wire into the left ureter. The ureter was inspected in its  entirety without evidence of any ureteral stone. A second wire was advanced into the L renal pelvis. At this point, we decided to switch to a flexible ureteroscope. We advanced the flexible scope over the guide wire all way into the renal pelvis. All renal poles were inspected carefully and we were able to identify a small 3-4mm stone in the lower pole. This stone was treated completely with laser. Scattered thin debris were visualized in the upper and midpole. Pan pyeloscopy was again performed and demonstrated satisfactory treatment of stone fragments. The ureteroscope was carefully backed out of the ureter, ensuring no stone fragments. The ureter looked intact without signs of trauma so we decided that a ureteral stent was not indicated.      All instruments were removed. This concluded the procedure.     Evidence of Infection: No   Complications:  None; patient tolerated the procedure well.    Disposition: PACU - hemodynamically stable.  Condition: stable       Attending Attestation:     Maverick Seaman  Phone Number: [phone]

## 2025-06-20 NOTE — DISCHARGE INSTRUCTIONS
DEPARTMENT OF Urology  DISCHARGE INSTRUCTIONS Ureteroscopy Laser Lithotripsy  Outpatient Surgery    C O N F I D E N T I A L   I N F O R M A T I O N    Breanna Jackson    Call [phone] during regular daytime business hours (8:00 am - 5:00 pm) and after 5:00 pm ask for the Urology resident with any questions or concerns.    If it is a life-threatening situation, proceed to the nearest emergency department.        Follow-up appointment:  7/10/2025      Thank you for the opportunity to care for you today.  Your health and healing are very important to us.  We hope we made you feel as comfortable as possible and are committed to your recovery and continued well-being.      The following is a brief overview of your Ureteroscopy Laser Lithotripsy procedure today. Some of the information contained on this summary may be confidential.  This information should be kept in your records and should be shared with your regular doctor.    Physicians:   Dr. Seaman    Procedure performed: Lasering of your kidney stone    What to Expect During your Recovery and Home Care  Anesthesia Side Effects   You received anesthesia today.  You may feel sleepy, tired, or have a sore throat.   You may also feel drowsiness, dizziness, or inability to think clearly.  For your safety, do not drive, drink alcoholic beverages, take any unprescribed medication or make any important decisions for 24 hours.  A responsible adult should be with you for 24 hours.        Activity and Recovery    No heavy lifting today. Rest for the next 24 hours.    Pain Control  Do not take more than 4,000mg of Tylenol in a 24-hour period.      Nausea/Vomiting   Clear liquids are best tolerated at first. Start slow, advance your diet as tolerated to normal foods. Avoid spicy, greasy, heavy foods at first. Also, you may feel nauseous or like you need to vomit if you take any type of medication on an empty stomach.  Call your physician if you are unable to eat or drink  and have persistent vomiting.    Signs of Bleeding   You could have some blood in your urine off and on over the next several weeks. Your urine will be light pink to yellow.    Treatment/wound care:   It is okay to shower 24 hours after time of surgery.    Signs of Infection  Signs of infection can include fever, chills, burning sensation with passing of urine, or severe abdominal pain.  If you see any of these occur, please contact your doctor's office at [phone].  Any fever higher than 100.4, especially if associated with an ill feeling, abdominal pain, chills, or nausea should be reported to your surgeon.      Assist in bowel movements/urination  Increase fiber in diet  Increase water (6 to 8 glasses)  Increase walking   Urination should occur within 6 hours of anesthesia  If you have tried these methods and your bladder still feels full and you cannot use the bathroom, please go to your nearest Emergency room/contact your physician.

## 2025-06-20 NOTE — ANESTHESIA PROCEDURE NOTES
Airway  Date/Time: 6/20/2025 9:07 AM  Reason: elective    Airway not difficult    Staffing  Performed: ROMELIA   Authorized by: ROMELIA Cabrera    Performed by: ROMELIA Cabrera  Patient location during procedure: OR    Patient Condition  Indications for airway management: anesthesia  Patient position: sniffing  MILS maintained throughout  Sedation level: deep     Final Airway Details   Preoxygenated: yes  Final airway type: endotracheal airway  Successful airway: ETT  Cuffed: yes   Successful intubation technique: video laryngoscopy  Adjuncts used in placement: intubating stylet  Endotracheal tube insertion site: oral  Blade: Belén  Blade size: #3  ETT size (mm): 6.5  Cormack-Lehane Classification: grade I - full view of glottis  Placement verified by: chest auscultation and capnometry   Measured from: lips  ETT to lips (cm): 21  Number of attempts at approach: 1

## 2025-06-21 ENCOUNTER — HOSPITAL ENCOUNTER (OUTPATIENT)
Facility: HOSPITAL | Age: 35
End: 2025-06-21
Attending: OBSTETRICS & GYNECOLOGY | Admitting: OBSTETRICS & GYNECOLOGY
Payer: MEDICAID

## 2025-06-21 VITALS
HEART RATE: 89 BPM | OXYGEN SATURATION: 98 % | WEIGHT: 193.45 LBS | HEIGHT: 62 IN | TEMPERATURE: 97.9 F | RESPIRATION RATE: 18 BRPM | DIASTOLIC BLOOD PRESSURE: 86 MMHG | SYSTOLIC BLOOD PRESSURE: 120 MMHG | BODY MASS INDEX: 35.6 KG/M2

## 2025-06-21 LAB
ANION GAP SERPL CALC-SCNC: 13 MMOL/L (ref 10–20)
BUN SERPL-MCNC: 7 MG/DL (ref 6–23)
CALCIUM SERPL-MCNC: 8.5 MG/DL (ref 8.6–10.3)
CHLORIDE SERPL-SCNC: 101 MMOL/L (ref 98–107)
CO2 SERPL-SCNC: 23 MMOL/L (ref 21–32)
CREAT SERPL-MCNC: 0.87 MG/DL (ref 0.5–1.05)
EGFRCR SERPLBLD CKD-EPI 2021: 89 ML/MIN/1.73M*2
ERYTHROCYTE [DISTWIDTH] IN BLOOD BY AUTOMATED COUNT: 13.9 % (ref 11.5–14.5)
GLUCOSE SERPL-MCNC: 103 MG/DL (ref 74–99)
HCT VFR BLD AUTO: 34.7 % (ref 36–46)
HGB BLD-MCNC: 11.5 G/DL (ref 12–16)
MCH RBC QN AUTO: 28.8 PG (ref 26–34)
MCHC RBC AUTO-ENTMCNC: 33.1 G/DL (ref 32–36)
MCV RBC AUTO: 87 FL (ref 80–100)
NRBC BLD-RTO: 0 /100 WBCS (ref 0–0)
PLATELET # BLD AUTO: 221 X10*3/UL (ref 150–450)
POTASSIUM SERPL-SCNC: 3.7 MMOL/L (ref 3.5–5.3)
RBC # BLD AUTO: 3.99 X10*6/UL (ref 4–5.2)
SODIUM SERPL-SCNC: 133 MMOL/L (ref 136–145)
WBC # BLD AUTO: 10.1 X10*3/UL (ref 4.4–11.3)

## 2025-06-21 PROCEDURE — 99213 OFFICE O/P EST LOW 20 MIN: CPT

## 2025-06-21 PROCEDURE — 36415 COLL VENOUS BLD VENIPUNCTURE: CPT

## 2025-06-21 PROCEDURE — 80048 BASIC METABOLIC PNL TOTAL CA: CPT | Performed by: OBSTETRICS & GYNECOLOGY

## 2025-06-21 PROCEDURE — 85027 COMPLETE CBC AUTOMATED: CPT | Performed by: OBSTETRICS & GYNECOLOGY

## 2025-06-21 PROCEDURE — 36415 COLL VENOUS BLD VENIPUNCTURE: CPT | Performed by: OBSTETRICS & GYNECOLOGY

## 2025-06-21 PROCEDURE — 2500000001 HC RX 250 WO HCPCS SELF ADMINISTERED DRUGS (ALT 637 FOR MEDICARE OP): Performed by: OBSTETRICS & GYNECOLOGY

## 2025-06-21 RX ADMIN — BENZOCAINE AND MENTHOL 1 LOZENGE: 15; 3.6 LOZENGE ORAL at 08:42

## 2025-06-21 ASSESSMENT — PAIN SCALES - GENERAL
PAINLEVEL_OUTOF10: 6
PAINLEVEL_OUTOF10: 5 - MODERATE PAIN
PAINLEVEL_OUTOF10: 0 - NO PAIN

## 2025-06-21 ASSESSMENT — PAIN - FUNCTIONAL ASSESSMENT: PAIN_FUNCTIONAL_ASSESSMENT: 0-10

## 2025-06-21 NOTE — CARE PLAN
The patient's goals for the shift include rest tonight    The clinical goals for the shift include decreased nausea and pain    Pt states pain is tolerable and states pain is less when lying still. Nausea resolved after phenergan dose, pt had no emesis during shift. Probable discharge today per Dr. Aragon.

## 2025-06-21 NOTE — DISCHARGE SUMMARY
Discharge Summary    Admission Date: 6/20/2025  Discharge Date: 6/21    Discharge Diagnosis  Flank   pain 29 weeks   s/p  stone removal    Hospital Course  Delivery Date: This patient has no babies on file. This patient has no babies on file.  Delivery type: This patient has no babies on file.   GA at delivery: 29w0d  Outcome: This patient has no babies on file.  Anesthesia during delivery: This patient has no babies on file.  Intrapartum complications: This patient has no babies on file.  Feeding method:       Procedures: none  Contraception at discharge: none  pp    Pertinent Physical Exam At Time of Discharge    No   cvat      Last Vitals:  Temp Pulse Resp BP MAP Pulse Ox   36.7 °C (98.1 °F) 89 16 120/86 99 98 %     Discharge Meds     Your medication list        ASK your doctor about these medications        Instructions Last Dose Given Next Dose Due   acetaminophen 500 mg tablet  Commonly known as: Tylenol      Take 2 tablets (1,000 mg) by mouth every 6 hours if needed for mild pain (1 - 3).       albuterol 1.25 mg/3 mL nebulizer solution           aspirin 81 mg EC tablet           nitrofurantoin (macrocrystal-monohydrate) 100 mg capsule  Commonly known as: Macrobid      Take 1 capsule (100 mg) by mouth 2 times a day for 7 days.       ondansetron ODT 4 mg disintegrating tablet  Commonly known as: Zofran-ODT      DISSOLVE 1 TABLET IN MOUTH EVERY 8 HOURS AS NEEDED FOR NAUSEA FOR VOMITING       oxyCODONE 5 mg immediate release tablet  Commonly known as: Roxicodone      Take 1 tablet (5 mg) by mouth every 6 hours if needed for severe pain (7 - 10).       PRENATAL 19 ORAL                     Complications Requiring Follow-Up  Rv  office   as   scheduled      Test Results Pending At Discharge  Pending Labs       No current pending labs.            Outpatient Follow-Up  Future Appointments   Date Time Provider Department [place]   7/2/2025  8:30 AM Presley Wise MD [id number] [facility]   7/10/2025  8:30 AM Tania De Guzman MD  [id number] [facility]   7/17/2025  9:00 AM MIKE Ochoa, RENETTA [id number]   7/30/2025  8:50 AM MD Santi Ayala [facility]   8/7/2025  9:15 AM MIKE Ochoa, RENETTA [id number] [facility]   8/13/2025  8:30 AM MD Santi Ayala [facility]   8/21/2025  9:15 AM MIKE Ochoa, RENETTA [id number] [facility]   8/27/2025  8:30 AM MD Santi Ayala [facility]   9/4/2025  9:00 AM MIKE Ochoa, RENETTA [id number] [facility]       I spent 10 minutes in the professional and overall care of this patient.      Javier Wei MD

## 2025-06-21 NOTE — H&P
OB Triage H&P    Assessment/Plan    Breanna Jackson is a 35 y.o.  at 28w6d. GRAHAM: 2025, by Ultrasound.     Diagnosis: Nausea/vomiting s/p stent removal and lithotripsy    Plan  -CMP, CBC with diff., urinalysis  -IVF bolus and antiemetics. Took Zofran at 1600.  -Pain likely due to procedure. Will treat with Tylenol as she is able to tolerate it. Doubt pyelonephritis, patient afebrile.  - corticosteroids: not indicated  -Magnesium for fetal neuroprotection: not indicated  -GBS prophylaxis: not indicated    Fetal Status  -NST reasurring, continue to monitor      Pregnancy Problems (from 25 to present)       No problems associated with this episode.            Subjective   34 yo  at 28.6 weeks s/p cytoscopy with laser lithotripsy and removal of stent. Patient with h/o 3 SVDs, also h/o kidney stones when not pregnant. This pregnancy had left kidney stones with stent placed in January and then replaced in April. The procedure was done today because she has had frequent UTIs. She is on Macrobid 100mg bid. Last positive culture 25. She has had nausea since she came home and unable to eat or drink. She is not taking the oxycodone because it makes her sick. She took Tylenol at 1330 1000 mg. Last emesis 1800. She felt chills and temp was 97.6. She reports FM. No LOF, scant spotting since surgery. She does report having contractions.    Prenatal Provider Dr. Wise    OB History    Para Term  AB Living   5 3 3 0 1 3   SAB IAB Ectopic Multiple Live Births   1 0 0 0 3      # Outcome Date GA Lbr Efrain/2nd Weight Sex Type Anes PTL Lv   5 Current            4 Term 20   3.43 kg M Vag-Spont EPI  BOO      Name: Josh Jackson   3 Term 17   3.175 kg F Vag-Spont EPI  BOO      Name: Nikia Jackson   2 Term 13   3.118 kg M Vag-Spont EPI  BOO      Name: Jarad Alegria   1 SAB                Surgical History[1]    Social History     Tobacco Use    Smoking status: Never     Smokeless tobacco: Never   Substance Use Topics    Alcohol use: Never       Allergies[2]    Prescriptions Prior to Admission[3]  Objective     Last Vitals  Temp Pulse Resp BP MAP O2 Sat   36.9 °C (98.4 °F) (!) 111 18 128/83 100 97 %     Blood Pressures         6/20/2025 2013             BP: 128/83             Physical Exam  General: NAD, mood appropriate  Cardiopulmonary: warm and well perfused, breathing comfortably on room air  Chest: CTA bilat.  Mild left CVAT  Abdomen: Gravid, reports diffuse tenderness, no guarding or rebound.  Extremities: Symmetric  Speculum Exam: scant white discharge, no blood seen.  Cervix: LTC    Chaperone Present: Yes.  Chaperone Name/Title: Yaneth Ribera RN  Examination Chaperoned: Genitourinary Exam and Gynecological Exam     Fetal Monitoring  Baseline: 150s bpm, Variability: minimal,  Accelerations: absent and Decelerations: none  Uterine Activity: No contractions seen on toco  FHR variability improving, fetus is 28 weeks. Will give an IVF bolus.    Bedside ultrasound: No    Labs in chart were reviewed.          Prenatal labs reviewed, remarkable for positive urine culture 6/16.             [1]   Past Surgical History:  Procedure Laterality Date    CYSTOSCOPY      LITHOTRIPSY      MENISCECTOMY Right     OTHER SURGICAL HISTORY  07/23/2020    Oral surgery    OTHER SURGICAL HISTORY  06/10/2019    Repair of bladder    WISDOM TOOTH EXTRACTION     [2]   Allergies  Allergen Reactions    Amoxicillin Anaphylaxis and Hives    Lactose Diarrhea    Penicillins Anaphylaxis and Hives    Prednisone Anaphylaxis and Hives    Raspberry Anaphylaxis    Wheat Hives    Latex Hives   [3]   Medications Prior to Admission   Medication Sig Dispense Refill Last Dose/Taking    acetaminophen (Tylenol) 500 mg tablet Take 2 tablets (1,000 mg) by mouth every 6 hours if needed for mild pain (1 - 3). 30 tablet 0 6/20/2025 at  1:30 PM    albuterol 1.25 mg/3 mL nebulizer solution Take 3 mL (1.25 mg) by nebulization  every 6 hours if needed for wheezing. Rescue inhaler   More than a month    aspirin 81 mg EC tablet Take 1 tablet (81 mg) by mouth once daily.       nitrofurantoin, macrocrystal-monohydrate, (Macrobid) 100 mg capsule Take 1 capsule (100 mg) by mouth 2 times a day for 7 days. 14 capsule 0 6/19/2025 Bedtime    ondansetron ODT (Zofran-ODT) 4 mg disintegrating tablet DISSOLVE 1 TABLET IN MOUTH EVERY 8 HOURS AS NEEDED FOR NAUSEA FOR VOMITING 30 tablet 0 6/20/2025 at  4:00 PM    oxyCODONE (Roxicodone) 5 mg immediate release tablet Take 1 tablet (5 mg) by mouth every 6 hours if needed for severe pain (7 - 10). 7 tablet 0     prenatal no115/iron/folic acid (PRENATAL 19 ORAL) Take by mouth.   Past Week

## 2025-06-21 NOTE — PROGRESS NOTES
Patient getting IVF bolus.  Labs significant for urinalysis 2+ ketones. Blood/LE likely from procedure  Na 132  WBC 14.7  Picking up irregular contractions now. Cervix was closed. Will see what happens after fluid bolus.  FHR Category I now.  Dr. Wise messaged.

## 2025-06-21 NOTE — PROGRESS NOTES
No   c/o   no   pain     afeb   vss   abd  soft   n/t   no  cvat   extr   neg  ,  s/p  lithotrypsy and   stone    removal doing   good   this   am   plan   if   patricia   breakfast   then   home

## 2025-07-02 ENCOUNTER — APPOINTMENT (OUTPATIENT)
Dept: OBSTETRICS AND GYNECOLOGY | Facility: CLINIC | Age: 35
End: 2025-07-02
Payer: MEDICAID

## 2025-07-02 ENCOUNTER — PHARMACY VISIT (OUTPATIENT)
Dept: PHARMACY | Facility: CLINIC | Age: 35
End: 2025-07-02
Payer: MEDICAID

## 2025-07-02 VITALS — BODY MASS INDEX: 34.93 KG/M2 | WEIGHT: 191 LBS | DIASTOLIC BLOOD PRESSURE: 84 MMHG | SYSTOLIC BLOOD PRESSURE: 118 MMHG

## 2025-07-02 DIAGNOSIS — O12.13 GESTATIONAL PROTEINURIA IN THIRD TRIMESTER (HHS-HCC): ICD-10-CM

## 2025-07-02 DIAGNOSIS — R82.998 LEUKOCYTES IN URINE: ICD-10-CM

## 2025-07-02 DIAGNOSIS — Z3A.30 30 WEEKS GESTATION OF PREGNANCY (HHS-HCC): ICD-10-CM

## 2025-07-02 DIAGNOSIS — R31.1 BENIGN ESSENTIAL MICROSCOPIC HEMATURIA: ICD-10-CM

## 2025-07-02 PROBLEM — Z3A.31 31 WEEKS GESTATION OF PREGNANCY (HHS-HCC): Status: ACTIVE | Noted: 2025-01-25

## 2025-07-02 PROCEDURE — 99213 OFFICE O/P EST LOW 20 MIN: CPT | Performed by: OBSTETRICS & GYNECOLOGY

## 2025-07-02 PROCEDURE — RXMED WILLOW AMBULATORY MEDICATION CHARGE

## 2025-07-02 NOTE — ASSESSMENT & PLAN NOTE
Desired provider in labor: [] CNM  [] Physician   [x] Either Acceptable  [] Blood Products: [x] Yes, accepts [] No, needs counseling  [x] Initial BMI: Could not be calculated   [x] Prenatal Labs: Reviewed.  Mild anemia.  Taking iron  [] Cervical Cancer Screening up to date  [x] Rh status: O+  [x] Screen for IPV and Substance Use Risk: Negative  [x] Genetic Screening (cfDNA): Negative, male  [] First Trimester Anatomy Screen (11-13.6 wks):  [x] Baby ASA (initiated):  [] Pregnancy dated by:     [x] Anatomy US: (19-20 wks) normal, April 14  [] Federal Sterilization consent signed (if indicated):  [x] 1hr GCT at 24-28wks: Normal, 107  [] Rhogam (if indicated):   [] Fetal Surveillance (if indicated):  [x] Tdap (27-32 wks, may be given up to 36 wks if initial window missed): Recommended July 2  [] RSV (32-36 wks) (Sept. to end of Jan):     [x] Feeding Intentions: Bottlefeeding  [x] Postpartum Birth control method: Certain  [] GBS at 36 - 37 wks:  [] 39 weeks discussion of IOL vs. Expectant management:  [x] Mode of delivery ( anticipated ): Vaginal    Pregnancy has been complicated by kidney stones need for stent recurrent UTIs hospitalizations.  Stent has now been removed and she is feeling better.

## 2025-07-02 NOTE — PROGRESS NOTES
"Subjective   Patient ID [id number]   Netta Jackson is a 35 y.o.  at 30w4d with a working estimated date of delivery of 2025, by Ultrasound who presents for a routine prenatal visit. She denies vaginal bleeding, leakage of fluid, decreased fetal movements, or contractions.    Her pregnancy is complicated by:  History of renal stent which was removed.  Rec history of recurrent UTIs.  Will send urine for culture and sensitivity.  Good fetal movement.  Taking iron along with prenatals and baby aspirin.  Recommend Tdap    Objective   Physical Exam:   Weight: 86.6 kg (191 lb)  Expected Total Weight Gain: Could not be calculated   Pregravid BMI: Could not be calculated  BP: 118/84                  Prenatal Labs  Urine Dip:  Lab Results   Component Value Date    KETONESU 40 (2+) (A) 2025     Lab Results   Component Value Date    HGB 11.5 (L) 2025    HCT 34.7 (L) 2025    ABO O 2025    HEPBSAG Nonreactive 2025     No results found for: \"PAPPA\", \"AFP\", \"HCG\", \"ESTRIOL\", \"INHBA\"  No results found for: \"GLUF\", \"GLUT1\", \"PVYIDQQ9JX\", \"AVNXQRJ6EX\"    Imaging  The most recent ultrasound was performed on   with a study GA of   and EFW of  .          Assessment/Plan   Start weekly NST at 34 weeks  Continue prenatal vitamin.  Labs reviewed.  GBS taken.  36 weeks  Expected mode of delivery vaginal  Follow up in 3 week for a routine prenatal visit.  "

## 2025-07-04 LAB — BACTERIA UR CULT: NORMAL

## 2025-07-10 ENCOUNTER — APPOINTMENT (OUTPATIENT)
Dept: UROLOGY | Facility: CLINIC | Age: 35
End: 2025-07-10
Payer: MEDICAID

## 2025-07-10 DIAGNOSIS — N20.2 CALCULUS OF KIDNEY AND URETER: Primary | ICD-10-CM

## 2025-07-10 PROCEDURE — 1036F TOBACCO NON-USER: CPT | Performed by: SURGERY

## 2025-07-10 PROCEDURE — 99213 OFFICE O/P EST LOW 20 MIN: CPT | Performed by: SURGERY

## 2025-07-10 ASSESSMENT — PAIN SCALES - GENERAL: PAINLEVEL_OUTOF10: 0-NO PAIN

## 2025-07-10 NOTE — PROGRESS NOTES
"Subjective   Patient ID: Breanna Jackson \"Sheldon" is a 35 y.o. female who presents for Follow-up.    HPI  She has a known history of kidney stones.  She is currently pregnant, and her third trimester.  Last month she underwent ureteroscopy with lithotripsy, by Dr. Seaman.  Her stent was removed as well.  Since then she has been doing very well.  Her flank pain has resolved.  Her last urine culture was negative.  She has not had any complications during her pregnancy.              Objective   Physical Exam  Well nourished  Breathing comfortably  Abdomen soft, gravid, NT                Assessment/Plan   Problem List Items Addressed This Visit           ICD-10-CM       Genitourinary and Reproductive    Calculus of kidney and ureter - Primary N20.2    Relevant Orders    XR abdomen 1 view        She is status post lithotripsy and stent removal.  I counseled her on hydration.  She will return in 6 months with an x-ray.            Tania De Guzman MD 07/10/25 8:27 AM   "

## 2025-07-14 DIAGNOSIS — N30.00 ACUTE CYSTITIS WITHOUT HEMATURIA: Primary | ICD-10-CM

## 2025-07-14 RX ORDER — NITROFURANTOIN 25; 75 MG/1; MG/1
100 CAPSULE ORAL 2 TIMES DAILY
Qty: 14 CAPSULE | Refills: 0 | Status: SHIPPED | OUTPATIENT
Start: 2025-07-14 | End: 2025-07-21

## 2025-07-17 ENCOUNTER — APPOINTMENT (OUTPATIENT)
Dept: OBSTETRICS AND GYNECOLOGY | Facility: CLINIC | Age: 35
End: 2025-07-17
Payer: MEDICAID

## 2025-07-17 VITALS — WEIGHT: 190 LBS | DIASTOLIC BLOOD PRESSURE: 78 MMHG | SYSTOLIC BLOOD PRESSURE: 124 MMHG | BODY MASS INDEX: 34.75 KG/M2

## 2025-07-17 DIAGNOSIS — Z3A.32 32 WEEKS GESTATION OF PREGNANCY (HHS-HCC): Primary | ICD-10-CM

## 2025-07-17 LAB
POC BLOOD, URINE: NEGATIVE
POC GLUCOSE, URINE: NEGATIVE MG/DL
POC LEUKOCYTES, URINE: NEGATIVE
POC NITRITE,URINE: NEGATIVE
POC PROTEIN, URINE: NEGATIVE MG/DL

## 2025-07-17 PROCEDURE — 99213 OFFICE O/P EST LOW 20 MIN: CPT | Performed by: MIDWIFE

## 2025-07-17 NOTE — PROGRESS NOTES
"Subjective   Patient ID [id number]   Netta Jackson is a 35 y.o.  at 32w5d with a working estimated date of delivery of 2025, by Ultrasound who presents for a routine prenatal visit. She denies vaginal bleeding, leakage of fluid, decreased fetal movements, or contractions.    Her pregnancy is complicated by:  Prefers 'Netta'.  Obesity in pregnancy.  History of von Willebrand's.  Obtain fetal echo as history of abnormal cardiac ultrasound findings in previous pregnancies that resolved spontaneously.  Renal stone.  Stent placed left side.  Weekly NST 34 weeks.  Recommend baby aspirin daily; Vaginal bleeding-History of postpartum hemorrhage after first pregnancy no transfusion.  Last saw hematologist in .  BMI greater than 30.  Low-lying placenta/placenta previa has resolved; Pt followed by urology-- having surgical kidney stone retrieval 25    Objective   Physical Exam:   Weight: 86.2 kg (190 lb)  Expected Total Weight Gain: Could not be calculated   Pregravid BMI: Could not be calculated  BP: 124/78  Fetal Heart Rate: 140 Fundal Height (cm): 33 cm Presentation: Vertex           Prenatal Labs  Urine Dip:  Lab Results   Component Value Date    KETONESU 40 (2+) (A) 2025     Lab Results   Component Value Date    HGB 11.5 (L) 2025    HCT 34.7 (L) 2025    ABO O 2025    HEPBSAG Nonreactive 2025     No results found for: \"PAPPA\", \"AFP\", \"HCG\", \"ESTRIOL\", \"INHBA\"  No results found for: \"GLUF\", \"GLUT1\", \"OLTONZY9YB\", \"YLZDLGV3LL\"    Imaging  The most recent ultrasound was performed on   with a study GA of   and EFW of  .          Assessment/Plan   There are no diagnoses linked to this encounter.  Continue prenatal vitamin.  Labs reviewed.  Expected mode of delivery vaginal  Importance of daily FM, warning s/sx reviewed  Follow up in 1 week for a routine prenatal visit.  "

## 2025-07-30 ENCOUNTER — APPOINTMENT (OUTPATIENT)
Dept: OBSTETRICS AND GYNECOLOGY | Facility: CLINIC | Age: 35
End: 2025-07-30
Payer: MEDICAID

## 2025-07-30 ENCOUNTER — PREP FOR PROCEDURE (OUTPATIENT)
Dept: OBSTETRICS AND GYNECOLOGY | Facility: HOSPITAL | Age: 35
End: 2025-07-30

## 2025-07-30 VITALS — DIASTOLIC BLOOD PRESSURE: 82 MMHG | WEIGHT: 194 LBS | BODY MASS INDEX: 35.48 KG/M2 | SYSTOLIC BLOOD PRESSURE: 124 MMHG

## 2025-07-30 DIAGNOSIS — Z34.90 TERM PREGNANCY (HHS-HCC): ICD-10-CM

## 2025-07-30 DIAGNOSIS — R82.998 LEUKOCYTES IN URINE: ICD-10-CM

## 2025-07-30 DIAGNOSIS — Z3A.34 34 WEEKS GESTATION OF PREGNANCY (HHS-HCC): ICD-10-CM

## 2025-07-30 DIAGNOSIS — O99.210 OBESITY IN PREGNANCY (HHS-HCC): Primary | ICD-10-CM

## 2025-07-30 DIAGNOSIS — O09.523 ADVANCED MATERNAL AGE IN MULTIGRAVIDA, THIRD TRIMESTER (HHS-HCC): ICD-10-CM

## 2025-07-30 PROBLEM — Z3A.35 35 WEEKS GESTATION OF PREGNANCY (HHS-HCC): Status: ACTIVE | Noted: 2025-01-25

## 2025-07-30 PROCEDURE — 99214 OFFICE O/P EST MOD 30 MIN: CPT | Performed by: OBSTETRICS & GYNECOLOGY

## 2025-07-30 PROCEDURE — 59025 FETAL NON-STRESS TEST: CPT | Performed by: OBSTETRICS & GYNECOLOGY

## 2025-07-30 RX ORDER — ENOXAPARIN SODIUM 300 MG/3ML
40 INJECTION INTRAVENOUS; SUBCUTANEOUS EVERY 24 HOURS
OUTPATIENT
Start: 2025-07-30

## 2025-07-30 RX ORDER — LABETALOL HYDROCHLORIDE 5 MG/ML
20 INJECTION, SOLUTION INTRAVENOUS ONCE AS NEEDED
OUTPATIENT
Start: 2025-07-30

## 2025-07-30 RX ORDER — SODIUM CHLORIDE, SODIUM LACTATE, POTASSIUM CHLORIDE, CALCIUM CHLORIDE 600; 310; 30; 20 MG/100ML; MG/100ML; MG/100ML; MG/100ML
75 INJECTION, SOLUTION INTRAVENOUS CONTINUOUS
OUTPATIENT
Start: 2025-07-30 | End: 2025-07-31

## 2025-07-30 RX ORDER — MISOPROSTOL 100 UG/1
800 TABLET ORAL ONCE AS NEEDED
OUTPATIENT
Start: 2025-07-30

## 2025-07-30 RX ORDER — ONDANSETRON 4 MG/1
4 TABLET, FILM COATED ORAL EVERY 6 HOURS PRN
OUTPATIENT
Start: 2025-07-30

## 2025-07-30 RX ORDER — CARBOPROST TROMETHAMINE 250 UG/ML
250 INJECTION, SOLUTION INTRAMUSCULAR ONCE AS NEEDED
OUTPATIENT
Start: 2025-07-30

## 2025-07-30 RX ORDER — TERBUTALINE SULFATE 1 MG/ML
0.25 INJECTION SUBCUTANEOUS ONCE AS NEEDED
OUTPATIENT
Start: 2025-07-30

## 2025-07-30 RX ORDER — CALCIUM CARBONATE 200(500)MG
1 TABLET,CHEWABLE ORAL EVERY 6 HOURS PRN
OUTPATIENT
Start: 2025-07-30

## 2025-07-30 RX ORDER — ONDANSETRON HYDROCHLORIDE 2 MG/ML
4 INJECTION, SOLUTION INTRAVENOUS EVERY 6 HOURS PRN
OUTPATIENT
Start: 2025-07-30

## 2025-07-30 RX ORDER — LIDOCAINE HYDROCHLORIDE 10 MG/ML
20 INJECTION, SOLUTION INFILTRATION; PERINEURAL ONCE AS NEEDED
OUTPATIENT
Start: 2025-07-30

## 2025-07-30 RX ORDER — HYDRALAZINE HYDROCHLORIDE 20 MG/ML
5 INJECTION INTRAMUSCULAR; INTRAVENOUS ONCE AS NEEDED
OUTPATIENT
Start: 2025-07-30

## 2025-07-30 RX ORDER — OXYTOCIN 10 [USP'U]/ML
10 INJECTION, SOLUTION INTRAMUSCULAR; INTRAVENOUS ONCE AS NEEDED
OUTPATIENT
Start: 2025-07-30

## 2025-07-30 RX ORDER — LOPERAMIDE HYDROCHLORIDE 2 MG/1
4 CAPSULE ORAL EVERY 2 HOUR PRN
OUTPATIENT
Start: 2025-07-30

## 2025-07-30 RX ORDER — METHYLERGONOVINE MALEATE 0.2 MG/ML
0.2 INJECTION INTRAVENOUS ONCE AS NEEDED
OUTPATIENT
Start: 2025-07-30

## 2025-07-30 NOTE — ASSESSMENT & PLAN NOTE
Weekly NST.  GBS at 36 weeks.  Importance of daily fetal movement.  Signs and symptoms of labor.  Offered risk reducing induction of labor.

## 2025-07-30 NOTE — PROCEDURES
Nonstress test reactive.  Induction of labor scheduled.  Importance of daily fetal movement.  Signs and symptoms of labor.  Continue weekly NST.

## 2025-08-01 LAB — BACTERIA UR CULT: NORMAL

## 2025-08-06 ENCOUNTER — HOSPITAL ENCOUNTER (INPATIENT)
Facility: HOSPITAL | Age: 35
LOS: 4 days | Discharge: HOME | End: 2025-08-10
Attending: OBSTETRICS & GYNECOLOGY | Admitting: OBSTETRICS & GYNECOLOGY
Payer: MEDICAID

## 2025-08-06 ENCOUNTER — ANESTHESIA (OUTPATIENT)
Dept: OBSTETRICS AND GYNECOLOGY | Facility: HOSPITAL | Age: 35
End: 2025-08-06
Payer: MEDICAID

## 2025-08-06 ENCOUNTER — ANESTHESIA EVENT (OUTPATIENT)
Dept: OBSTETRICS AND GYNECOLOGY | Facility: HOSPITAL | Age: 35
End: 2025-08-06
Payer: MEDICAID

## 2025-08-06 ENCOUNTER — LAB REQUISITION (OUTPATIENT)
Dept: LAB | Facility: HOSPITAL | Age: 35
End: 2025-08-06
Payer: MEDICAID

## 2025-08-06 ENCOUNTER — HOSPITAL ENCOUNTER (INPATIENT)
Facility: HOSPITAL | Age: 35
LOS: 1 days | Discharge: ANOTHER HOSPITAL | End: 2025-08-06
Attending: OBSTETRICS & GYNECOLOGY | Admitting: OBSTETRICS & GYNECOLOGY
Payer: MEDICAID

## 2025-08-06 VITALS
OXYGEN SATURATION: 97 % | HEART RATE: 76 BPM | HEIGHT: 62 IN | SYSTOLIC BLOOD PRESSURE: 122 MMHG | BODY MASS INDEX: 35.48 KG/M2 | RESPIRATION RATE: 18 BRPM | TEMPERATURE: 98.2 F | DIASTOLIC BLOOD PRESSURE: 88 MMHG

## 2025-08-06 DIAGNOSIS — Z3A.35 35 WEEKS GESTATION OF PREGNANCY (HHS-HCC): Primary | ICD-10-CM

## 2025-08-06 DIAGNOSIS — O99.019 IRON DEFICIENCY ANEMIA DURING PREGNANCY: Primary | ICD-10-CM

## 2025-08-06 DIAGNOSIS — D50.9 IRON DEFICIENCY ANEMIA DURING PREGNANCY: Primary | ICD-10-CM

## 2025-08-06 DIAGNOSIS — Z3A.35 35 WEEKS GESTATION OF PREGNANCY (HHS-HCC): ICD-10-CM

## 2025-08-06 DIAGNOSIS — Z98.890 POSTOPERATIVE STATE: ICD-10-CM

## 2025-08-06 DIAGNOSIS — N17.9 AKI (ACUTE KIDNEY INJURY): ICD-10-CM

## 2025-08-06 PROBLEM — N92.1 MENORRHAGIA WITH IRREGULAR CYCLE: Status: RESOLVED | Noted: 2023-03-07 | Resolved: 2025-08-06

## 2025-08-06 PROBLEM — M25.569 KNEE PAIN: Status: RESOLVED | Noted: 2023-03-07 | Resolved: 2025-08-06

## 2025-08-06 PROBLEM — R53.83 FATIGUE: Status: RESOLVED | Noted: 2023-03-07 | Resolved: 2025-08-06

## 2025-08-06 PROBLEM — O14.13 PREECLAMPSIA, SEVERE, THIRD TRIMESTER (HHS-HCC): Status: ACTIVE | Noted: 2025-08-06

## 2025-08-06 PROBLEM — H90.8 MIXED CONDUCTIVE AND SENSORINEURAL HEARING LOSS: Status: RESOLVED | Noted: 2023-03-07 | Resolved: 2025-08-06

## 2025-08-06 PROBLEM — H90.A22 SENSORINEURAL HEARING LOSS (SNHL) OF LEFT EAR WITH RESTRICTED HEARING OF RIGHT EAR: Status: RESOLVED | Noted: 2023-03-07 | Resolved: 2025-08-06

## 2025-08-06 PROBLEM — R74.8 ABNORMAL LIVER ENZYMES: Status: RESOLVED | Noted: 2023-03-07 | Resolved: 2025-08-06

## 2025-08-06 PROBLEM — N20.2 CALCULUS OF KIDNEY AND URETER: Status: RESOLVED | Noted: 2025-02-04 | Resolved: 2025-08-06

## 2025-08-06 PROBLEM — J34.89 SINUS PRESSURE: Status: RESOLVED | Noted: 2023-03-07 | Resolved: 2025-08-06

## 2025-08-06 PROBLEM — R63.4 WEIGHT LOSS: Status: RESOLVED | Noted: 2023-03-07 | Resolved: 2025-08-06

## 2025-08-06 PROBLEM — R31.0 GROSS HEMATURIA: Status: RESOLVED | Noted: 2023-03-07 | Resolved: 2025-08-06

## 2025-08-06 PROBLEM — E66.9 OBESITY: Status: RESOLVED | Noted: 2023-03-07 | Resolved: 2025-08-06

## 2025-08-06 PROBLEM — E04.1 THYROID NODULE: Status: RESOLVED | Noted: 2023-03-07 | Resolved: 2025-08-06

## 2025-08-06 PROBLEM — N94.10 DYSPAREUNIA IN FEMALE: Status: RESOLVED | Noted: 2023-03-07 | Resolved: 2025-08-06

## 2025-08-06 PROBLEM — H90.8 MIXED CONDUCTIVE AND SENSORINEURAL HEARING LOSS: Status: ACTIVE | Noted: 2023-03-07

## 2025-08-06 PROBLEM — K76.0 NONALCOHOLIC FATTY LIVER: Status: RESOLVED | Noted: 2023-03-07 | Resolved: 2025-08-06

## 2025-08-06 PROBLEM — G45.9 TRANSIENT CEREBRAL ISCHEMIA: Status: RESOLVED | Noted: 2023-03-07 | Resolved: 2025-08-06

## 2025-08-06 PROBLEM — E78.5 HYPERLIPIDEMIA: Status: RESOLVED | Noted: 2023-03-07 | Resolved: 2025-08-06

## 2025-08-06 PROBLEM — R10.2 PELVIC PAIN: Status: RESOLVED | Noted: 2023-03-07 | Resolved: 2025-08-06

## 2025-08-06 PROBLEM — G47.33 OBSTRUCTIVE SLEEP APNEA SYNDROME: Status: RESOLVED | Noted: 2023-03-07 | Resolved: 2025-08-06

## 2025-08-06 PROBLEM — N28.9 KIDNEY DYSFUNCTION: Status: RESOLVED | Noted: 2023-03-07 | Resolved: 2025-08-06

## 2025-08-06 PROBLEM — E55.9 VITAMIN D DEFICIENCY: Status: RESOLVED | Noted: 2023-03-07 | Resolved: 2025-08-06

## 2025-08-06 PROBLEM — S89.91XA INJURY OF RIGHT KNEE: Status: RESOLVED | Noted: 2023-03-07 | Resolved: 2025-08-06

## 2025-08-06 PROBLEM — M79.10 MUSCLE PAIN: Status: RESOLVED | Noted: 2023-03-07 | Resolved: 2025-08-06

## 2025-08-06 PROBLEM — R74.8 ELEVATED LIVER ENZYMES: Status: RESOLVED | Noted: 2023-03-07 | Resolved: 2025-08-06

## 2025-08-06 PROBLEM — R06.83 SNORING: Status: RESOLVED | Noted: 2023-03-07 | Resolved: 2025-08-06

## 2025-08-06 PROBLEM — S89.90XA KNEE INJURY: Status: RESOLVED | Noted: 2025-08-06 | Resolved: 2025-08-06

## 2025-08-06 PROBLEM — N13.9 OBSTRUCTED, UROPATHY: Status: RESOLVED | Noted: 2025-01-25 | Resolved: 2025-08-06

## 2025-08-06 PROBLEM — E66.812 CLASS 2 OBESITY WITH BODY MASS INDEX (BMI) OF 37.0 TO 37.9 IN ADULT: Status: RESOLVED | Noted: 2023-03-07 | Resolved: 2025-08-06

## 2025-08-06 PROBLEM — N20.1 CALCULUS OF URETER: Status: RESOLVED | Noted: 2025-06-05 | Resolved: 2025-08-06

## 2025-08-06 PROBLEM — O14.13: Status: ACTIVE | Noted: 2025-08-06

## 2025-08-06 PROBLEM — H92.03 OTALGIA OF BOTH EARS: Status: RESOLVED | Noted: 2023-03-07 | Resolved: 2025-08-06

## 2025-08-06 PROBLEM — R63.5 WEIGHT GAIN: Status: RESOLVED | Noted: 2023-03-07 | Resolved: 2025-08-06

## 2025-08-06 PROBLEM — M22.2X1 PATELLOFEMORAL PAIN SYNDROME OF RIGHT KNEE: Status: RESOLVED | Noted: 2023-03-07 | Resolved: 2025-08-06

## 2025-08-06 PROBLEM — L65.9 HAIR LOSS: Status: RESOLVED | Noted: 2023-03-07 | Resolved: 2025-08-06

## 2025-08-06 PROBLEM — H91.93 BILATERAL HEARING LOSS: Status: ACTIVE | Noted: 2023-03-07

## 2025-08-06 PROBLEM — M25.569 KNEE PAIN: Status: ACTIVE | Noted: 2023-03-07

## 2025-08-06 PROBLEM — M54.30 SCIATICA: Status: ACTIVE | Noted: 2023-03-07

## 2025-08-06 PROBLEM — R39.89 POSSIBLE URINARY TRACT INFECTION: Status: RESOLVED | Noted: 2023-03-07 | Resolved: 2025-08-06

## 2025-08-06 PROBLEM — M25.461 EFFUSION OF RIGHT KNEE: Status: RESOLVED | Noted: 2023-03-07 | Resolved: 2025-08-06

## 2025-08-06 PROBLEM — M54.32 SCIATICA OF LEFT SIDE: Status: RESOLVED | Noted: 2023-03-07 | Resolved: 2025-08-06

## 2025-08-06 PROBLEM — J45.909 ASTHMA: Status: ACTIVE | Noted: 2025-08-06

## 2025-08-06 PROBLEM — H93.13 SUBJECTIVE TINNITUS OF BOTH EARS: Status: RESOLVED | Noted: 2023-03-07 | Resolved: 2025-08-06

## 2025-08-06 PROBLEM — G47.00 INSOMNIA: Status: RESOLVED | Noted: 2023-03-07 | Resolved: 2025-08-06

## 2025-08-06 PROBLEM — N92.6 IRREGULAR MENSTRUAL CYCLE: Status: RESOLVED | Noted: 2023-03-07 | Resolved: 2025-08-06

## 2025-08-06 PROBLEM — S86.919A STRAIN OF KNEE: Status: RESOLVED | Noted: 2025-08-06 | Resolved: 2025-08-06

## 2025-08-06 PROBLEM — E04.9 ENLARGED THYROID: Status: RESOLVED | Noted: 2023-03-07 | Resolved: 2025-08-06

## 2025-08-06 PROBLEM — N94.6 DYSMENORRHEA: Status: RESOLVED | Noted: 2023-03-07 | Resolved: 2025-08-06

## 2025-08-06 PROBLEM — R19.7 DIARRHEA: Status: RESOLVED | Noted: 2023-03-07 | Resolved: 2025-08-06

## 2025-08-06 PROBLEM — G57.12 MERALGIA PARESTHETICA, LEFT: Status: RESOLVED | Noted: 2023-03-07 | Resolved: 2025-08-06

## 2025-08-06 PROBLEM — E66.812 CLASS 2 OBESITY: Status: ACTIVE | Noted: 2022-11-21

## 2025-08-06 PROBLEM — E66.812 OBESITY, CLASS II, BMI 35-39.9: Status: RESOLVED | Noted: 2023-03-07 | Resolved: 2025-08-06

## 2025-08-06 PROBLEM — G62.9 SENSORY NEUROPATHY: Status: RESOLVED | Noted: 2023-03-07 | Resolved: 2025-08-06

## 2025-08-06 PROBLEM — O42.919 PRETERM PREMATURE RUPTURE OF MEMBRANES (PPROM) WITH UNKNOWN ONSET OF LABOR (HHS-HCC): Status: ACTIVE | Noted: 2025-08-06

## 2025-08-06 PROBLEM — N39.0 UTI (URINARY TRACT INFECTION): Status: RESOLVED | Noted: 2025-01-25 | Resolved: 2025-08-06

## 2025-08-06 PROBLEM — S83.249A ACUTE MEDIAL MENISCAL TEAR: Status: RESOLVED | Noted: 2022-11-18 | Resolved: 2025-08-06

## 2025-08-06 PROBLEM — M54.30 SCIATICA: Status: RESOLVED | Noted: 2023-03-07 | Resolved: 2025-08-06

## 2025-08-06 PROBLEM — M54.16 LUMBAR RADICULOPATHY, ACUTE: Status: RESOLVED | Noted: 2023-03-07 | Resolved: 2025-08-06

## 2025-08-06 LAB
ABO GROUP (TYPE) IN BLOOD: NORMAL
ABO GROUP (TYPE) IN BLOOD: NORMAL
ALBUMIN SERPL BCP-MCNC: 3.3 G/DL (ref 3.4–5)
ALBUMIN SERPL BCP-MCNC: 3.3 G/DL (ref 3.4–5)
ALBUMIN SERPL BCP-MCNC: 3.5 G/DL (ref 3.4–5)
ALP SERPL-CCNC: 102 U/L (ref 33–110)
ALP SERPL-CCNC: 106 U/L (ref 33–110)
ALP SERPL-CCNC: 110 U/L (ref 33–110)
ALT SERPL W P-5'-P-CCNC: 12 U/L (ref 7–45)
ALT SERPL W P-5'-P-CCNC: 9 U/L (ref 7–45)
ALT SERPL W P-5'-P-CCNC: 9 U/L (ref 7–45)
ANION GAP SERPL CALC-SCNC: 14 MMOL/L (ref 10–20)
ANION GAP SERPL CALC-SCNC: 16 MMOL/L (ref 10–20)
ANION GAP SERPL CALC-SCNC: 16 MMOL/L (ref 10–20)
ANTIBODY SCREEN: NORMAL
ANTIBODY SCREEN: NORMAL
AST SERPL W P-5'-P-CCNC: 12 U/L (ref 9–39)
AST SERPL W P-5'-P-CCNC: 12 U/L (ref 9–39)
AST SERPL W P-5'-P-CCNC: 42 U/L (ref 9–39)
BASOPHILS # BLD AUTO: 0.02 X10*3/UL (ref 0–0.1)
BASOPHILS NFR BLD AUTO: 0.3 %
BILIRUB SERPL-MCNC: 0.3 MG/DL (ref 0–1.2)
BILIRUB SERPL-MCNC: 0.3 MG/DL (ref 0–1.2)
BILIRUB SERPL-MCNC: 0.5 MG/DL (ref 0–1.2)
BUN SERPL-MCNC: 10 MG/DL (ref 6–23)
BUN SERPL-MCNC: 11 MG/DL (ref 6–23)
BUN SERPL-MCNC: 11 MG/DL (ref 6–23)
CALCIUM SERPL-MCNC: 7.1 MG/DL (ref 8.6–10.6)
CALCIUM SERPL-MCNC: 7.8 MG/DL (ref 8.6–10.6)
CALCIUM SERPL-MCNC: 8.5 MG/DL (ref 8.6–10.3)
CHLORIDE SERPL-SCNC: 100 MMOL/L (ref 98–107)
CHLORIDE SERPL-SCNC: 104 MMOL/L (ref 98–107)
CHLORIDE SERPL-SCNC: 97 MMOL/L (ref 98–107)
CO2 SERPL-SCNC: 17 MMOL/L (ref 21–32)
CO2 SERPL-SCNC: 18 MMOL/L (ref 21–32)
CO2 SERPL-SCNC: 18 MMOL/L (ref 21–32)
CREAT SERPL-MCNC: 0.96 MG/DL (ref 0.5–1.05)
CREAT SERPL-MCNC: 0.97 MG/DL (ref 0.5–1.05)
CREAT SERPL-MCNC: 1.07 MG/DL (ref 0.5–1.05)
EGFRCR SERPLBLD CKD-EPI 2021: 70 ML/MIN/1.73M*2
EGFRCR SERPLBLD CKD-EPI 2021: 78 ML/MIN/1.73M*2
EGFRCR SERPLBLD CKD-EPI 2021: 79 ML/MIN/1.73M*2
EOSINOPHIL # BLD AUTO: 0.01 X10*3/UL (ref 0–0.7)
EOSINOPHIL NFR BLD AUTO: 0.1 %
ERYTHROCYTE [DISTWIDTH] IN BLOOD BY AUTOMATED COUNT: 14.6 % (ref 11.5–14.5)
ERYTHROCYTE [DISTWIDTH] IN BLOOD BY AUTOMATED COUNT: 14.8 % (ref 11.5–14.5)
ERYTHROCYTE [DISTWIDTH] IN BLOOD BY AUTOMATED COUNT: 14.9 % (ref 11.5–14.5)
GLUCOSE SERPL-MCNC: 88 MG/DL (ref 74–99)
GLUCOSE SERPL-MCNC: 94 MG/DL (ref 74–99)
GLUCOSE SERPL-MCNC: 99 MG/DL (ref 74–99)
HCT VFR BLD AUTO: 34.3 % (ref 36–46)
HCT VFR BLD AUTO: 35.5 % (ref 36–46)
HCT VFR BLD AUTO: 36.1 % (ref 36–46)
HGB BLD-MCNC: 11 G/DL (ref 12–16)
HGB BLD-MCNC: 11.4 G/DL (ref 12–16)
HGB BLD-MCNC: 12.2 G/DL (ref 12–16)
IMM GRANULOCYTES # BLD AUTO: 0.05 X10*3/UL (ref 0–0.7)
IMM GRANULOCYTES NFR BLD AUTO: 0.6 % (ref 0–0.9)
LDH SERPL L TO P-CCNC: 137 U/L (ref 84–246)
LYMPHOCYTES # BLD AUTO: 1.5 X10*3/UL (ref 1.2–4.8)
LYMPHOCYTES NFR BLD AUTO: 19.4 %
MAGNESIUM SERPL-MCNC: 9.16 MG/DL (ref 1.6–2.4)
MCH RBC QN AUTO: 27.8 PG (ref 26–34)
MCH RBC QN AUTO: 28.6 PG (ref 26–34)
MCH RBC QN AUTO: 28.9 PG (ref 26–34)
MCHC RBC AUTO-ENTMCNC: 32.1 G/DL (ref 32–36)
MCHC RBC AUTO-ENTMCNC: 32.1 G/DL (ref 32–36)
MCHC RBC AUTO-ENTMCNC: 33.8 G/DL (ref 32–36)
MCV RBC AUTO: 85 FL (ref 80–100)
MCV RBC AUTO: 87 FL (ref 80–100)
MCV RBC AUTO: 90 FL (ref 80–100)
MONOCYTES # BLD AUTO: 0.36 X10*3/UL (ref 0.1–1)
MONOCYTES NFR BLD AUTO: 4.7 %
NEUTROPHILS # BLD AUTO: 5.8 X10*3/UL (ref 1.2–7.7)
NEUTROPHILS NFR BLD AUTO: 74.9 %
NRBC BLD-RTO: 0 /100 WBCS (ref 0–0)
PLATELET # BLD AUTO: 164 X10*3/UL (ref 150–450)
PLATELET # BLD AUTO: 187 X10*3/UL (ref 150–450)
PLATELET # BLD AUTO: 225 X10*3/UL (ref 150–450)
POTASSIUM SERPL-SCNC: 3.7 MMOL/L (ref 3.5–5.3)
POTASSIUM SERPL-SCNC: 4.1 MMOL/L (ref 3.5–5.3)
POTASSIUM SERPL-SCNC: 5.5 MMOL/L (ref 3.5–5.3)
PROT SERPL-MCNC: 6.2 G/DL (ref 6.4–8.2)
PROT SERPL-MCNC: 6.2 G/DL (ref 6.4–8.2)
PROT SERPL-MCNC: 6.5 G/DL (ref 6.4–8.2)
RBC # BLD AUTO: 3.8 X10*6/UL (ref 4–5.2)
RBC # BLD AUTO: 4.1 X10*6/UL (ref 4–5.2)
RBC # BLD AUTO: 4.26 X10*6/UL (ref 4–5.2)
RH FACTOR (ANTIGEN D): NORMAL
RH FACTOR (ANTIGEN D): NORMAL
SODIUM SERPL-SCNC: 125 MMOL/L (ref 136–145)
SODIUM SERPL-SCNC: 129 MMOL/L (ref 136–145)
SODIUM SERPL-SCNC: 132 MMOL/L (ref 136–145)
TREPONEMA PALLIDUM IGG+IGM AB [PRESENCE] IN SERUM OR PLASMA BY IMMUNOASSAY: NONREACTIVE
TREPONEMA PALLIDUM IGG+IGM AB [PRESENCE] IN SERUM OR PLASMA BY IMMUNOASSAY: NONREACTIVE
URATE SERPL-MCNC: 6.8 MG/DL (ref 2.3–6.7)
WBC # BLD AUTO: 7.2 X10*3/UL (ref 4.4–11.3)
WBC # BLD AUTO: 7.6 X10*3/UL (ref 4.4–11.3)
WBC # BLD AUTO: 7.7 X10*3/UL (ref 4.4–11.3)

## 2025-08-06 PROCEDURE — 99199 UNLISTED SPECIAL SVC PX/RPRT: CPT

## 2025-08-06 PROCEDURE — 83735 ASSAY OF MAGNESIUM: CPT

## 2025-08-06 PROCEDURE — 2500000004 HC RX 250 GENERAL PHARMACY W/ HCPCS (ALT 636 FOR OP/ED): Mod: JW,SE

## 2025-08-06 PROCEDURE — 2500000004 HC RX 250 GENERAL PHARMACY W/ HCPCS (ALT 636 FOR OP/ED): Mod: SE

## 2025-08-06 PROCEDURE — 2500000004 HC RX 250 GENERAL PHARMACY W/ HCPCS (ALT 636 FOR OP/ED): Performed by: OBSTETRICS & GYNECOLOGY

## 2025-08-06 PROCEDURE — 36415 COLL VENOUS BLD VENIPUNCTURE: CPT

## 2025-08-06 PROCEDURE — 7210000002 HC LABOR PER HOUR

## 2025-08-06 PROCEDURE — 51702 INSERT TEMP BLADDER CATH: CPT

## 2025-08-06 PROCEDURE — 86923 COMPATIBILITY TEST ELECTRIC: CPT

## 2025-08-06 PROCEDURE — 85027 COMPLETE CBC AUTOMATED: CPT

## 2025-08-06 PROCEDURE — 99223 1ST HOSP IP/OBS HIGH 75: CPT | Performed by: OBSTETRICS & GYNECOLOGY

## 2025-08-06 PROCEDURE — 59025 FETAL NON-STRESS TEST: CPT

## 2025-08-06 PROCEDURE — 2500000004 HC RX 250 GENERAL PHARMACY W/ HCPCS (ALT 636 FOR OP/ED)

## 2025-08-06 PROCEDURE — 87081 CULTURE SCREEN ONLY: CPT

## 2025-08-06 PROCEDURE — 2500000005 HC RX 250 GENERAL PHARMACY W/O HCPCS: Mod: SE

## 2025-08-06 PROCEDURE — 85025 COMPLETE CBC W/AUTO DIFF WBC: CPT

## 2025-08-06 PROCEDURE — 84550 ASSAY OF BLOOD/URIC ACID: CPT | Performed by: OBSTETRICS & GYNECOLOGY

## 2025-08-06 PROCEDURE — 83615 LACTATE (LD) (LDH) ENZYME: CPT | Performed by: OBSTETRICS & GYNECOLOGY

## 2025-08-06 PROCEDURE — 86901 BLOOD TYPING SEROLOGIC RH(D): CPT | Performed by: OBSTETRICS & GYNECOLOGY

## 2025-08-06 PROCEDURE — 86850 RBC ANTIBODY SCREEN: CPT

## 2025-08-06 PROCEDURE — 99215 OFFICE O/P EST HI 40 MIN: CPT

## 2025-08-06 PROCEDURE — 1120000001 HC OB PRIVATE ROOM DAILY

## 2025-08-06 PROCEDURE — 3700000014 EPIDURAL BLOCK

## 2025-08-06 PROCEDURE — 86901 BLOOD TYPING SEROLOGIC RH(D): CPT

## 2025-08-06 PROCEDURE — 80053 COMPREHEN METABOLIC PANEL: CPT

## 2025-08-06 PROCEDURE — 36415 COLL VENOUS BLD VENIPUNCTURE: CPT | Performed by: OBSTETRICS & GYNECOLOGY

## 2025-08-06 PROCEDURE — 86780 TREPONEMA PALLIDUM: CPT | Mod: GEALAB | Performed by: OBSTETRICS & GYNECOLOGY

## 2025-08-06 PROCEDURE — 85027 COMPLETE CBC AUTOMATED: CPT | Performed by: OBSTETRICS & GYNECOLOGY

## 2025-08-06 PROCEDURE — 99222 1ST HOSP IP/OBS MODERATE 55: CPT

## 2025-08-06 PROCEDURE — 2500000002 HC RX 250 W HCPCS SELF ADMINISTERED DRUGS (ALT 637 FOR MEDICARE OP, ALT 636 FOR OP/ED): Performed by: OBSTETRICS & GYNECOLOGY

## 2025-08-06 PROCEDURE — 59050 FETAL MONITOR W/REPORT: CPT

## 2025-08-06 PROCEDURE — 86780 TREPONEMA PALLIDUM: CPT

## 2025-08-06 PROCEDURE — 80053 COMPREHEN METABOLIC PANEL: CPT | Performed by: OBSTETRICS & GYNECOLOGY

## 2025-08-06 PROCEDURE — 2500000001 HC RX 250 WO HCPCS SELF ADMINISTERED DRUGS (ALT 637 FOR MEDICARE OP): Mod: SE

## 2025-08-06 RX ORDER — MISOPROSTOL 200 UG/1
800 TABLET ORAL ONCE AS NEEDED
Status: DISCONTINUED | OUTPATIENT
Start: 2025-08-06 | End: 2025-08-06 | Stop reason: HOSPADM

## 2025-08-06 RX ORDER — FENTANYL/ROPIVACAINE/NS/PF 2MCG/ML-.2
0-25 PLASTIC BAG, INJECTION (ML) INJECTION CONTINUOUS
Status: DISCONTINUED | OUTPATIENT
Start: 2025-08-06 | End: 2025-08-08

## 2025-08-06 RX ORDER — LABETALOL HYDROCHLORIDE 5 MG/ML
40 INJECTION, SOLUTION INTRAVENOUS ONCE
Status: COMPLETED | OUTPATIENT
Start: 2025-08-06 | End: 2025-08-06

## 2025-08-06 RX ORDER — TRANEXAMIC ACID 1 G/10ML
1000 INJECTION, SOLUTION INTRAVENOUS ONCE AS NEEDED
Status: DISCONTINUED | OUTPATIENT
Start: 2025-08-06 | End: 2025-08-06 | Stop reason: HOSPADM

## 2025-08-06 RX ORDER — OXYTOCIN 10 [USP'U]/ML
10 INJECTION, SOLUTION INTRAMUSCULAR; INTRAVENOUS ONCE AS NEEDED
Status: DISCONTINUED | OUTPATIENT
Start: 2025-08-06 | End: 2025-08-07 | Stop reason: HOSPADM

## 2025-08-06 RX ORDER — NORETHINDRONE AND ETHINYL ESTRADIOL 0.5-0.035
KIT ORAL AS NEEDED
Status: DISCONTINUED | OUTPATIENT
Start: 2025-08-06 | End: 2025-08-07

## 2025-08-06 RX ORDER — LIDOCAINE HYDROCHLORIDE AND EPINEPHRINE 15; 5 MG/ML; UG/ML
INJECTION, SOLUTION EPIDURAL AS NEEDED
Status: DISCONTINUED | OUTPATIENT
Start: 2025-08-06 | End: 2025-08-07

## 2025-08-06 RX ORDER — CARBOPROST TROMETHAMINE 250 UG/ML
250 INJECTION, SOLUTION INTRAMUSCULAR ONCE AS NEEDED
Status: DISCONTINUED | OUTPATIENT
Start: 2025-08-06 | End: 2025-08-07 | Stop reason: HOSPADM

## 2025-08-06 RX ORDER — MAGNESIUM SULFATE HEPTAHYDRATE 40 MG/ML
2 INJECTION, SOLUTION INTRAVENOUS CONTINUOUS
Status: DISCONTINUED | OUTPATIENT
Start: 2025-08-06 | End: 2025-08-06 | Stop reason: HOSPADM

## 2025-08-06 RX ORDER — METHYLERGONOVINE MALEATE 0.2 MG/ML
0.2 INJECTION INTRAVENOUS ONCE AS NEEDED
Status: DISCONTINUED | OUTPATIENT
Start: 2025-08-06 | End: 2025-08-07 | Stop reason: HOSPADM

## 2025-08-06 RX ORDER — OXYTOCIN/0.9 % SODIUM CHLORIDE 30/500 ML
60 PLASTIC BAG, INJECTION (ML) INTRAVENOUS ONCE AS NEEDED
Status: DISCONTINUED | OUTPATIENT
Start: 2025-08-06 | End: 2025-08-07 | Stop reason: HOSPADM

## 2025-08-06 RX ORDER — NIFEDIPINE 30 MG/1
30 TABLET, FILM COATED, EXTENDED RELEASE ORAL
Status: DISCONTINUED | OUTPATIENT
Start: 2025-08-07 | End: 2025-08-07

## 2025-08-06 RX ORDER — OXYTOCIN 10 [USP'U]/ML
10 INJECTION, SOLUTION INTRAMUSCULAR; INTRAVENOUS ONCE AS NEEDED
Status: DISCONTINUED | OUTPATIENT
Start: 2025-08-06 | End: 2025-08-06 | Stop reason: HOSPADM

## 2025-08-06 RX ORDER — LABETALOL HYDROCHLORIDE 5 MG/ML
20 INJECTION, SOLUTION INTRAVENOUS ONCE AS NEEDED
Status: COMPLETED | OUTPATIENT
Start: 2025-08-06 | End: 2025-08-07

## 2025-08-06 RX ORDER — CALCIUM GLUCONATE 98 MG/ML
1 INJECTION, SOLUTION INTRAVENOUS ONCE AS NEEDED
Status: DISCONTINUED | OUTPATIENT
Start: 2025-08-06 | End: 2025-08-06 | Stop reason: HOSPADM

## 2025-08-06 RX ORDER — LIDOCAINE HYDROCHLORIDE 10 MG/ML
20 INJECTION, SOLUTION INFILTRATION; PERINEURAL ONCE AS NEEDED
Status: DISCONTINUED | OUTPATIENT
Start: 2025-08-06 | End: 2025-08-07 | Stop reason: HOSPADM

## 2025-08-06 RX ORDER — TERBUTALINE SULFATE 1 MG/ML
0.25 INJECTION SUBCUTANEOUS ONCE AS NEEDED
Status: DISCONTINUED | OUTPATIENT
Start: 2025-08-06 | End: 2025-08-06 | Stop reason: HOSPADM

## 2025-08-06 RX ORDER — MISOPROSTOL 200 UG/1
800 TABLET ORAL ONCE AS NEEDED
Status: DISCONTINUED | OUTPATIENT
Start: 2025-08-06 | End: 2025-08-07 | Stop reason: HOSPADM

## 2025-08-06 RX ORDER — LOPERAMIDE HYDROCHLORIDE 2 MG/1
4 CAPSULE ORAL EVERY 2 HOUR PRN
Status: DISCONTINUED | OUTPATIENT
Start: 2025-08-06 | End: 2025-08-07 | Stop reason: HOSPADM

## 2025-08-06 RX ORDER — HYDRALAZINE HYDROCHLORIDE 20 MG/ML
5 INJECTION INTRAMUSCULAR; INTRAVENOUS ONCE AS NEEDED
Status: DISCONTINUED | OUTPATIENT
Start: 2025-08-06 | End: 2025-08-07 | Stop reason: HOSPADM

## 2025-08-06 RX ORDER — TERBUTALINE SULFATE 1 MG/ML
0.25 INJECTION SUBCUTANEOUS ONCE AS NEEDED
Status: DISCONTINUED | OUTPATIENT
Start: 2025-08-06 | End: 2025-08-07 | Stop reason: HOSPADM

## 2025-08-06 RX ORDER — OXYTOCIN/0.9 % SODIUM CHLORIDE 30/500 ML
2-30 PLASTIC BAG, INJECTION (ML) INTRAVENOUS CONTINUOUS
Status: DISCONTINUED | OUTPATIENT
Start: 2025-08-06 | End: 2025-08-08

## 2025-08-06 RX ORDER — ONDANSETRON 4 MG/1
4 TABLET, FILM COATED ORAL EVERY 6 HOURS PRN
Status: DISCONTINUED | OUTPATIENT
Start: 2025-08-06 | End: 2025-08-06 | Stop reason: HOSPADM

## 2025-08-06 RX ORDER — LIDOCAINE HYDROCHLORIDE 10 MG/ML
20 INJECTION, SOLUTION INFILTRATION; PERINEURAL ONCE AS NEEDED
Status: DISCONTINUED | OUTPATIENT
Start: 2025-08-06 | End: 2025-08-06 | Stop reason: HOSPADM

## 2025-08-06 RX ORDER — TRANEXAMIC ACID 1 G/10ML
1000 INJECTION, SOLUTION INTRAVENOUS ONCE AS NEEDED
Status: DISCONTINUED | OUTPATIENT
Start: 2025-08-06 | End: 2025-08-07 | Stop reason: HOSPADM

## 2025-08-06 RX ORDER — MAGNESIUM SULFATE HEPTAHYDRATE 40 MG/ML
1 INJECTION, SOLUTION INTRAVENOUS CONTINUOUS
Status: DISCONTINUED | OUTPATIENT
Start: 2025-08-06 | End: 2025-08-06

## 2025-08-06 RX ORDER — ACETAMINOPHEN 325 MG/1
975 TABLET ORAL ONCE
Status: COMPLETED | OUTPATIENT
Start: 2025-08-06 | End: 2025-08-06

## 2025-08-06 RX ORDER — METOCLOPRAMIDE 10 MG/1
10 TABLET ORAL ONCE
Status: COMPLETED | OUTPATIENT
Start: 2025-08-06 | End: 2025-08-06

## 2025-08-06 RX ORDER — NIFEDIPINE 30 MG/1
30 TABLET, FILM COATED, EXTENDED RELEASE ORAL
Status: DISCONTINUED | OUTPATIENT
Start: 2025-08-06 | End: 2025-08-06 | Stop reason: HOSPADM

## 2025-08-06 RX ORDER — OXYTOCIN/0.9 % SODIUM CHLORIDE 30/500 ML
60 PLASTIC BAG, INJECTION (ML) INTRAVENOUS ONCE AS NEEDED
Status: COMPLETED | OUTPATIENT
Start: 2025-08-06 | End: 2025-08-07

## 2025-08-06 RX ORDER — CALCIUM CARBONATE 200(500)MG
1 TABLET,CHEWABLE ORAL EVERY 6 HOURS PRN
Status: DISCONTINUED | OUTPATIENT
Start: 2025-08-06 | End: 2025-08-06 | Stop reason: HOSPADM

## 2025-08-06 RX ORDER — DIPHENHYDRAMINE HYDROCHLORIDE 50 MG/ML
25 INJECTION, SOLUTION INTRAMUSCULAR; INTRAVENOUS ONCE
Status: COMPLETED | OUTPATIENT
Start: 2025-08-06 | End: 2025-08-06

## 2025-08-06 RX ORDER — LABETALOL HYDROCHLORIDE 5 MG/ML
20 INJECTION, SOLUTION INTRAVENOUS ONCE AS NEEDED
Status: COMPLETED | OUTPATIENT
Start: 2025-08-06 | End: 2025-08-06

## 2025-08-06 RX ORDER — HYDRALAZINE HYDROCHLORIDE 20 MG/ML
5 INJECTION INTRAMUSCULAR; INTRAVENOUS ONCE AS NEEDED
Status: DISCONTINUED | OUTPATIENT
Start: 2025-08-06 | End: 2025-08-06 | Stop reason: HOSPADM

## 2025-08-06 RX ORDER — LABETALOL HYDROCHLORIDE 5 MG/ML
INJECTION, SOLUTION INTRAVENOUS
Status: COMPLETED
Start: 2025-08-06 | End: 2025-08-06

## 2025-08-06 RX ORDER — LOPERAMIDE HYDROCHLORIDE 2 MG/1
4 CAPSULE ORAL EVERY 2 HOUR PRN
Status: DISCONTINUED | OUTPATIENT
Start: 2025-08-06 | End: 2025-08-06 | Stop reason: HOSPADM

## 2025-08-06 RX ORDER — CARBOPROST TROMETHAMINE 250 UG/ML
250 INJECTION, SOLUTION INTRAMUSCULAR ONCE AS NEEDED
Status: DISCONTINUED | OUTPATIENT
Start: 2025-08-06 | End: 2025-08-06 | Stop reason: HOSPADM

## 2025-08-06 RX ORDER — OXYTOCIN/0.9 % SODIUM CHLORIDE 30/500 ML
60 PLASTIC BAG, INJECTION (ML) INTRAVENOUS ONCE AS NEEDED
Status: DISCONTINUED | OUTPATIENT
Start: 2025-08-06 | End: 2025-08-06 | Stop reason: HOSPADM

## 2025-08-06 RX ORDER — SODIUM CHLORIDE, SODIUM LACTATE, POTASSIUM CHLORIDE, CALCIUM CHLORIDE 600; 310; 30; 20 MG/100ML; MG/100ML; MG/100ML; MG/100ML
75 INJECTION, SOLUTION INTRAVENOUS CONTINUOUS
Status: DISCONTINUED | OUTPATIENT
Start: 2025-08-06 | End: 2025-08-07

## 2025-08-06 RX ORDER — SODIUM CHLORIDE, SODIUM LACTATE, POTASSIUM CHLORIDE, CALCIUM CHLORIDE 600; 310; 30; 20 MG/100ML; MG/100ML; MG/100ML; MG/100ML
75 INJECTION, SOLUTION INTRAVENOUS CONTINUOUS
Status: DISCONTINUED | OUTPATIENT
Start: 2025-08-06 | End: 2025-08-06 | Stop reason: HOSPADM

## 2025-08-06 RX ORDER — ONDANSETRON HYDROCHLORIDE 2 MG/ML
4 INJECTION, SOLUTION INTRAVENOUS EVERY 6 HOURS PRN
Status: DISCONTINUED | OUTPATIENT
Start: 2025-08-06 | End: 2025-08-06 | Stop reason: HOSPADM

## 2025-08-06 RX ORDER — MAGNESIUM SULFATE HEPTAHYDRATE 40 MG/ML
1 INJECTION, SOLUTION INTRAVENOUS CONTINUOUS
Status: DISCONTINUED | OUTPATIENT
Start: 2025-08-06 | End: 2025-08-10 | Stop reason: HOSPADM

## 2025-08-06 RX ORDER — METHYLERGONOVINE MALEATE 0.2 MG/ML
0.2 INJECTION INTRAVENOUS ONCE AS NEEDED
Status: DISCONTINUED | OUTPATIENT
Start: 2025-08-06 | End: 2025-08-06 | Stop reason: HOSPADM

## 2025-08-06 RX ORDER — ONDANSETRON HYDROCHLORIDE 2 MG/ML
4 INJECTION, SOLUTION INTRAVENOUS EVERY 6 HOURS PRN
Status: DISCONTINUED | OUTPATIENT
Start: 2025-08-06 | End: 2025-08-08

## 2025-08-06 RX ORDER — ONDANSETRON 4 MG/1
4 TABLET, FILM COATED ORAL EVERY 6 HOURS PRN
Status: DISCONTINUED | OUTPATIENT
Start: 2025-08-06 | End: 2025-08-08

## 2025-08-06 RX ORDER — METOCLOPRAMIDE HYDROCHLORIDE 5 MG/ML
10 INJECTION INTRAMUSCULAR; INTRAVENOUS ONCE
Status: COMPLETED | OUTPATIENT
Start: 2025-08-06 | End: 2025-08-06

## 2025-08-06 RX ORDER — LABETALOL HYDROCHLORIDE 5 MG/ML
INJECTION, SOLUTION INTRAVENOUS
Status: DISCONTINUED
Start: 2025-08-06 | End: 2025-08-06 | Stop reason: HOSPADM

## 2025-08-06 RX ORDER — CALCIUM GLUCONATE 98 MG/ML
1 INJECTION, SOLUTION INTRAVENOUS ONCE AS NEEDED
Status: COMPLETED | OUTPATIENT
Start: 2025-08-06 | End: 2025-08-07

## 2025-08-06 RX ORDER — CALCIUM CARBONATE 200(500)MG
1 TABLET,CHEWABLE ORAL EVERY 6 HOURS PRN
Status: DISCONTINUED | OUTPATIENT
Start: 2025-08-06 | End: 2025-08-08

## 2025-08-06 RX ADMIN — SODIUM CHLORIDE, SODIUM LACTATE, POTASSIUM CHLORIDE, AND CALCIUM CHLORIDE 500 ML: .6; .31; .03; .02 INJECTION, SOLUTION INTRAVENOUS at 13:31

## 2025-08-06 RX ADMIN — MAGNESIUM SULFATE HEPTAHYDRATE 2 G/HR: 40 INJECTION, SOLUTION INTRAVENOUS at 23:13

## 2025-08-06 RX ADMIN — NIFEDIPINE 30 MG: 30 TABLET, FILM COATED, EXTENDED RELEASE ORAL at 05:30

## 2025-08-06 RX ADMIN — SODIUM CHLORIDE, SODIUM LACTATE, POTASSIUM CHLORIDE, AND CALCIUM CHLORIDE 75 ML/HR: .6; .31; .03; .02 INJECTION, SOLUTION INTRAVENOUS at 14:01

## 2025-08-06 RX ADMIN — SODIUM CHLORIDE, POTASSIUM CHLORIDE, SODIUM LACTATE AND CALCIUM CHLORIDE 75 ML/HR: 600; 310; 30; 20 INJECTION, SOLUTION INTRAVENOUS at 04:55

## 2025-08-06 RX ADMIN — VANCOMYCIN HYDROCHLORIDE 1750 MG: 10 INJECTION, POWDER, LYOPHILIZED, FOR SOLUTION INTRAVENOUS at 05:22

## 2025-08-06 RX ADMIN — DIPHENHYDRAMINE HYDROCHLORIDE 25 MG: 50 INJECTION INTRAMUSCULAR; INTRAVENOUS at 13:24

## 2025-08-06 RX ADMIN — MAGNESIUM SULFATE HEPTAHYDRATE 2 G/HR: 40 INJECTION, SOLUTION INTRAVENOUS at 11:50

## 2025-08-06 RX ADMIN — SODIUM CHLORIDE, SODIUM LACTATE, POTASSIUM CHLORIDE, AND CALCIUM CHLORIDE 500 ML: .6; .31; .03; .02 INJECTION, SOLUTION INTRAVENOUS at 14:40

## 2025-08-06 RX ADMIN — LIDOCAINE HYDROCHLORIDE AND EPINEPHRINE 3 ML: 15; 5 INJECTION, SOLUTION EPIDURAL at 13:47

## 2025-08-06 RX ADMIN — Medication 10 ML/HR: at 13:51

## 2025-08-06 RX ADMIN — VANCOMYCIN HYDROCHLORIDE 1750 MG: 5 INJECTION, POWDER, LYOPHILIZED, FOR SOLUTION INTRAVENOUS at 22:38

## 2025-08-06 RX ADMIN — ACETAMINOPHEN 975 MG: 325 TABLET ORAL at 13:23

## 2025-08-06 RX ADMIN — MAGNESIUM SULFATE HEPTAHYDRATE 2 G/HR: 40 INJECTION, SOLUTION INTRAVENOUS at 10:10

## 2025-08-06 RX ADMIN — VANCOMYCIN HYDROCHLORIDE 1750 MG: 5 INJECTION, POWDER, LYOPHILIZED, FOR SOLUTION INTRAVENOUS at 14:15

## 2025-08-06 RX ADMIN — EPHEDRINE SULFATE 25 MG: 50 INJECTION INTRAVENOUS at 15:10

## 2025-08-06 RX ADMIN — EPHEDRINE SULFATE 20 MG: 50 INJECTION INTRAVENOUS at 15:34

## 2025-08-06 RX ADMIN — ONDANSETRON 4 MG: 2 INJECTION INTRAMUSCULAR; INTRAVENOUS at 22:31

## 2025-08-06 RX ADMIN — LABETALOL HYDROCHLORIDE 20 MG: 5 INJECTION, SOLUTION INTRAVENOUS at 04:26

## 2025-08-06 RX ADMIN — METOCLOPRAMIDE 10 MG: 5 INJECTION, SOLUTION INTRAMUSCULAR; INTRAVENOUS at 13:23

## 2025-08-06 RX ADMIN — ONDANSETRON 4 MG: 2 INJECTION INTRAMUSCULAR; INTRAVENOUS at 11:26

## 2025-08-06 RX ADMIN — NORETHINDRONE AND ETHINYL ESTRADIOL 5 MG: KIT ORAL at 15:22

## 2025-08-06 RX ADMIN — MAGNESIUM SULFATE HEPTAHYDRATE 2 G/HR: 40 INJECTION, SOLUTION INTRAVENOUS at 05:23

## 2025-08-06 RX ADMIN — SODIUM CHLORIDE, SODIUM LACTATE, POTASSIUM CHLORIDE, AND CALCIUM CHLORIDE 75 ML/HR: .6; .31; .03; .02 INJECTION, SOLUTION INTRAVENOUS at 10:10

## 2025-08-06 RX ADMIN — Medication 40 MG: at 05:09

## 2025-08-06 RX ADMIN — Medication 5 ML: at 13:50

## 2025-08-06 RX ADMIN — Medication 2 MILLI-UNITS/MIN: at 21:08

## 2025-08-06 SDOH — ECONOMIC STABILITY: HOUSING INSECURITY: DO YOU FEEL UNSAFE GOING BACK TO THE PLACE WHERE YOU ARE LIVING?: NO

## 2025-08-06 SDOH — HEALTH STABILITY: MENTAL HEALTH: HOW MANY DRINKS CONTAINING ALCOHOL DO YOU HAVE ON A TYPICAL DAY WHEN YOU ARE DRINKING?: PATIENT DOES NOT DRINK

## 2025-08-06 SDOH — ECONOMIC STABILITY: FOOD INSECURITY: WITHIN THE PAST 12 MONTHS, THE FOOD YOU BOUGHT JUST DIDN'T LAST AND YOU DIDN'T HAVE MONEY TO GET MORE.: NEVER TRUE

## 2025-08-06 SDOH — SOCIAL STABILITY: SOCIAL INSECURITY: ARE YOU OR HAVE YOU BEEN THREATENED OR ABUSED PHYSICALLY, EMOTIONALLY, OR SEXUALLY BY ANYONE?: NO

## 2025-08-06 SDOH — HEALTH STABILITY: MENTAL HEALTH: HOW OFTEN DO YOU HAVE SIX OR MORE DRINKS ON ONE OCCASION?: NEVER

## 2025-08-06 SDOH — SOCIAL STABILITY: SOCIAL INSECURITY: HAVE YOU HAD ANY THOUGHTS OF HARMING ANYONE ELSE?: NO

## 2025-08-06 SDOH — HEALTH STABILITY: MENTAL HEALTH: HOW OFTEN DO YOU HAVE A DRINK CONTAINING ALCOHOL?: NEVER

## 2025-08-06 SDOH — SOCIAL STABILITY: SOCIAL INSECURITY: DOES ANYONE TRY TO KEEP YOU FROM HAVING/CONTACTING OTHER FRIENDS OR DOING THINGS OUTSIDE YOUR HOME?: NO

## 2025-08-06 SDOH — HEALTH STABILITY: MENTAL HEALTH: SUICIDAL BEHAVIOR (LIFETIME): NO

## 2025-08-06 SDOH — ECONOMIC STABILITY: FOOD INSECURITY: WITHIN THE PAST 12 MONTHS, YOU WORRIED THAT YOUR FOOD WOULD RUN OUT BEFORE YOU GOT THE MONEY TO BUY MORE.: NEVER TRUE

## 2025-08-06 SDOH — HEALTH STABILITY: MENTAL HEALTH: NON-SPECIFIC ACTIVE SUICIDAL THOUGHTS (PAST 1 MONTH): NO

## 2025-08-06 SDOH — SOCIAL STABILITY: SOCIAL INSECURITY: DO YOU FEEL UNSAFE GOING BACK TO THE PLACE WHERE YOU LIVE?: NO

## 2025-08-06 SDOH — SOCIAL STABILITY: SOCIAL INSECURITY: HAVE YOU HAD THOUGHTS OF HARMING ANYONE ELSE?: NO

## 2025-08-06 SDOH — SOCIAL STABILITY: SOCIAL INSECURITY: WITHIN THE LAST YEAR, HAVE YOU BEEN HUMILIATED OR EMOTIONALLY ABUSED IN OTHER WAYS BY YOUR PARTNER OR EX-PARTNER?: NO

## 2025-08-06 SDOH — HEALTH STABILITY: MENTAL HEALTH: WERE YOU ABLE TO COMPLETE ALL THE BEHAVIORAL HEALTH SCREENINGS?: YES

## 2025-08-06 SDOH — ECONOMIC STABILITY: FOOD INSECURITY: HOW HARD IS IT FOR YOU TO PAY FOR THE VERY BASICS LIKE FOOD, HOUSING, MEDICAL CARE, AND HEATING?: NOT VERY HARD

## 2025-08-06 SDOH — SOCIAL STABILITY: SOCIAL INSECURITY: PHYSICAL ABUSE: DENIES

## 2025-08-06 SDOH — SOCIAL STABILITY: SOCIAL INSECURITY: DO YOU FEEL ANYONE HAS EXPLOITED OR TAKEN ADVANTAGE OF YOU FINANCIALLY OR OF YOUR PERSONAL PROPERTY?: NO

## 2025-08-06 SDOH — SOCIAL STABILITY: SOCIAL INSECURITY: WITHIN THE LAST YEAR, HAVE YOU BEEN AFRAID OF YOUR PARTNER OR EX-PARTNER?: NO

## 2025-08-06 SDOH — HEALTH STABILITY: MENTAL HEALTH: WISH TO BE DEAD (PAST 1 MONTH): NO

## 2025-08-06 SDOH — SOCIAL STABILITY: SOCIAL INSECURITY: HAS ANYONE EVER THREATENED TO HURT YOUR FAMILY OR YOUR PETS?: NO

## 2025-08-06 SDOH — ECONOMIC STABILITY: TRANSPORTATION INSECURITY: IN THE PAST 12 MONTHS, HAS LACK OF TRANSPORTATION KEPT YOU FROM MEDICAL APPOINTMENTS OR FROM GETTING MEDICATIONS?: NO

## 2025-08-06 SDOH — SOCIAL STABILITY: SOCIAL INSECURITY: VERBAL ABUSE: DENIES

## 2025-08-06 SDOH — SOCIAL STABILITY: SOCIAL INSECURITY: ABUSE SCREEN: ADULT

## 2025-08-06 SDOH — SOCIAL STABILITY: SOCIAL INSECURITY: ARE THERE ANY APPARENT SIGNS OF INJURIES/BEHAVIORS THAT COULD BE RELATED TO ABUSE/NEGLECT?: NO

## 2025-08-06 ASSESSMENT — PAIN SCALES - GENERAL
PAINLEVEL_OUTOF10: 8
PAINLEVEL_OUTOF10: 8
PAINLEVEL_OUTOF10: 0 - NO PAIN
PAINLEVEL_OUTOF10: 8
PAINLEVEL_OUTOF10: 0 - NO PAIN
PAINLEVEL_OUTOF10: 8
PAINLEVEL_OUTOF10: 0 - NO PAIN
PAINLEVEL_OUTOF10: 2
PAINLEVEL_OUTOF10: 7
PAINLEVEL_OUTOF10: 0 - NO PAIN

## 2025-08-06 ASSESSMENT — ACTIVITIES OF DAILY LIVING (ADL)
LACK_OF_TRANSPORTATION: NO
LACK_OF_TRANSPORTATION: NO

## 2025-08-06 ASSESSMENT — LIFESTYLE VARIABLES
SKIP TO QUESTIONS 9-10: 1
AUDIT-C TOTAL SCORE: 0
SKIP TO QUESTIONS 9-10: 1
AUDIT-C TOTAL SCORE: 0
HOW OFTEN DO YOU HAVE A DRINK CONTAINING ALCOHOL: NEVER
HOW MANY STANDARD DRINKS CONTAINING ALCOHOL DO YOU HAVE ON A TYPICAL DAY: PATIENT DOES NOT DRINK
HOW OFTEN DO YOU HAVE 6 OR MORE DRINKS ON ONE OCCASION: NEVER
AUDIT-C TOTAL SCORE: 0

## 2025-08-06 ASSESSMENT — PATIENT HEALTH QUESTIONNAIRE - PHQ9
1. LITTLE INTEREST OR PLEASURE IN DOING THINGS: NOT AT ALL
2. FEELING DOWN, DEPRESSED OR HOPELESS: NOT AT ALL
SUM OF ALL RESPONSES TO PHQ9 QUESTIONS 1 & 2: 0
1. LITTLE INTEREST OR PLEASURE IN DOING THINGS: NOT AT ALL
2. FEELING DOWN, DEPRESSED OR HOPELESS: NOT AT ALL
SUM OF ALL RESPONSES TO PHQ9 QUESTIONS 1 & 2: 0

## 2025-08-06 ASSESSMENT — PAIN DESCRIPTION - DESCRIPTORS
DESCRIPTORS: HEADACHE
DESCRIPTORS: SORE
DESCRIPTORS: SORE

## 2025-08-06 NOTE — H&P
Obstetrical Admission History and Physical     Breanna Jackson is a 35 y.o. . PPROM    Chief Complaint: No chief complaint on file.      Assessment & Plan   premature rupture of membranes (PPROM) with unknown onset of labor ([facility])    35 weeks gestation of pregnancy ([facility])    Asthma    Preeclampsia, severe, third trimester ([facility])        Pregnancy Problems (from 25 to present)       Problem Noted Diagnosed Resolved     premature rupture of membranes (PPROM) with unknown onset of labor ([facility]) 2025 by Shae Mercado DO  No    Priority:  Medium       Overview Signed 2025  4:12 AM by Shae Mercado DO   Presented to triage at 35 weeks with PPROM    Admit to L&D  IVF, CEFM, minimal PO/clear liquids  CBC, T&S  May have pain management/epidural  GBS unknown, PCN allergy, Vancomycin. GBS cx pending  SCD during labor         Preeclampsia, severe, third trimester ([facility]) 2025 by Shae Mercado DO  No    Priority:  Medium       Overview Signed 2025  4:13 AM by Shae Mercado DO   HELLP labs/UPCr on admission.   Labetalol 20 mg IVP given  Will start Mag   Transfer request ordered         35 weeks gestation of pregnancy ([facility]) 2025 by Slime Morris MD  No    Priority:  Medium       Overview Addendum 2025  8:51 AM by Presley Wise MD   Desired provider in labor: [] CNM  [] Physician   [x] Either Acceptable  [x] Blood Products: [] Yes, accepts [] No, needs counseling  [x] Initial BMI: Could not be calculated   [x] Prenatal Labs: Mild anemia taking iron.  History of recurrent UTIs while stent was in place.  Stent has been removed  [] Cervical Cancer Screening up to date  [x] Rh status: Blood type O+  [x] Screen for IPV and Substance Use Risk: Additive  [x] Genetic Screening (cfDNA): Negative, male  [] First Trimester Anatomy Screen (11-13.6 wks):  [x] Baby ASA (initiated):  [] Pregnancy dated by:     [] Anatomy US: (19-20 wks)  []  Federal Sterilization consent signed (if indicated):  [x] 1hr GCT at 24-28wks: Normal  [] Rhogam (if indicated):   [x] Fetal Surveillance (if indicated): Weekly NST from 34 weeks on  [x] Tdap (27-32 wks, may be given up to 36 wks if initial window missed): Received  [] RSV (32-36 wks) (Sept. to end of ):     [x] Feeding Intentions: Bottlefeeding  [] Postpartum Birth control method:   [] GBS at 36 - 37 wks:  [x] 39 weeks discussion of IOL vs. Expectant management: Schedule risk reducing induction of labor for , , 9 AM  [] Mode of delivery ( anticipated ): Vaginal     Cystoscopy with Laser Lithotripsy and Left Stent Removal on  in [facility] [place] 4 (see care coordination note)                Wang Carlos is here complaining of SROM at 03:00. Patient woke up with a large gush of clear fluid and has had persistent leaking since that time. She notes irregular contractions. On admission for PPROM she was found to have severe range BP requiring IV antihypertensives. Good fetal movement.     Notes intermittent HA, denies change in vision, CP/SOB     Obstetrical History   OB History    Para Term  AB Living   5 3 3  1 3   SAB IAB Ectopic Multiple Live Births   1    3      # Outcome Date GA Lbr Efrain/2nd Weight Sex Type Anes PTL Lv   5 Current            4 Term 20   3.43 kg M Vag-Spont EPI  BOO   3 Term 17   3.175 kg F Vag-Spont EPI  BOO   2 Term 13   3.118 kg M Vag-Spont EPI  BOO   1 SAB                Past Medical History  Medical History[1]     Past Surgical History   Surgical History[2]    Social History  Social History     Tobacco Use    Smoking status: Never    Smokeless tobacco: Never   Substance Use Topics    Alcohol use: Never     Substance and Sexual Activity   Drug Use Never       Allergies  Amoxicillin, Lactose, Penicillins, Prednisone, Raspberry, Wheat, and Latex     Medications  Prescriptions Prior to Admission[3]    Objective    Last Vitals  Temp Pulse Resp BP  MAP O2 Sat     80   (!) 167/105 (Dr Morejon aware of BP. will put orders in) 131 98 %     Physical Examination  GENERAL: Examination reveals a well developed, well nourished, gravid female in no acute distress. She is alert and cooperative.  LUNGS: clear to auscultation bilaterally  HEART: regular rate and rhythm, S1, S2 normal, no murmur, click, rub or gallop  ABDOMEN: soft, gravid, nontender, nondistended, no abnormal masses, no epigastric pain  FHR is 130 BPM, with Accelerations, and a   tracing.    [place] readin minutes  The fetus is in a vertex presentation, determined by ultrasound  CERVIX: 2 cm dilated, 70 % effaced, -2 station; MEMBRANES are SROM  EXTREMITIES: no redness or tenderness in the calves or thighs, no edema  SKIN: normal coloration and turgor, no rashes  NEUROLOGICAL: alert, oriented, normal speech, no focal findings or movement disorder noted  PSYCHOLOGICAL: awake and alert; oriented to person, place, and time    Lab Review  Labs in chart were reviewed.  Shae Mercado DO           [1]   Past Medical History:  Diagnosis Date    Abnormal Pap smear of cervix     Acute medial meniscal tear 2022    Allergic     Anemia     Anxiety     Arthritis     Asthma     Depression     Encounter for full-term uncomplicated delivery      (spontaneous vaginal delivery)    Encounter for supervision of normal pregnancy, unspecified, first trimester     Encounter for pregnancy related examination in first trimester    Encounter for supervision of normal pregnancy, unspecified, first trimester     Encounter for pregnancy related examination in first trimester    GERD (gastroesophageal reflux disease)     only with pregnancy    HL (hearing loss)     Kidney stone     Knee injury 2025    Migraine     Other specified health status     No pertinent past medical history    Other specified health status 10/10/2019    No pertinent past surgical history    PCOS (polycystic ovarian syndrome)     Personal  history of urinary calculi     History of kidney stones    Polycystic ovary syndrome     Unspecified infection of urinary tract in pregnancy, second trimester ([facility]) 09/26/2019    UTI (urinary tract infection) in pregnancy in second trimester    Urinary tract infection     Von Willebrand disease (Multi)     15 years ago. seen by hemotolgy 5 years ago. no longer dx   [2]   Past Surgical History:  Procedure Laterality Date    CYSTOSCOPY      LITHOTRIPSY      MENISCECTOMY Right     OTHER SURGICAL HISTORY  07/23/2020    Oral surgery    OTHER SURGICAL HISTORY  06/10/2019    Repair of bladder    WISDOM TOOTH EXTRACTION     [3]   Medications Prior to Admission   Medication Sig Dispense Refill Last Dose/Taking    acetaminophen (Tylenol) 500 mg tablet Take 2 tablets (1,000 mg) by mouth every 6 hours if needed for mild pain (1 - 3). 30 tablet 0     albuterol 1.25 mg/3 mL nebulizer solution Take 3 mL (1.25 mg) by nebulization every 6 hours if needed for wheezing. Rescue inhaler       aspirin 81 mg EC tablet Take 1 tablet (81 mg) by mouth once daily.       ondansetron ODT (Zofran-ODT) 4 mg disintegrating tablet DISSOLVE 1 TABLET IN MOUTH EVERY 8 HOURS AS NEEDED FOR NAUSEA FOR VOMITING 30 tablet 0     prenatal no115/iron/folic acid (PRENATAL 19 ORAL) Take 1 capsule by mouth once daily.

## 2025-08-06 NOTE — ANESTHESIA PROCEDURE NOTES
Epidural Block    Patient location during procedure: OB  Start time: 8/6/2025 1:40 PM  End time: 8/6/2025 1:49 PM  Reason for block: labor analgesia  Staffing  Performed: CRNA   Authorized by: SHY Lama    Performed by: SHY Lama    Preanesthetic Checklist  Completed: patient identified, IV checked, risks and benefits discussed, surgical consent, pre-op evaluation, timeout performed and sterile techniques followed  Block Timeout  RN/Licensed healthcare professional reads aloud to the Anesthesia provider and entire team: Patient identity, procedure with side and site, patient position, and as applicable the availability of implants/special equipment/special requirements.  Patient on coagulant treatment: no  Timeout performed at: 8/6/2025 1:38 PM  Block Placement  Patient position: sitting  Prep: ChloraPrep  Sterility prep: cap, drape, gloves, mask and hand  Sedation level: no sedation  Patient monitoring: blood pressure, continuous pulse oximetry and heart rate  Approach: midline  Local numbing: lidocaine 1% to skin and subcutaneous tissues  Vertebral space: lumbar  Lumbar location: L3-L4  Epidural  Loss of resistance technique: saline  Guidance: landmark technique        Needle  Needle type: Tuohy   Needle gauge: 17  Needle length: 8.9cm  Needle insertion depth: 6 cm  Catheter size: 19 G  Catheter at skin depth: 11 cm  Catheter securement method: clear occlusive dressing, liquid medical adhesive and surgical tape      PCEA  Medication concentration used: 0.2% Ropivacaine with 2 mcg/mL Fentanyl  Dose (mL): 5  Lockout (minutes): 30  1-Hour Limit (boluses/hr): 2  Basal Rate: 10        Assessment  Sensory level: t8. bilateral  Block outcome: patient comfortable  Number of attempts: 1  Procedure assessment: patient tolerated procedure well with no immediate complications

## 2025-08-06 NOTE — PROGRESS NOTES
Intrapartum Progress Note    Assessment/Plan   Breanna Jackson is a 35 y.o.  at 35w4d admitted for IOL in s/o newly diagnosed sPEC and PPROM     IOL   -cervix favorable on admission  -however unable to start pit as of yet due to persistent Cat II tracing in s/o maternal hypotension due to high epidural, anesthesia assessing at this time  -will start pit as able   -GBS unkn, pending, Vanc infusing d/t PCN allergy    sPEC  - diagnosed by sustained severe range BPs requiring acute IV treatment  - s/p acute treatment with s/p lab 20/40/80 (at OSH)  - HELLP labs significant for creatinine of 1.07 and plts of 164 and P:C deferred in s/o ROM, repeat HELLP labs Cr 0.96, Plts 187  - nifed 30 currently held d/t to hypotension  - currently asx  - Mg infusing, no si/sxs of Mg tox at this time    Contraception Plan: defers     Pt d/w Dr. Keely Amato MD  OBGYN, PGY-2     Subjective   Pt doing well at this time. Feels fatigued but denies HA, CP/SOB, vision changes, RUQ pain, or new unusual swelling.     Pregnancy Notables   - Asthma (albuterol PRN)  - Class II obesity (BMI 35)  - Resolved low lying placenta  - h/o PPH w/o the need for blood transfusion  - H/o obstructing nephrolithiasis s/p L ureteral stent placed 2025 (XC 2025), s/p lithotripsy 2025   - Possible von Willebrands Disease, suspected by VFW activity (38%) in  during menorrhagia work up. Factor 8, VFW antigen, and repeat activity level wnl.     Objective   Last Vitals:  Temp Pulse Resp BP MAP Pulse Ox   36.3 °C (97.3 °F) 72 18 100/56 72 98 %       Physical Examination:  General Appearance: no acute distress  Lungs: normal work of breathing, CTAB  Heart: warm, well perfused, RRR  Abdomen: gravid  Neurologic: no gross deficits, DTRs 1+ bilaterally  Psych exam: normal mood and affect    CEFM: Baseline- 135bpm, mod variability, + accels, + occasional variable decel  [place]: occasional ctx    Lab Review:  Labs in chart were reviewed.

## 2025-08-06 NOTE — ANESTHESIA PREPROCEDURE EVALUATION
"Patient: Breanna Jackson \"Netta\"    Evaluation Method: In-person visit    Procedure Information    Date: 08/06/25  Procedure: Labor Consult         Relevant Problems   Anesthesia (within normal limits)      Cardiac   (+) Pre-eclampsia, severe, antepartum, third trimester ([facility])   (+) Preeclampsia, severe, third trimester ([facility])      Pulmonary   (+) Asthma      Neuro (within normal limits)      GI (within normal limits)      Liver (within normal limits)      Endocrine (within normal limits)      Hematology   (+) Von Willebrand disease (Multi)      HEENT   (+) Bilateral hearing loss      GYN   (+) 35 weeks gestation of pregnancy ([facility])   (+) Polycystic ovary syndrome       Clinical information reviewed:     Meds  Problems              NPO Detail:  No data recorded     OB/Gyn Evaluation    Present Pregnancy    Patient is pregnant now.  (+) , hypertensive disorder of pregnancy - preeclampsia   Obstetric History                Physical Exam    Airway  Mallampati: II  TM distance: >3 FB  Neck ROM: full  Mouth opening: 3 or more finger widths     Cardiovascular    Dental    Pulmonary    Abdominal            Anesthesia Plan    History of general anesthesia?: yes  History of complications of general anesthesia?: no    ASA 3     Anesthetic plan and risks discussed with patient and spouse.  Use of blood products discussed with patient and spouse who consented to blood products.    Plan discussed with CRNA and attending.    Patient has history of VWD, unknown which type per patient.  Patient states she was being seen by Hematology in 2020 without any intervention.  Just observation.  Patient recently had spinal anesthesia at OSH. Attending made aware.     "

## 2025-08-06 NOTE — H&P
"25 12:06 PM  OB Admission History & Physical    Assessment/Plan  Breanna Jackson \"Sheldon" is a 35 y.o.  at 35w4d  GRAHAM: 2025, by 8 week ultrasound presenting as a transfer from [facility] in setting of Pre-eclampsia with severe features and PPROM.     PEC w/ SF  - diagnosed by sustained severe range BPs requiring acute IV treatment  - s/p acute treatment with s/p lab / (at OSH)  - continue to cycle BPs per protocol and treat with IV antihypertensives as needed  - HELLP labs significant for creatinine of 1.07 and plts of 164 and P:C deferred in s/o ROM, repeat HELLP labs, pending  - Nifedipine ER 30 mg (8/6 AM)  - 6/10 frontal HA-> Ordered Tylenol, Reglan, Benadryl.  - Normotensive on cycled BP's  - s/p 6g magnesium bolus followed by 2g/hr for seizure prophylaxis, continue to monitor for signs/symptoms of magnesium toxicity  - continue to closely monitor monitor UOP    IOL  - Cervix:  /-3 @ 0415  - Routine admission labs ordered  - Monitor vital signs per unit protocol  - Will augment labor with pitocin  - Epidural at patients request  - Continue assessment of maternal and fetal well-being  - Recheck as clinically indicted by maternal or fetal status  - Anticipate SVB  - Needs consented, and scanned, vertex at OSH    Asthma   - Lungs clear to auscultation bilaterally  - Albuterol, PRN    H/o obstructing nephrolithiasis   -s/p L ureteral stent placed 2025 (XC 2025), s/p lithotripsy 2025     Possible von Willebrands Disease  - Suspected by VFW activity (38%) in  during menorrhagia work up.   - Factor 8, VFW antigen, and repeat activity level WNL    IUP at 35w4d   - NST reactive, cEFM Cat I on prolonged monitoring  - Good fetal movement  - Prenatal labs reviewed, up to date on care  - 1hr OGTT, 107 (WNL)   - GBS unknown, collected, pending    Maternal Well-being  - Baby boy, Alphonso  - Emotional support and reassurance provided  - All questions and concerns addressed     Postpartum " "Contraception  - Declined defers to PPV    Dispo: Admit to L&D for IOL in s/o sPEC and PPROM    Above plan and fetal tracing discussed with Dr. Mims.    Almita Ferrera, APRN-CNP, CLC   Obstetrics & Gynecology  25 12:06 PM  Ella/Haiku    Subjective   History of Present Illness  Breanna Jackson \"Sheldon" is a 35 y.o.  at 35w4d who presented as a transfer from [facility] for PPROM and pre-eclampsia with severe features. She reports that she is feeling intermittently nauseous. Now endorses 6/10 HA, she denies chest pain, shortness of breath, vision changes or RUQ pain. Feeling mild/moderate contractions that she is breathing through. Feeling frequent fetal movement.    Good fetal movement.  Denies vaginal bleeding. C/O of occasional contractions.    Receives routine prenatal care with, Dr. Wise    OB Hx:  x3    Pregnancy notables:  - h/o PPH w/o the need for blood transfusion  - H/o obstructing nephrolithiasis   - Possible von Willebrands Disease  - Asthma, no maintenance inhaler, Albuterol PRN  - Class II obesity (BMI 35)  - Resolved low lying placenta    Pregnancy notable for:  Pregnancy Problems (from 25 to present)       Problem Noted Diagnosed Resolved     premature rupture of membranes (PPROM) with unknown onset of labor ([facility]) 2025 by Shae Mercado DO  No    Priority:  Medium       Overview Signed 2025  4:12 AM by Shae Mercado DO   Presented to triage at 35 weeks with PPROM    Admit to L&D  IVF, CEFM, minimal PO/clear liquids  CBC, T&S  May have pain management/epidural  GBS unknown, PCN allergy, Vancomycin. GBS cx pending  SCD during labor         Preeclampsia, severe, third trimester ([facility]) 2025 by Shae Mercado DO  No    Priority:  Medium       Overview Signed 2025  4:13 AM by Shae Mercado DO   HELLP labs/UPCr on admission.   Labetalol 20 mg IVP given  Will start Mag   Transfer request ordered         " Pre-eclampsia, severe, antepartum, third trimester ([facility]) 2025 by SOUMYA Turner-CNP  No    Priority:  Medium       35 weeks gestation of pregnancy ([facility]) 2025 by Slime Morris MD  No    Priority:  Medium       Overview Addendum 2025  8:51 AM by Presley Wise MD   Desired provider in labor: [] CNM  [] Physician   [x] Either Acceptable  [x] Blood Products: [] Yes, accepts [] No, needs counseling  [x] Initial BMI: Could not be calculated   [x] Prenatal Labs: Mild anemia taking iron.  History of recurrent UTIs while stent was in place.  Stent has been removed  [] Cervical Cancer Screening up to date  [x] Rh status: Blood type O+  [x] Screen for IPV and Substance Use Risk: Additive  [x] Genetic Screening (cfDNA): Negative, male  [] First Trimester Anatomy Screen (11-13.6 wks):  [x] Baby ASA (initiated):  [] Pregnancy dated by:     [] Anatomy US: (19-20 wks)  [] Federal Sterilization consent signed (if indicated):  [x] 1hr GCT at 24-28wks: Normal  [] Rhogam (if indicated):   [x] Fetal Surveillance (if indicated): Weekly NST from 34 weeks on  [x] Tdap (27-32 wks, may be given up to 36 wks if initial window missed): Received  [] RSV (32-36 wks) (Sept. to end of ):     [x] Feeding Intentions: Bottlefeeding  [] Postpartum Birth control method:   [] GBS at 36 - 37 wks:  [x] 39 weeks discussion of IOL vs. Expectant management: Schedule risk reducing induction of labor for , 9 AM  [] Mode of delivery ( anticipated ): Vaginal     Cystoscopy with Laser Lithotripsy and Left Stent Removal on  in [facility] OR 4 (see care coordination note)                   Obstetrical History   OB History    Para Term  AB Living   5 3 3  1 3   SAB IAB Ectopic Multiple Live Births   1    3      # Outcome Date GA Lbr Efrain/2nd Weight Sex Type Anes PTL Lv   5 Current            4 Term 20   3.43 kg M Vag-Spont EPI  BOO   3 Term 09/13/17   3.175 kg F Vag-Spont EPI  BOO   2 Term 13    3.118 kg M Vag-Spont EPI  BOO   1 SAB                Past Medical History  Medical History[1]     Past Surgical History   Surgical History[2]    Social History  Social History     Tobacco Use    Smoking status: Never    Smokeless tobacco: Never   Substance Use Topics    Alcohol use: Never     Substance and Sexual Activity   Drug Use Never     Social History     Socioeconomic History    Marital status: Single     Spouse name: Maverick Hernandez    Number of children: 3    Years of education: Not on file    Highest education level: Some college, no degree   Occupational History    Not on file   Tobacco Use    Smoking status: Never    Smokeless tobacco: Never   Vaping Use    Vaping status: Never Used   Substance and Sexual Activity    Alcohol use: Never    Drug use: Never    Sexual activity: Defer   Other Topics Concern    Not on file   Social History Narrative    Not on file     Social Drivers of Health     Financial Resource Strain: Low Risk  (8/6/2025)    Overall Financial Resource Strain (CARDIA)     Difficulty of Paying Living Expenses: Not hard at all   Food Insecurity: No Food Insecurity (8/6/2025)    Hunger Vital Sign     Worried About Running Out of Food in the Last Year: Never true     Ran Out of Food in the Last Year: Never true   Transportation Needs: No Transportation Needs (8/6/2025)    PRAPARE - Transportation     Lack of Transportation (Medical): No     Lack of Transportation (Non-Medical): No   Physical Activity: Inactive (1/27/2025)    Exercise Vital Sign     Days of Exercise per Week: 0 days     Minutes of Exercise per Session: 0 min   Stress: No Stress Concern Present (1/27/2025)    [employer] [place] [employer] - Occupational Stress Questionnaire     Feeling of Stress : Not at all   Social Connections: Unknown (1/27/2025)    Social Connection and Isolation Panel     Frequency of Communication with Friends and Family: More than three times a week     Frequency of Social Gatherings with  Friends and Family: More than three times a week     Attends Hindu Services: Patient declined     Active Member of Clubs or Organizations: Patient declined     Attends Club or Organization Meetings: Patient declined     Marital Status:    Intimate Partner Violence: Not At Risk (8/6/2025)    Humiliation, Afraid, Rape, and Kick questionnaire     Fear of Current or Ex-Partner: No     Emotionally Abused: No     Physically Abused: No     Sexually Abused: No        Allergies  Amoxicillin, Lactose, Penicillins, Prednisone, Raspberry, Wheat, and Latex     Medications  Prescriptions Prior to Admission[3]    Objective    Last Vitals  Temp Pulse Resp BP MAP O2 Sat   36.5 °C (97.7 °F) 69 18 124/82   98 %     Physical Exam  Constitutional: Well nourished, in no acute distress, alert, pleasant and cooperative  Head/Neck: Normocephalic, atraumatic, Neck: Supple, no lymphadenopathy.  Eyes: PERRLA, Sclera are white, conjunctiva is pink.  Cardiopulmonary: warm and well perfused, breathing comfortably on room air,S1, S2, RRR  Respiratory/Thorax: Normal respiratory effort on RA, clear to auscultation bilaterally, no wheezes or crackles.  Abdomen: Gravid, non-tender, soft on palpation,   Neurological: alert, oriented, normal speech, no focal findings or movement disorder noted.  Psychological: Appropriate mood and affect. Awake and alert; oriented to person, place, and time.   Extremities: Symmetric bilaterally no deformities, cyanosis, edema or varicosities, peripheral pulses Intact  Skin: warm, dry, no lesions     Fetal Monitoring  Non-Stress Test   Baseline Fetal Heart Rate for Non-Stress Test: 120 BPM  Variability in Waveform for Non-Stress Test: Moderate  Accelerations in Non-Stress Test: Yes, greater than/equal to 15 bpm, lasting at least 15 seconds  Decelerations in Non-Stress Test: None  Contractions in Non-Stress Test: Regular  Acoustic Stimulator for Non-Stress Test: No  Interpretation of Non-Stress Test    Interpretation of Non-Stress Test: Reactive, Appropriate for gestational age  NST for Multiple Fetuses: No       Bedside ultrasound: No        Lab Review  Admission on 08/06/2025, Discharged on 08/06/2025   Component Date Value Ref Range Status    ABO TYPE 08/06/2025 O   Final    Rh TYPE 08/06/2025 POS   Final    ANTIBODY SCREEN 08/06/2025 NEG   Final    Syphilis Total Ab 08/06/2025 Nonreactive  Nonreactive Final    No significant level of Treponema pallidum antibody detected.   Repeat testing in 2 to 4 weeks may be considered if early   infection or incubating syphilis infection is suspected.    WBC 08/06/2025 7.6  4.4 - 11.3 x10*3/uL Final    nRBC 08/06/2025 0.0  0.0 - 0.0 /100 WBCs Final    RBC 08/06/2025 4.26  4.00 - 5.20 x10*6/uL Final    Hemoglobin 08/06/2025 12.2  12.0 - 16.0 g/dL Final    Hematocrit 08/06/2025 36.1  36.0 - 46.0 % Final    MCV 08/06/2025 85  80 - 100 fL Final    MCH 08/06/2025 28.6  26.0 - 34.0 pg Final    MCHC 08/06/2025 33.8  32.0 - 36.0 g/dL Final    RDW 08/06/2025 14.6 (H)  11.5 - 14.5 % Final    Platelets 08/06/2025 164  150 - 450 x10*3/uL Final    Glucose 08/06/2025 88  74 - 99 mg/dL Final    Sodium 08/06/2025 132 (L)  136 - 145 mmol/L Final    Potassium 08/06/2025 3.7  3.5 - 5.3 mmol/L Final    Chloride 08/06/2025 104  98 - 107 mmol/L Final    Bicarbonate 08/06/2025 18 (L)  21 - 32 mmol/L Final    Anion Gap 08/06/2025 14  10 - 20 mmol/L Final    Urea Nitrogen 08/06/2025 11  6 - 23 mg/dL Final    Creatinine 08/06/2025 1.07 (H)  0.50 - 1.05 mg/dL Final    eGFR 08/06/2025 70  >60 mL/min/1.73m*2 Final    Calculations of estimated GFR are performed using the 2021 CKD-EPI Study Refit equation without the race variable for the IDMS-Traceable creatinine methods.  https://jasn.asnjournals.org/content/early/2021/09/22/ASN.4654533538    Calcium 08/06/2025 8.5 (L)  8.6 - 10.3 mg/dL Final    Albumin 08/06/2025 3.3 (L)  3.4 - 5.0 g/dL Final    Alkaline Phosphatase 08/06/2025 102  33 - 110 U/L  Final    Total Protein 2025 6.5  6.4 - 8.2 g/dL Final    AST 2025 12  9 - 39 U/L Final    Bilirubin, Total 2025 0.3  0.0 - 1.2 mg/dL Final    ALT 2025 9  7 - 45 U/L Final    Patients treated with Sulfasalazine may generate falsely decreased results for ALT.    LDH 2025 137  84 - 246 U/L Final    Uric Acid 2025 6.8 (H)  2.3 - 6.7 mg/dL Final    Venipuncture immediately after or during the administration of Metamizole may lead to falsely low results. Testing should be performed immediately  prior to Metamizole dosing.      Prenatal labs reviewed, not remarkable.  Hemoglobin   Date Value Ref Range Status   2025 12.2 12.0 - 16.0 g/dL Final     Hematocrit   Date Value Ref Range Status   2025 36.1 36.0 - 46.0 % Final     ABO TYPE   Date Value Ref Range Status   2025 O  Final          [1]   Past Medical History:  Diagnosis Date    Abnormal Pap smear of cervix     Acute medial meniscal tear 2022    Allergic     Anemia     Anxiety     Arthritis     Asthma     Depression     Encounter for full-term uncomplicated delivery      (spontaneous vaginal delivery)    Encounter for supervision of normal pregnancy, unspecified, first trimester     Encounter for pregnancy related examination in first trimester    Encounter for supervision of normal pregnancy, unspecified, first trimester     Encounter for pregnancy related examination in first trimester    GERD (gastroesophageal reflux disease)     only with pregnancy    HL (hearing loss)     Kidney stone     Knee injury 2025    Migraine     Other specified health status     No pertinent past medical history    Other specified health status 10/10/2019    No pertinent past surgical history    PCOS (polycystic ovarian syndrome)     Personal history of urinary calculi     History of kidney stones    Polycystic ovary syndrome     Unspecified infection of urinary tract in pregnancy, second trimester ([facility]) 2019     UTI (urinary tract infection) in pregnancy in second trimester    Urinary tract infection     Von Willebrand disease (Multi)     15 years ago. seen by hemotolgy 5 years ago. no longer dx   [2]   Past Surgical History:  Procedure Laterality Date    CYSTOSCOPY      LITHOTRIPSY      MENISCECTOMY Right     OTHER SURGICAL HISTORY  07/23/2020    Oral surgery    OTHER SURGICAL HISTORY  06/10/2019    Repair of bladder    WISDOM TOOTH EXTRACTION     [3]   Medications Prior to Admission   Medication Sig Dispense Refill Last Dose/Taking    acetaminophen (Tylenol) 500 mg tablet Take 2 tablets (1,000 mg) by mouth every 6 hours if needed for mild pain (1 - 3). 30 tablet 0     albuterol 1.25 mg/3 mL nebulizer solution Take 3 mL (1.25 mg) by nebulization every 6 hours if needed for wheezing. Rescue inhaler       aspirin 81 mg EC tablet Take 1 tablet (81 mg) by mouth once daily.       ondansetron ODT (Zofran-ODT) 4 mg disintegrating tablet DISSOLVE 1 TABLET IN MOUTH EVERY 8 HOURS AS NEEDED FOR NAUSEA FOR VOMITING 30 tablet 0     prenatal no115/iron/folic acid (PRENATAL 19 ORAL) Take 1 capsule by mouth once daily.

## 2025-08-06 NOTE — SIGNIFICANT EVENT
"At bedside for Team Birth huddle. Fetal heart tracing audibly in low 100s, recovery back to baseline 120s with maternal positional change and external monitor adjustment. Patient currently fatigue appearing after receiving course of Benadryl for HA that has since resolved. Magnesium and Epidural currently infusing. SVE 4/70%/-3. Discussed maternal labor preferences and plan to augment with Pitocin once appropriate.     BP (!) 84/48 Comment: Anesthesia aware of BP. en route to bedside  Pulse 64   Temp (!) 26.7 °C (80.1 °F)   Resp 20   Ht 1.575 m (5' 2.01\")   Wt 88 kg (194 lb)   LMP 11/23/2024   SpO2 (!) 95%   BMI 35.47 kg/m²     FHT: 120, minimal variability, -accel, - decel   [place]ahan: irreg ctx       A/P     PEC w/SF   - diagnosed by sustained severe range BPs requiring acute IV treatment  - s/p acute treatment with s/p lab 20/40/80 (at OSH)  - HELLP labs significant for creatinine of 1.07 and plts of 164 and P:C deferred in s/o ROM, repeat HELLP labs Cr 0.96, Plts 187  - Continue Nifedipine ER 30 mg (8/6 AM)  - 6/10 frontal HA-> Ordered Tylenol, Reglan, Benadryl, now resolved.  - Hypotensive Bps on cycled BP's, anesthesia team made aware  - Mg gtt 2g, continue to monitor for si/sxs of toxicity     PPROM  IOL  -cervical exam now 4/70%/-3, will start Pitocin once appropriate   - Epidural infusing   - Cat II tracing in s/o minimal variability, lower suspicion for acid-base disturbance given recovery of baseline without decels, suspect secondary to maternal hypotension, anesthesia to assess for treatment    D/w Dr. Chatterjee,  Jaz Cloud MD PGY4   "

## 2025-08-07 ENCOUNTER — APPOINTMENT (OUTPATIENT)
Dept: OBSTETRICS AND GYNECOLOGY | Facility: CLINIC | Age: 35
End: 2025-08-07
Payer: MEDICAID

## 2025-08-07 ENCOUNTER — APPOINTMENT (OUTPATIENT)
Dept: OBSTETRICS AND GYNECOLOGY | Facility: HOSPITAL | Age: 35
End: 2025-08-07
Payer: MEDICAID

## 2025-08-07 LAB
ALBUMIN SERPL BCP-MCNC: 3.2 G/DL (ref 3.4–5)
ALBUMIN SERPL BCP-MCNC: 3.3 G/DL (ref 3.4–5)
ALP SERPL-CCNC: 109 U/L (ref 33–110)
ALP SERPL-CCNC: 113 U/L (ref 33–110)
ALT SERPL W P-5'-P-CCNC: 10 U/L (ref 7–45)
ALT SERPL W P-5'-P-CCNC: 10 U/L (ref 7–45)
ANION GAP SERPL CALC-SCNC: 13 MMOL/L (ref 10–20)
ANION GAP SERPL CALC-SCNC: 17 MMOL/L (ref 10–20)
AST SERPL W P-5'-P-CCNC: 15 U/L (ref 9–39)
AST SERPL W P-5'-P-CCNC: 15 U/L (ref 9–39)
BASE EXCESS BLDCOA CALC-SCNC: -3.2 MMOL/L (ref -10.8–-0.5)
BASE EXCESS BLDCOV CALC-SCNC: -4 MMOL/L (ref -8.1–-0.5)
BILIRUB SERPL-MCNC: 0.3 MG/DL (ref 0–1.2)
BILIRUB SERPL-MCNC: 0.3 MG/DL (ref 0–1.2)
BODY TEMPERATURE: 37 DEGREES CELSIUS
BODY TEMPERATURE: 37 DEGREES CELSIUS
BUN SERPL-MCNC: 10 MG/DL (ref 6–23)
BUN SERPL-MCNC: 9 MG/DL (ref 6–23)
CALCIUM SERPL-MCNC: 6.5 MG/DL (ref 8.6–10.6)
CALCIUM SERPL-MCNC: 6.9 MG/DL (ref 8.6–10.6)
CHLORIDE SERPL-SCNC: 100 MMOL/L (ref 98–107)
CHLORIDE SERPL-SCNC: 103 MMOL/L (ref 98–107)
CO2 SERPL-SCNC: 17 MMOL/L (ref 21–32)
CO2 SERPL-SCNC: 20 MMOL/L (ref 21–32)
CREAT SERPL-MCNC: 1.02 MG/DL (ref 0.5–1.05)
CREAT SERPL-MCNC: 1.1 MG/DL (ref 0.5–1.05)
EGFRCR SERPLBLD CKD-EPI 2021: 67 ML/MIN/1.73M*2
EGFRCR SERPLBLD CKD-EPI 2021: 74 ML/MIN/1.73M*2
GLUCOSE SERPL-MCNC: 89 MG/DL (ref 74–99)
GLUCOSE SERPL-MCNC: 98 MG/DL (ref 74–99)
HCO3 BLDCOA-SCNC: 24.7 MMOL/L (ref 15–29)
HCO3 BLDCOV-SCNC: 19.8 MMOL/L (ref 16–26)
INHALED O2 CONCENTRATION: 21 %
INHALED O2 CONCENTRATION: 21 %
MAGNESIUM SERPL-MCNC: 4.9 MG/DL (ref 1.6–2.4)
MAGNESIUM SERPL-MCNC: 6.34 MG/DL (ref 1.6–2.4)
MAGNESIUM SERPL-MCNC: 9.64 MG/DL (ref 1.6–2.4)
OXYHGB MFR BLDCOA: 23.1 % (ref 94–98)
OXYHGB MFR BLDCOV: 69.7 % (ref 94–98)
PCO2 BLDCOA: 55 MM HG (ref 31–75)
PCO2 BLDCOV: 32 MM HG (ref 22–53)
PH BLDCOA: 7.26 PH (ref 7.08–7.39)
PH BLDCOV: 7.4 PH (ref 7.19–7.47)
PO2 BLDCOA: 15 MM HG (ref 5–31)
PO2 BLDCOV: 34 MM HG (ref 13–37)
POTASSIUM SERPL-SCNC: 4 MMOL/L (ref 3.5–5.3)
POTASSIUM SERPL-SCNC: 4.1 MMOL/L (ref 3.5–5.3)
PROT SERPL-MCNC: 5.8 G/DL (ref 6.4–8.2)
PROT SERPL-MCNC: 5.9 G/DL (ref 6.4–8.2)
SAO2 % BLDCOA: 23 % (ref 3–69)
SAO2 % BLDCOV: 72 % (ref 16–84)
SODIUM SERPL-SCNC: 130 MMOL/L (ref 136–145)
SODIUM SERPL-SCNC: 132 MMOL/L (ref 136–145)

## 2025-08-07 PROCEDURE — 2500000005 HC RX 250 GENERAL PHARMACY W/O HCPCS: Mod: SE

## 2025-08-07 PROCEDURE — 7100000016 HC LABOR RECOVERY PER HOUR

## 2025-08-07 PROCEDURE — 0503F POSTPARTUM CARE VISIT: CPT

## 2025-08-07 PROCEDURE — 82810 BLOOD GASES O2 SAT ONLY: CPT

## 2025-08-07 PROCEDURE — 80053 COMPREHEN METABOLIC PANEL: CPT

## 2025-08-07 PROCEDURE — 51702 INSERT TEMP BLADDER CATH: CPT

## 2025-08-07 PROCEDURE — 99199 UNLISTED SPECIAL SVC PX/RPRT: CPT

## 2025-08-07 PROCEDURE — 2500000004 HC RX 250 GENERAL PHARMACY W/ HCPCS (ALT 636 FOR OP/ED): Mod: SE

## 2025-08-07 PROCEDURE — 82805 BLOOD GASES W/O2 SATURATION: CPT

## 2025-08-07 PROCEDURE — 83735 ASSAY OF MAGNESIUM: CPT

## 2025-08-07 PROCEDURE — 10H07YZ INSERTION OF OTHER DEVICE INTO PRODUCTS OF CONCEPTION, VIA NATURAL OR ARTIFICIAL OPENING: ICD-10-PCS | Performed by: STUDENT IN AN ORGANIZED HEALTH CARE EDUCATION/TRAINING PROGRAM

## 2025-08-07 PROCEDURE — 93010 ELECTROCARDIOGRAM REPORT: CPT | Performed by: STUDENT IN AN ORGANIZED HEALTH CARE EDUCATION/TRAINING PROGRAM

## 2025-08-07 PROCEDURE — 84075 ASSAY ALKALINE PHOSPHATASE: CPT

## 2025-08-07 PROCEDURE — 7210000002 HC LABOR PER HOUR

## 2025-08-07 PROCEDURE — 2500000002 HC RX 250 W HCPCS SELF ADMINISTERED DRUGS (ALT 637 FOR MEDICARE OP, ALT 636 FOR OP/ED): Mod: SE

## 2025-08-07 PROCEDURE — 2720000007 HC OR 272 NO HCPCS

## 2025-08-07 PROCEDURE — 2500000004 HC RX 250 GENERAL PHARMACY W/ HCPCS (ALT 636 FOR OP/ED): Mod: SE | Performed by: ADVANCED PRACTICE MIDWIFE

## 2025-08-07 PROCEDURE — 59409 OBSTETRICAL CARE: CPT | Performed by: OBSTETRICS & GYNECOLOGY

## 2025-08-07 PROCEDURE — 1100000001 HC PRIVATE ROOM DAILY

## 2025-08-07 PROCEDURE — 2500000001 HC RX 250 WO HCPCS SELF ADMINISTERED DRUGS (ALT 637 FOR MEDICARE OP): Mod: SE

## 2025-08-07 PROCEDURE — 3E0E77Z INTRODUCTION OF ELECTROLYTIC AND WATER BALANCE SUBSTANCE INTO PRODUCTS OF CONCEPTION, VIA NATURAL OR ARTIFICIAL OPENING: ICD-10-PCS | Performed by: STUDENT IN AN ORGANIZED HEALTH CARE EDUCATION/TRAINING PROGRAM

## 2025-08-07 PROCEDURE — 59409 OBSTETRICAL CARE: CPT

## 2025-08-07 RX ORDER — HYDRALAZINE HYDROCHLORIDE 20 MG/ML
5 INJECTION INTRAMUSCULAR; INTRAVENOUS ONCE AS NEEDED
Status: DISCONTINUED | OUTPATIENT
Start: 2025-08-07 | End: 2025-08-10 | Stop reason: HOSPADM

## 2025-08-07 RX ORDER — NIFEDIPINE 30 MG/1
30 TABLET, FILM COATED, EXTENDED RELEASE ORAL ONCE
Status: COMPLETED | OUTPATIENT
Start: 2025-08-07 | End: 2025-08-07

## 2025-08-07 RX ORDER — METHYLERGONOVINE MALEATE 0.2 MG/ML
0.2 INJECTION INTRAVENOUS ONCE AS NEEDED
Status: DISCONTINUED | OUTPATIENT
Start: 2025-08-07 | End: 2025-08-10 | Stop reason: HOSPADM

## 2025-08-07 RX ORDER — SODIUM CHLORIDE 9 MG/ML
75 INJECTION, SOLUTION INTRAVENOUS CONTINUOUS
Status: ACTIVE | OUTPATIENT
Start: 2025-08-07 | End: 2025-08-08

## 2025-08-07 RX ORDER — OXYTOCIN 10 [USP'U]/ML
10 INJECTION, SOLUTION INTRAMUSCULAR; INTRAVENOUS ONCE AS NEEDED
Status: DISCONTINUED | OUTPATIENT
Start: 2025-08-07 | End: 2025-08-10 | Stop reason: HOSPADM

## 2025-08-07 RX ORDER — OXYTOCIN/0.9 % SODIUM CHLORIDE 30/500 ML
60 PLASTIC BAG, INJECTION (ML) INTRAVENOUS ONCE AS NEEDED
Status: DISCONTINUED | OUTPATIENT
Start: 2025-08-07 | End: 2025-08-10 | Stop reason: HOSPADM

## 2025-08-07 RX ORDER — SIMETHICONE 80 MG
80 TABLET,CHEWABLE ORAL 4 TIMES DAILY PRN
Status: DISCONTINUED | OUTPATIENT
Start: 2025-08-07 | End: 2025-08-10 | Stop reason: HOSPADM

## 2025-08-07 RX ORDER — ENOXAPARIN SODIUM 100 MG/ML
40 INJECTION SUBCUTANEOUS EVERY 24 HOURS
Status: DISCONTINUED | OUTPATIENT
Start: 2025-08-08 | End: 2025-08-08

## 2025-08-07 RX ORDER — DIPHENHYDRAMINE HCL 25 MG
25 CAPSULE ORAL EVERY 6 HOURS PRN
Status: DISCONTINUED | OUTPATIENT
Start: 2025-08-07 | End: 2025-08-10 | Stop reason: HOSPADM

## 2025-08-07 RX ORDER — ADHESIVE BANDAGE
10 BANDAGE TOPICAL
Status: DISCONTINUED | OUTPATIENT
Start: 2025-08-07 | End: 2025-08-07 | Stop reason: SINTOL

## 2025-08-07 RX ORDER — POLYETHYLENE GLYCOL 3350 17 G/17G
17 POWDER, FOR SOLUTION ORAL 2 TIMES DAILY PRN
Status: DISCONTINUED | OUTPATIENT
Start: 2025-08-07 | End: 2025-08-10 | Stop reason: HOSPADM

## 2025-08-07 RX ORDER — NIFEDIPINE 60 MG/1
60 TABLET, FILM COATED, EXTENDED RELEASE ORAL
Status: DISCONTINUED | OUTPATIENT
Start: 2025-08-08 | End: 2025-08-07

## 2025-08-07 RX ORDER — ACETAMINOPHEN 325 MG/1
975 TABLET ORAL EVERY 6 HOURS
Status: DISCONTINUED | OUTPATIENT
Start: 2025-08-07 | End: 2025-08-10 | Stop reason: HOSPADM

## 2025-08-07 RX ORDER — IBUPROFEN 600 MG/1
600 TABLET, FILM COATED ORAL EVERY 6 HOURS
Status: DISCONTINUED | OUTPATIENT
Start: 2025-08-07 | End: 2025-08-08

## 2025-08-07 RX ORDER — SODIUM CHLORIDE 9 MG/ML
180 INJECTION, SOLUTION INTRAVENOUS CONTINUOUS
Status: DISCONTINUED | OUTPATIENT
Start: 2025-08-07 | End: 2025-08-08

## 2025-08-07 RX ORDER — ONDANSETRON HYDROCHLORIDE 2 MG/ML
4 INJECTION, SOLUTION INTRAVENOUS EVERY 6 HOURS PRN
Status: DISCONTINUED | OUTPATIENT
Start: 2025-08-07 | End: 2025-08-10 | Stop reason: HOSPADM

## 2025-08-07 RX ORDER — LABETALOL HYDROCHLORIDE 5 MG/ML
20 INJECTION, SOLUTION INTRAVENOUS ONCE AS NEEDED
Status: DISCONTINUED | OUTPATIENT
Start: 2025-08-07 | End: 2025-08-10 | Stop reason: HOSPADM

## 2025-08-07 RX ORDER — DIPHENHYDRAMINE HYDROCHLORIDE 50 MG/ML
25 INJECTION, SOLUTION INTRAMUSCULAR; INTRAVENOUS EVERY 6 HOURS PRN
Status: DISCONTINUED | OUTPATIENT
Start: 2025-08-07 | End: 2025-08-10 | Stop reason: HOSPADM

## 2025-08-07 RX ORDER — ONDANSETRON 4 MG/1
4 TABLET, FILM COATED ORAL EVERY 6 HOURS PRN
Status: DISCONTINUED | OUTPATIENT
Start: 2025-08-07 | End: 2025-08-10 | Stop reason: HOSPADM

## 2025-08-07 RX ORDER — ENOXAPARIN SODIUM 100 MG/ML
40 INJECTION SUBCUTANEOUS EVERY 24 HOURS
Status: DISCONTINUED | OUTPATIENT
Start: 2025-08-08 | End: 2025-08-07 | Stop reason: SDUPTHER

## 2025-08-07 RX ORDER — LIDOCAINE HYDROCHLORIDE 10 MG/ML
INJECTION, SOLUTION INFILTRATION; PERINEURAL AS NEEDED
Status: DISCONTINUED | OUTPATIENT
Start: 2025-08-07 | End: 2025-08-07

## 2025-08-07 RX ORDER — NIFEDIPINE 90 MG/1
90 TABLET, EXTENDED RELEASE ORAL
Status: DISCONTINUED | OUTPATIENT
Start: 2025-08-08 | End: 2025-08-08

## 2025-08-07 RX ORDER — LOPERAMIDE HYDROCHLORIDE 2 MG/1
4 CAPSULE ORAL EVERY 2 HOUR PRN
Status: DISCONTINUED | OUTPATIENT
Start: 2025-08-07 | End: 2025-08-10 | Stop reason: HOSPADM

## 2025-08-07 RX ORDER — TRANEXAMIC ACID 1 G/10ML
1000 INJECTION, SOLUTION INTRAVENOUS ONCE AS NEEDED
Status: DISCONTINUED | OUTPATIENT
Start: 2025-08-07 | End: 2025-08-10 | Stop reason: HOSPADM

## 2025-08-07 RX ORDER — NIFEDIPINE 30 MG/1
30 TABLET, FILM COATED, EXTENDED RELEASE ORAL
Status: DISCONTINUED | OUTPATIENT
Start: 2025-08-08 | End: 2025-08-07

## 2025-08-07 RX ORDER — CARBOPROST TROMETHAMINE 250 UG/ML
250 INJECTION, SOLUTION INTRAMUSCULAR ONCE AS NEEDED
Status: DISCONTINUED | OUTPATIENT
Start: 2025-08-07 | End: 2025-08-10 | Stop reason: HOSPADM

## 2025-08-07 RX ORDER — MISOPROSTOL 200 UG/1
800 TABLET ORAL ONCE AS NEEDED
Status: DISCONTINUED | OUTPATIENT
Start: 2025-08-07 | End: 2025-08-10 | Stop reason: HOSPADM

## 2025-08-07 RX ADMIN — ONDANSETRON 4 MG: 2 INJECTION INTRAMUSCULAR; INTRAVENOUS at 22:13

## 2025-08-07 RX ADMIN — LIDOCAINE HYDROCHLORIDE 5 ML: 10 INJECTION, SOLUTION INFILTRATION; PERINEURAL at 01:40

## 2025-08-07 RX ADMIN — ACETAMINOPHEN 975 MG: 325 TABLET, FILM COATED ORAL at 17:07

## 2025-08-07 RX ADMIN — Medication 4 ML: at 14:18

## 2025-08-07 RX ADMIN — ONDANSETRON 4 MG: 2 INJECTION INTRAMUSCULAR; INTRAVENOUS at 09:34

## 2025-08-07 RX ADMIN — NIFEDIPINE 30 MG: 30 TABLET, FILM COATED, EXTENDED RELEASE ORAL at 17:30

## 2025-08-07 RX ADMIN — SODIUM CHLORIDE 500 ML: 9 INJECTION, SOLUTION INTRAVENOUS at 06:58

## 2025-08-07 RX ADMIN — IBUPROFEN 600 MG: 600 TABLET ORAL at 23:06

## 2025-08-07 RX ADMIN — CALCIUM GLUCONATE 1 G: 98 INJECTION, SOLUTION INTRAVENOUS at 01:19

## 2025-08-07 RX ADMIN — SODIUM CHLORIDE 180 ML/HR: 0.9 INJECTION, SOLUTION INTRAVENOUS at 10:09

## 2025-08-07 RX ADMIN — Medication 5 ML: at 13:04

## 2025-08-07 RX ADMIN — LIDOCAINE HYDROCHLORIDE 5 ML: 10 INJECTION, SOLUTION INFILTRATION; PERINEURAL at 01:47

## 2025-08-07 RX ADMIN — VANCOMYCIN HYDROCHLORIDE 1750 MG: 5 INJECTION, POWDER, LYOPHILIZED, FOR SOLUTION INTRAVENOUS at 06:54

## 2025-08-07 RX ADMIN — Medication 60 MILLI-UNITS/MIN: at 16:04

## 2025-08-07 RX ADMIN — SODIUM CHLORIDE 600 ML: 0.9 INJECTION, SOLUTION INTRAVENOUS at 09:09

## 2025-08-07 RX ADMIN — SODIUM CHLORIDE 75 ML/HR: 9 INJECTION, SOLUTION INTRAVENOUS at 07:28

## 2025-08-07 RX ADMIN — NIFEDIPINE 30 MG: 30 TABLET, FILM COATED, EXTENDED RELEASE ORAL at 11:40

## 2025-08-07 RX ADMIN — ACETAMINOPHEN 975 MG: 325 TABLET, FILM COATED ORAL at 23:05

## 2025-08-07 RX ADMIN — SODIUM CHLORIDE 75 ML/HR: 9 INJECTION, SOLUTION INTRAVENOUS at 04:16

## 2025-08-07 RX ADMIN — LABETALOL HYDROCHLORIDE 20 MG: 5 INJECTION, SOLUTION INTRAVENOUS at 17:18

## 2025-08-07 RX ADMIN — IBUPROFEN 600 MG: 600 TABLET ORAL at 17:07

## 2025-08-07 RX ADMIN — NIFEDIPINE 30 MG: 30 TABLET, FILM COATED, EXTENDED RELEASE ORAL at 04:37

## 2025-08-07 ASSESSMENT — PAIN DESCRIPTION - DESCRIPTORS
DESCRIPTORS: HEADACHE
DESCRIPTORS: HEADACHE
DESCRIPTORS: CRAMPING
DESCRIPTORS: HEADACHE

## 2025-08-07 ASSESSMENT — PAIN SCALES - GENERAL
PAINLEVEL_OUTOF10: 0 - NO PAIN
PAINLEVEL_OUTOF10: 3
PAINLEVEL_OUTOF10: 0 - NO PAIN
PAINLEVEL_OUTOF10: 4
PAINLEVEL_OUTOF10: 0 - NO PAIN
PAINLEVEL_OUTOF10: 2
PAINLEVEL_OUTOF10: 9
PAINLEVEL_OUTOF10: 0 - NO PAIN
PAINLEVEL_OUTOF10: 6
PAINLEVEL_OUTOF10: 0 - NO PAIN
PAINLEVEL_OUTOF10: 4
PAINLEVEL_OUTOF10: 6
PAINLEVEL_OUTOF10: 6
PAINLEVEL_OUTOF10: 0 - NO PAIN
PAINLEVEL_OUTOF10: 9
PAINLEVEL_OUTOF10: 0 - NO PAIN
PAINLEVEL_OUTOF10: 0 - NO PAIN
PAINLEVEL_OUTOF10: 2
PAINLEVEL_OUTOF10: 3
PAINLEVEL_OUTOF10: 0 - NO PAIN
PAINLEVEL_OUTOF10: 2
PAINLEVEL_OUTOF10: 2
PAINLEVEL_OUTOF10: 0 - NO PAIN
PAINLEVEL_OUTOF10: 4
PAINLEVEL_OUTOF10: 0 - NO PAIN

## 2025-08-07 NOTE — SIGNIFICANT EVENT
Significant Note     Magnesium level collected with repeat [facility] labs at 2130 due to concerns for toxicity. Pt very groggy appearing on exam with diminished reflexes as compared to prior assessment at shift change. Pt also reporting new heaviness in her upper extremities and feels its now requiring more effort to lift and move them.   Level returned 9.16. Mg was paused. EKG was performed which was within normal limits.   Will repeat Mg level 2hrs from last collection and cont to monitor patient closely.  Additionally, CMP was notable for hyponatremia (125), hyperkalemia (5.5), and elevated AST (42). Fluids switched from LR to NS. Will plan to collect repeat CMP w/ nxt Mg level.     Pt seen & d/w Dr. Vel Amato MD  OBRAN, PGY-2

## 2025-08-07 NOTE — PROGRESS NOTES
Intrapartum Progress Note    Assessment/Plan   Breanna Jackson is a 35 y.o.  at 35w4d transferred from OSH for IOL in s/o newly diagnosed sPEC and PPROM     IOL   -latent labor with change to 5/70/-2  -unable to start pit at beginning of admission due to persistent Cat II tracing in s/o maternal hypotension due to high epidural. Pitocin started at 2100, stopped for tracing and restarted at 0100  -Pit now off d/t recurrent variables. IUPC placed and amnioinfusion started. Continue position changes  -GBS unkn, pending, Vanc infusing d/t PCN allergy    sPEC  - diagnosed by sustained severe range BPs requiring acute IV treatment  - s/p acute treatment with s/p lab / (at OSH)  - HELLP labs significant for creatinine of 1.07 and plts of 164 upon admission and P:C deferred in s/o ROM, repeat HELLP labs Cr 0.96, Plts 187.   - Mg paused overnight for c/f Mg toxicity. Level returned in 9s. Ca gluconate administered. Cr 1.1 and Mg level 4.9 this AM. Will restart Mg at 1g/hr given subtherapeutic level. Will monitor closely for si/sxs of Mg toxicity  - Nifed 30 reinstated this AM after hypotension resolved    Contraception Plan: defers     Seen & Discussed with Dr. Sam Herring MD, PGY-3      Subjective   Pt doing well at this time. Feels fatigued but denies HA, CP/SOB, vision changes, RUQ pain, or new unusual swelling. Mg currently off    Objective   Last Vitals:  Temp Pulse Resp BP MAP Pulse Ox   36.6 °C (97.9 °F) 72 18 (!) 132/96 110 99 %       Physical Examination:  General Appearance: no acute distress  Lungs: normal work of breathing on RA  Heart: warm, well perfused  Abdomen: gravid  Neurologic: no gross deficits  Psych exam: normal mood and affect    CEFM: Baseline- 130 bpm, mod variability, + accels, + deep recurrent variable decels  [place]: ctx every 4-7 minutes  Cervix: 70/-2 no cord felt    Lab Review:  Lab Results   Component Value Date    WBC 7.2 2025    HGB 11.0 (L) 2025    HCT  34.3 (L) 08/06/2025     08/06/2025     Lab Results   Component Value Date    GLUCOSE 98 08/07/2025     (L) 08/07/2025    K 4.1 08/07/2025     08/07/2025    CO2 17 (L) 08/07/2025    ANIONGAP 17 08/07/2025    BUN 10 08/07/2025    CREATININE 1.10 (H) 08/07/2025    EGFR 67 08/07/2025    CALCIUM 6.9 (L) 08/07/2025    ALBUMIN 3.3 (L) 08/07/2025    PROT 5.9 (L) 08/07/2025    ALKPHOS 113 (H) 08/07/2025    ALT 10 08/07/2025    AST 15 08/07/2025    BILITOT 0.3 08/07/2025

## 2025-08-07 NOTE — SIGNIFICANT EVENT
Patient resting comfortably in bed. Amenable to replacement of IUPC, which has fallen out     Cervical Exam  Dilation: 5  Effacement (%): 80  Fetal Station: -2  Method: Manual  OB Examiner: Nevaeh TAYLOR  Fetal Assessment  Movement: Present  Mode: External US  Doppler/Fetoscope Rate: 120 BPM  Baseline Fetal Heart Rate (bpm): 120 bpm  Baseline Classification: Normal  Variability: Moderate (Between 6 and 25 BPM)  Pattern: Variable decelerations  Pattern Observations: RN at bedside repositioning pt and readjusting US monitor  Multiple Births: No      Contraction Frequency: 3-5.5    A/P:     IOL  - Latent Labor, thought exam feels changed from prior. Pit started at 2100, however, was intermittently off for multiple hours and has been continuously on since 0100 except for short period at 0830 for deep variables. Plan to recheck at 1545 to evaluate for cervical change  - Titrate pitocin per protocol  - Epidural infusing  - CEFM; Cat II currently for variable decelerations, however, overall reassuring given shallow variables and moderate variability  - GBS unknown, on Vanc for PCN allergy    Discussed with Dr. Sam Herring MD, PGY-3

## 2025-08-07 NOTE — L&D DELIVERY NOTE
Vaginal Delivery Note    Patient Name: Breanna Jackson  : 1990  MRN: [id number]  Age: 35 y.o.    /Para:   Gestational Age: 35w5d    Date of Delivery: 2025    Procedure: Normal Spontaneous Vaginal Delivery    Delivery Provider: MD Sen    Resident/Fellow/Other Assistant: MD Nevaeh    Description of Procedure:  Delivery of viable infant under epidural anesthesia. Delayed clamping was performed. The infant was placed taken to warmer. Cord gases were sent.  Cord blood was collected. Placenta delivered intact and fundus was firm    No Laceration identified.    Additional Procedures:  None    Findings:   Amniotic fluid Clear, Male infant in Vertex Occiput Anterior presentation, APGARS  ,  .  Birth Weight 2.001 kg.    Complications: None    Quantitative Blood Loss:   Delivery QBL: 0 mL (2025  3:33 PM - 2025  6:39 PM)    Blood products:      Uterotonics/Hemostatic Agent: IV Pitocin 30 units    Specimen:   Placenta  Delivered: 2025  3:39 PM  Appearance: Intact  Removal: Spontaneous    Disposition: pathology    Sponge/Instrument/Needle Counts: The sponge, lap and needle counts were correct.    Patient Disposition: Patient recovering on labor and delivery in stable condition.    Stephanie Jackson [[id number]]      Labor Events    Rupture date/time: 2025 0300  Rupture type: Spontaneous,  Prelabor  Fluid color: Clear  Fluid odor: None  Labor type: Induced Onset of Labor  Labor allowed to proceed with plans for an attempted vaginal birth?: Yes  Induction: Oxytocin  First cervical ripening date/time: 2025  Induction date/time: 2025  Induction indications: Premature ROM  Complications: None       Labor Event Times    Labor onset date/time: 2025 1000  Dilation complete date/time: 2025 1526  Start pushing date/time: 2025 15:33       Placenta    Placenta delivery date/time: 2025 15:39  Placenta removal: Spontaneous  Placenta appearance: Intact  Placenta  disposition: pathology       Cord    Vessels: 3 vessels  Complications: None       Lacerations    Episiotomy: None  Perineal laceration: None  Other lacerations?: No  Repair suture: None       Anesthesia    Method: Epidural       Operative Delivery    Forceps attempted?: No  Vacuum extractor attempted?: No       Shoulder Dystocia    Shoulder dystocia present?: No       [place] Delivery    Time head delivered: 2025 15:33:00  Birth date/time: 2025 15:33:00  Delivery type: Vaginal, Spontaneous  Complications: None       Apgars    Living status:   Apgar Component Scores:  1 min.:  5 min.:  10 min.:  15 min.:  20 min.:    Skin color:         Heart rate:         Reflex irritability:         Muscle tone:         Respiratory effort:         Total:                Delivery Providers    Delivering clinician: Albino Chatterjee MD   Provider Role    Lucero Willett RN Delivery Nurse    Rachel Arnold RN Nursery Nurse    Sindhu Herring MD Resident                Sindhu Herring MD PGY3

## 2025-08-07 NOTE — PROGRESS NOTES
Pt examined to evaluate progress.  Pt feeling much better after her Magnesium has been stopped and Calcium gluconate administered.    Cervix 4/60/-2  FHR: 120s min/mod variability. No decelerations.  TOCO:  poorly documented.  Pitocin at 8mu/min.    Reviewed progress with patient.  Pitocin started briefly yesterday but stopped and then restarted at around MN.  No additional cervical change. Will continue Pitocin. Repeat Magnesium at 4 PM.

## 2025-08-07 NOTE — SIGNIFICANT EVENT
Significant Event Note    Repeat 2hr Mg level 9.6. Calcium gluconate administered. Mg to remain paused at this time. Will collect repeat Mg and CMP in next 2-3 hours for reassessment.     D/w Dr. Vel Amato MD  [facility], PGY-2

## 2025-08-08 PROBLEM — Z34.90 TERM PREGNANCY (HHS-HCC): Status: ACTIVE | Noted: 2025-08-08

## 2025-08-08 LAB
ABO GROUP (TYPE) IN BLOOD: NORMAL
ALBUMIN SERPL BCP-MCNC: 3.1 G/DL (ref 3.4–5)
ALP SERPL-CCNC: 106 U/L (ref 33–110)
ALT SERPL W P-5'-P-CCNC: 10 U/L (ref 7–45)
ANION GAP SERPL CALC-SCNC: 18 MMOL/L (ref 10–20)
ANTIBODY SCREEN: NORMAL
AST SERPL W P-5'-P-CCNC: 21 U/L (ref 9–39)
ATRIAL RATE: 74 BPM
BILIRUB SERPL-MCNC: 0.4 MG/DL (ref 0–1.2)
BUN SERPL-MCNC: 10 MG/DL (ref 6–23)
CALCIUM SERPL-MCNC: 6.5 MG/DL (ref 8.6–10.6)
CHLORIDE SERPL-SCNC: 104 MMOL/L (ref 98–107)
CO2 SERPL-SCNC: 15 MMOL/L (ref 21–32)
CREAT SERPL-MCNC: 1.13 MG/DL (ref 0.5–1.05)
EGFRCR SERPLBLD CKD-EPI 2021: 65 ML/MIN/1.73M*2
GLUCOSE SERPL-MCNC: 86 MG/DL (ref 74–99)
GP B STREP GENITAL QL CULT: NORMAL
P AXIS: 36 DEGREES
P OFFSET: 168 MS
P ONSET: 115 MS
POTASSIUM SERPL-SCNC: 4.3 MMOL/L (ref 3.5–5.3)
PR INTERVAL: 212 MS
PROT SERPL-MCNC: 5.9 G/DL (ref 6.4–8.2)
Q ONSET: 221 MS
QRS COUNT: 12 BEATS
QRS DURATION: 82 MS
QT INTERVAL: 422 MS
QTC CALCULATION(BAZETT): 468 MS
QTC FREDERICIA: 452 MS
R AXIS: 3 DEGREES
RH FACTOR (ANTIGEN D): NORMAL
SODIUM SERPL-SCNC: 133 MMOL/L (ref 136–145)
T AXIS: 6 DEGREES
T OFFSET: 432 MS
TREPONEMA PALLIDUM IGG+IGM AB [PRESENCE] IN SERUM OR PLASMA BY IMMUNOASSAY: NONREACTIVE
VENTRICULAR RATE: 74 BPM

## 2025-08-08 PROCEDURE — 99199 UNLISTED SPECIAL SVC PX/RPRT: CPT

## 2025-08-08 PROCEDURE — 0503F POSTPARTUM CARE VISIT: CPT

## 2025-08-08 PROCEDURE — 2500000002 HC RX 250 W HCPCS SELF ADMINISTERED DRUGS (ALT 637 FOR MEDICARE OP, ALT 636 FOR OP/ED): Mod: SE

## 2025-08-08 PROCEDURE — 2500000004 HC RX 250 GENERAL PHARMACY W/ HCPCS (ALT 636 FOR OP/ED): Mod: SE

## 2025-08-08 PROCEDURE — 86850 RBC ANTIBODY SCREEN: CPT | Performed by: OBSTETRICS & GYNECOLOGY

## 2025-08-08 PROCEDURE — 86901 BLOOD TYPING SEROLOGIC RH(D): CPT | Performed by: OBSTETRICS & GYNECOLOGY

## 2025-08-08 PROCEDURE — 36415 COLL VENOUS BLD VENIPUNCTURE: CPT | Performed by: OBSTETRICS & GYNECOLOGY

## 2025-08-08 PROCEDURE — 86780 TREPONEMA PALLIDUM: CPT | Performed by: OBSTETRICS & GYNECOLOGY

## 2025-08-08 PROCEDURE — 2500000001 HC RX 250 WO HCPCS SELF ADMINISTERED DRUGS (ALT 637 FOR MEDICARE OP): Mod: SE

## 2025-08-08 PROCEDURE — 1100000001 HC PRIVATE ROOM DAILY

## 2025-08-08 RX ORDER — MISOPROSTOL 200 UG/1
800 TABLET ORAL ONCE AS NEEDED
Status: DISCONTINUED | OUTPATIENT
Start: 2025-08-08 | End: 2025-08-08

## 2025-08-08 RX ORDER — TRANEXAMIC ACID 1 G/10ML
1000 INJECTION, SOLUTION INTRAVENOUS ONCE AS NEEDED
Status: DISCONTINUED | OUTPATIENT
Start: 2025-08-08 | End: 2025-08-08

## 2025-08-08 RX ORDER — HYDRALAZINE HYDROCHLORIDE 20 MG/ML
5 INJECTION INTRAMUSCULAR; INTRAVENOUS ONCE AS NEEDED
Status: DISCONTINUED | OUTPATIENT
Start: 2025-08-08 | End: 2025-08-08

## 2025-08-08 RX ORDER — OXYTOCIN/0.9 % SODIUM CHLORIDE 30/500 ML
60 PLASTIC BAG, INJECTION (ML) INTRAVENOUS ONCE AS NEEDED
Status: DISCONTINUED | OUTPATIENT
Start: 2025-08-08 | End: 2025-08-08

## 2025-08-08 RX ORDER — TERBUTALINE SULFATE 1 MG/ML
0.25 INJECTION SUBCUTANEOUS ONCE AS NEEDED
Status: DISCONTINUED | OUTPATIENT
Start: 2025-08-08 | End: 2025-08-08

## 2025-08-08 RX ORDER — OXYTOCIN/0.9 % SODIUM CHLORIDE 30/500 ML
2-30 PLASTIC BAG, INJECTION (ML) INTRAVENOUS CONTINUOUS
Status: DISCONTINUED | OUTPATIENT
Start: 2025-08-08 | End: 2025-08-08

## 2025-08-08 RX ORDER — OXYTOCIN 10 [USP'U]/ML
10 INJECTION, SOLUTION INTRAMUSCULAR; INTRAVENOUS ONCE AS NEEDED
Status: DISCONTINUED | OUTPATIENT
Start: 2025-08-08 | End: 2025-08-08

## 2025-08-08 RX ORDER — HEPARIN SODIUM 5000 [USP'U]/ML
5000 INJECTION, SOLUTION INTRAVENOUS; SUBCUTANEOUS EVERY 8 HOURS
Status: DISCONTINUED | OUTPATIENT
Start: 2025-08-08 | End: 2025-08-10 | Stop reason: HOSPADM

## 2025-08-08 RX ORDER — ONDANSETRON HYDROCHLORIDE 2 MG/ML
4 INJECTION, SOLUTION INTRAVENOUS EVERY 6 HOURS PRN
Status: DISCONTINUED | OUTPATIENT
Start: 2025-08-08 | End: 2025-08-08

## 2025-08-08 RX ORDER — METHYLERGONOVINE MALEATE 0.2 MG/ML
0.2 INJECTION INTRAVENOUS ONCE AS NEEDED
Status: DISCONTINUED | OUTPATIENT
Start: 2025-08-08 | End: 2025-08-08

## 2025-08-08 RX ORDER — LIDOCAINE HYDROCHLORIDE 10 MG/ML
20 INJECTION, SOLUTION INFILTRATION; PERINEURAL ONCE AS NEEDED
Status: DISCONTINUED | OUTPATIENT
Start: 2025-08-08 | End: 2025-08-08

## 2025-08-08 RX ORDER — SODIUM CHLORIDE, SODIUM LACTATE, POTASSIUM CHLORIDE, CALCIUM CHLORIDE 600; 310; 30; 20 MG/100ML; MG/100ML; MG/100ML; MG/100ML
75 INJECTION, SOLUTION INTRAVENOUS CONTINUOUS
Status: DISCONTINUED | OUTPATIENT
Start: 2025-08-08 | End: 2025-08-08

## 2025-08-08 RX ORDER — NIFEDIPINE 90 MG/1
90 TABLET, EXTENDED RELEASE ORAL
Status: DISCONTINUED | OUTPATIENT
Start: 2025-08-08 | End: 2025-08-10 | Stop reason: HOSPADM

## 2025-08-08 RX ORDER — CALCIUM CARBONATE 200(500)MG
1 TABLET,CHEWABLE ORAL EVERY 6 HOURS PRN
Status: DISCONTINUED | OUTPATIENT
Start: 2025-08-08 | End: 2025-08-08

## 2025-08-08 RX ORDER — LABETALOL HYDROCHLORIDE 5 MG/ML
20 INJECTION, SOLUTION INTRAVENOUS ONCE AS NEEDED
Status: DISCONTINUED | OUTPATIENT
Start: 2025-08-08 | End: 2025-08-08

## 2025-08-08 RX ORDER — CARBOPROST TROMETHAMINE 250 UG/ML
250 INJECTION, SOLUTION INTRAMUSCULAR ONCE AS NEEDED
Status: DISCONTINUED | OUTPATIENT
Start: 2025-08-08 | End: 2025-08-08

## 2025-08-08 RX ORDER — LOPERAMIDE HYDROCHLORIDE 2 MG/1
4 CAPSULE ORAL EVERY 2 HOUR PRN
Status: DISCONTINUED | OUTPATIENT
Start: 2025-08-08 | End: 2025-08-08

## 2025-08-08 RX ORDER — ONDANSETRON 4 MG/1
4 TABLET, FILM COATED ORAL EVERY 6 HOURS PRN
Status: DISCONTINUED | OUTPATIENT
Start: 2025-08-08 | End: 2025-08-08

## 2025-08-08 RX ADMIN — MAGNESIUM SULFATE HEPTAHYDRATE 1 G/HR: 40 INJECTION, SOLUTION INTRAVENOUS at 03:44

## 2025-08-08 RX ADMIN — SODIUM CHLORIDE 75 ML/HR: 9 INJECTION, SOLUTION INTRAVENOUS at 00:14

## 2025-08-08 RX ADMIN — ACETAMINOPHEN 975 MG: 325 TABLET, FILM COATED ORAL at 05:01

## 2025-08-08 RX ADMIN — ACETAMINOPHEN 975 MG: 325 TABLET, FILM COATED ORAL at 17:32

## 2025-08-08 RX ADMIN — ACETAMINOPHEN 975 MG: 325 TABLET, FILM COATED ORAL at 11:03

## 2025-08-08 RX ADMIN — NIFEDIPINE 90 MG: 90 TABLET, FILM COATED, EXTENDED RELEASE ORAL at 06:59

## 2025-08-08 RX ADMIN — IBUPROFEN 600 MG: 600 TABLET ORAL at 05:01

## 2025-08-08 RX ADMIN — HEPARIN SODIUM 5000 UNITS: 5000 INJECTION, SOLUTION INTRAVENOUS; SUBCUTANEOUS at 11:03

## 2025-08-08 RX ADMIN — HEPARIN SODIUM 5000 UNITS: 5000 INJECTION, SOLUTION INTRAVENOUS; SUBCUTANEOUS at 17:32

## 2025-08-08 RX ADMIN — ACETAMINOPHEN 975 MG: 325 TABLET, FILM COATED ORAL at 23:49

## 2025-08-08 ASSESSMENT — PAIN SCALES - GENERAL
PAINLEVEL_OUTOF10: 0 - NO PAIN
PAINLEVEL_OUTOF10: 1

## 2025-08-08 ASSESSMENT — PAIN DESCRIPTION - DESCRIPTORS: DESCRIPTORS: SORE

## 2025-08-08 NOTE — PROGRESS NOTES
Postpartum Progress Note    Assessment/Plan   Breanna Jackson is a 35 y.o., , now PPD0 from  following IOL for PPROM and newly diagnosed sPEC     S/P  on   - Doing well, pain well controlled with PO meds, afebrile, tolerating diet  - Anti-emetics PRN and bowel regimen ordered  - Continue routine postpartum care.     sPEC  - diagnosed by sustained severe range BPs requiring acute IV treatment  - s/p acute treatment with s/p lab 20/40/80 (at OSH)  - HELLP labs significant for creatinine of 1.07 on admission and P:C deferred in s/o ROM  - Mg paused overnight for c/f Mg toxicity. Level returned in 9s. Ca gluconate administered. Cr 1.1 and Mg level 4.9 this AM. Mg restarted at 1g/hr. Most recent Cr 1.02 (@1400)  -Cont Mg at 1g/hr until 1530 (), no current si/sxs of Mg tox  -appropriate UOP  -currently on nifed 90    Additional Maternal Comorbidities  -hx asthma    -H/o obstructing nephrolithiasis s/p L ureteral stent placed 2025 (XC 2025), s/p lithotripsy 2025   -Possible von Willebrands Disease, suspected by VFW activity (38%) in  during menorrhagia work up. Factor 8, VFW antigen, and repeat activity level wnl.     Routine PP  -Feeding - to be verified   -BC-defers   -Rubella immune  -rh pos  -DVT ppx- risk score 6- Ambulation, SCDs, lovenox ppx    Pt seen & d/w Dr. Dre Amato MD  OBGYN, PGY-2     Subjective   Patient doing well. Bleeding wnl. Pain well controlled. Tolerating PO intake. Denies HA, vision changes, RUQ pain, SOB/CP. Maharaj in place.    Objective     Last Vitals:  Temp Pulse Resp BP MAP Pulse Ox   36.6 °C (97.9 °F) 67 18 136/86 106 100 %     Physical Exam:  General: no acute distress, sitting up in bed comfortably  Heart: warm and well perfused, RRR  Lungs: breathing comfortably on room air, CTAB  Abdomen: non-distended, firm   Extremities: moving all extremities  Neuro: awake and conversant  Psych: appropriate mood and affect     Lab Data:  Labs in chart were  reviewed.

## 2025-08-08 NOTE — ANESTHESIA POSTPROCEDURE EVALUATION
"Patient: Breanna Jackson \"Netta\"    Procedure Summary       Date: 08/06/25 Room / Location:     Anesthesia Start: 1340 Anesthesia Stop: 08/07/25 1533    Procedure: Labor Analgesia Diagnosis:     Scheduled Providers:  Responsible Provider: Kris Zhang MD    Anesthesia Type: epidural ASA Status: 3            Anesthesia Type: epidural    Vitals:    08/08/25 0700   BP:    Pulse:    Resp: 16   Temp:    SpO2:        Vitals shown include unfiled device data.    Anesthesia Post Evaluation    Patient location during evaluation: floor  Patient participation: complete - patient participated  Level of consciousness: awake  Pain management: adequate  Airway patency: patent  Cardiovascular status: acceptable  Respiratory status: acceptable  Hydration status: acceptable  Postoperative Nausea and Vomiting: none  Comments: Neuraxial site assessed. No visible redness or swelling or drainage. Patient still on Mg with steele in place, able to move all extremities without difficulty. No complaints of nausea/vomiting. Tolerating PO intake well. No s/sx of PDPH.          No notable events documented.    "

## 2025-08-08 NOTE — PROGRESS NOTES
08/08/25 0943   Discharge Planning   Home or Post Acute Services   (Blood Pressure Monitor)     Patient meets criteria for home monitoring of blood pressure post discharge.  Reason: current preeclampsia with severe features. Met with patient to assess for availability of home BP monitor.   Patient does not have access to BP monitor at home.  Pt agreed to order home BP monitor from [employer].   Large BP monitor delivered to room. Patient educated on importance of continuing to monitor BP at home, recording BP on home monitoring log and s/sx of when to call her provider.  Pt verbalized understanding the above information.

## 2025-08-08 NOTE — CARE PLAN
The patient's goals for the shift include rest/bond with baby    The clinical goals for the shift include BP <160/110

## 2025-08-08 NOTE — PROGRESS NOTES
Postpartum Progress Note    Assessment/Plan   Breanna Jackson is a 35 y.o., , now PPD0 from  following IOL for PPROM and newly diagnosed sPEC     S/P  on   - Doing well, pain well controlled with PO meds, afebrile, tolerating diet  - Avoiding ibuprofen given JACK  - Anti-emetics PRN and bowel regimen ordered  -Feeding: bottle feeding  -BC: defers   -DVT ppx- risk score 6- not currently ambulating (on Mag), SCDs, heparin prophylaxis given JACK  - Continue routine postpartum care.     sPEC  - diagnosed by sustained severe range BPs requiring acute IV treatment  - s/p acute treatment with s/p lab 20/40/80 (at OSH)  - HELLP labs significant for creatinine of 1.07 on admission and P:C deferred in s/o ROM  - Cr trend: 1.07 > 0.96 > 0.97 > 1.10 > 1.02 ()  - Bps mild range to normotensive  - Mg paused overnight - for c/f Mg toxicity. Level returned in 9s. Ca gluconate administered.   -Mg restarted at 8/7 AM when Mg level decreased to 4.90, cont at 1g/hr until 1530 (), no current si/sxs of Mg tox  -appropriate UOP  -currently on nifed 90    Additional Maternal Comorbidities  -hx asthma    -H/o obstructing nephrolithiasis s/p L ureteral stent placed 2025 (XC 2025), s/p lithotripsy 2025   -Possible von Willebrands Disease, suspected by VFW activity (38%) in  during menorrhagia work up. Factor 8, VFW antigen, and repeat activity level wnl.     Patient seen and discussed with Dr. Randall and Dr. Roger Arnold MD, PGY-1  Obstetrics and Gynecology     Subjective   She is doing well today. She is tolerating PO without N/V. Her pain is well controlled on PO meds. We discussed the recommendation to avoid ibuprofen and to use heparin for DVT prophylaxis given JACK. Denies HA, vision changes, CP, SOB, or RUQ pain. Denies heavy vaginal bleeding.    Objective     Last Vitals:  Temp Pulse Resp BP MAP Pulse Ox   36.5 °C (97.7 °F) 66 16 126/79 95 95 %     Physical Exam:  General: no acute distress,  sitting up in bed comfortably  Heart: warm and well perfused, RRR  Lungs: breathing comfortably on room air, CTAB  Abdomen: non-distended, soft  Fundus: firm and palpated below the umbilicus  Extremities: moving all extremities  Neuro: awake and conversant  Psych: appropriate mood and affect     Lab Data:  Labs in chart were reviewed.  Hgb 11

## 2025-08-09 LAB
ALBUMIN SERPL BCP-MCNC: 3.1 G/DL (ref 3.4–5)
ALP SERPL-CCNC: 91 U/L (ref 33–110)
ALT SERPL W P-5'-P-CCNC: 10 U/L (ref 7–45)
ANION GAP SERPL CALC-SCNC: 13 MMOL/L (ref 10–20)
ANION GAP SERPL CALC-SCNC: 15 MMOL/L (ref 10–20)
AST SERPL W P-5'-P-CCNC: 22 U/L (ref 9–39)
BILIRUB SERPL-MCNC: 0.3 MG/DL (ref 0–1.2)
BLOOD EXPIRATION DATE: NORMAL
BLOOD EXPIRATION DATE: NORMAL
BUN SERPL-MCNC: 12 MG/DL (ref 6–23)
BUN SERPL-MCNC: 13 MG/DL (ref 6–23)
CALCIUM SERPL-MCNC: 7.2 MG/DL (ref 8.6–10.6)
CALCIUM SERPL-MCNC: 8.2 MG/DL (ref 8.6–10.6)
CHLORIDE SERPL-SCNC: 100 MMOL/L (ref 98–107)
CHLORIDE SERPL-SCNC: 102 MMOL/L (ref 98–107)
CHLORIDE UR-SCNC: 76 MMOL/L
CHLORIDE/CREATININE (MMOL/G) IN URINE: 350 MMOL/G CREAT (ref 38–318)
CO2 SERPL-SCNC: 24 MMOL/L (ref 21–32)
CO2 SERPL-SCNC: 24 MMOL/L (ref 21–32)
CREAT SERPL-MCNC: 1.13 MG/DL (ref 0.5–1.05)
CREAT SERPL-MCNC: 1.18 MG/DL (ref 0.5–1.05)
CREAT UR-MCNC: 21.7 MG/DL (ref 20–320)
DISPENSE STATUS: NORMAL
DISPENSE STATUS: NORMAL
EGFRCR SERPLBLD CKD-EPI 2021: 62 ML/MIN/1.73M*2
EGFRCR SERPLBLD CKD-EPI 2021: 65 ML/MIN/1.73M*2
ERYTHROCYTE [DISTWIDTH] IN BLOOD BY AUTOMATED COUNT: 15.2 % (ref 11.5–14.5)
GLUCOSE SERPL-MCNC: 129 MG/DL (ref 74–99)
GLUCOSE SERPL-MCNC: 85 MG/DL (ref 74–99)
HCT VFR BLD AUTO: 34.5 % (ref 36–46)
HGB BLD-MCNC: 10.9 G/DL (ref 12–16)
MCH RBC QN AUTO: 27.9 PG (ref 26–34)
MCHC RBC AUTO-ENTMCNC: 31.6 G/DL (ref 32–36)
MCV RBC AUTO: 89 FL (ref 80–100)
NRBC BLD-RTO: 0 /100 WBCS (ref 0–0)
PLATELET # BLD AUTO: 179 X10*3/UL (ref 150–450)
POTASSIUM SERPL-SCNC: 3.9 MMOL/L (ref 3.5–5.3)
POTASSIUM SERPL-SCNC: 4.1 MMOL/L (ref 3.5–5.3)
POTASSIUM UR-SCNC: 11 MMOL/L
POTASSIUM/CREAT UR-RTO: 51 MMOL/G CREAT
PRODUCT BLOOD TYPE: 5100
PRODUCT BLOOD TYPE: 5100
PRODUCT CODE: NORMAL
PRODUCT CODE: NORMAL
PROT SERPL-MCNC: 6.2 G/DL (ref 6.4–8.2)
RBC # BLD AUTO: 3.9 X10*6/UL (ref 4–5.2)
SODIUM SERPL-SCNC: 135 MMOL/L (ref 136–145)
SODIUM SERPL-SCNC: 135 MMOL/L (ref 136–145)
SODIUM UR-SCNC: 77 MMOL/L
SODIUM/CREAT UR-RTO: 355 MMOL/G CREAT
UNIT ABO: NORMAL
UNIT ABO: NORMAL
UNIT NUMBER: NORMAL
UNIT NUMBER: NORMAL
UNIT RH: NORMAL
UNIT RH: NORMAL
UNIT VOLUME: 350
UNIT VOLUME: 350
WBC # BLD AUTO: 8 X10*3/UL (ref 4.4–11.3)
XM INTEP: NORMAL
XM INTEP: NORMAL

## 2025-08-09 PROCEDURE — 36415 COLL VENOUS BLD VENIPUNCTURE: CPT

## 2025-08-09 PROCEDURE — 2500000002 HC RX 250 W HCPCS SELF ADMINISTERED DRUGS (ALT 637 FOR MEDICARE OP, ALT 636 FOR OP/ED): Mod: SE

## 2025-08-09 PROCEDURE — 2500000004 HC RX 250 GENERAL PHARMACY W/ HCPCS (ALT 636 FOR OP/ED): Mod: SE

## 2025-08-09 PROCEDURE — 82436 ASSAY OF URINE CHLORIDE: CPT

## 2025-08-09 PROCEDURE — 85027 COMPLETE CBC AUTOMATED: CPT

## 2025-08-09 PROCEDURE — 84075 ASSAY ALKALINE PHOSPHATASE: CPT

## 2025-08-09 PROCEDURE — 99233 SBSQ HOSP IP/OBS HIGH 50: CPT | Performed by: STUDENT IN AN ORGANIZED HEALTH CARE EDUCATION/TRAINING PROGRAM

## 2025-08-09 PROCEDURE — 2500000001 HC RX 250 WO HCPCS SELF ADMINISTERED DRUGS (ALT 637 FOR MEDICARE OP): Mod: SE

## 2025-08-09 PROCEDURE — 82570 ASSAY OF URINE CREATININE: CPT

## 2025-08-09 PROCEDURE — 80048 BASIC METABOLIC PNL TOTAL CA: CPT | Mod: CCI

## 2025-08-09 PROCEDURE — 1100000001 HC PRIVATE ROOM DAILY

## 2025-08-09 RX ADMIN — SALINE NASAL SPRAY 1 SPRAY: 1.5 SOLUTION NASAL at 22:27

## 2025-08-09 RX ADMIN — HEPARIN SODIUM 5000 UNITS: 5000 INJECTION, SOLUTION INTRAVENOUS; SUBCUTANEOUS at 05:37

## 2025-08-09 RX ADMIN — ACETAMINOPHEN 975 MG: 325 TABLET, FILM COATED ORAL at 05:37

## 2025-08-09 RX ADMIN — SODIUM CHLORIDE, SODIUM LACTATE, POTASSIUM CHLORIDE, AND CALCIUM CHLORIDE 1000 ML: .6; .31; .03; .02 INJECTION, SOLUTION INTRAVENOUS at 09:50

## 2025-08-09 RX ADMIN — HEPARIN SODIUM 5000 UNITS: 5000 INJECTION, SOLUTION INTRAVENOUS; SUBCUTANEOUS at 14:26

## 2025-08-09 RX ADMIN — HEPARIN SODIUM 5000 UNITS: 5000 INJECTION, SOLUTION INTRAVENOUS; SUBCUTANEOUS at 22:25

## 2025-08-09 RX ADMIN — NIFEDIPINE 90 MG: 90 TABLET, FILM COATED, EXTENDED RELEASE ORAL at 08:06

## 2025-08-09 ASSESSMENT — PAIN SCALES - GENERAL
PAINLEVEL_OUTOF10: 0 - NO PAIN

## 2025-08-09 NOTE — CARE PLAN
"Problem: Postpartum  Goal: Experiences normal postpartum course  Outcome: Progressing  Goal: Appropriate maternal -  bonding  Outcome: Progressing  Goal: Establish and maintain infant feeding pattern for adequate nutrition  Outcome: Progressing  Goal: Incisions, wounds, or drain sites healing without S/S of infection  Outcome: Progressing  Goal: No s/sx infection  Outcome: Progressing  Goal: No s/sx of hemorrhage  Outcome: Progressing  Goal: Minimal s/sx of HDP and BP<160/110  Outcome: Progressing     Problem: Safety - Adult  Goal: Free from fall injury  Outcome: Progressing     Patient progressing towards all goals as planned. Pt voiding and ambulating independently. Bottle feeding. Pt in agreement with plan of care.     The patient's goals for the shift include \"to go home\".    The clinical goals for the shift include Maintain BP <160/110      "

## 2025-08-09 NOTE — PROGRESS NOTES
"Postpartum Progress Note    Assessment/Plan   Pt is a 35 y.o.yo , who delivered at 35.5wga and is now postpartum day 2 from a  cb by sPEC and intrinsic JACK.    Routine postpartum care  - patient meeting milestones appropriately   - advance diet and activity as tolerated   - pain management with  tylenol, hold ibuprofen due to intrinsic JACK, lovenox also held    sPEC   - dx by severe ranges req IV tx  - s/p lab 20  1715  - BP reg: Nifed 90 ( PM) ,normotensive to low mild range  - HELLP labs negative except for elevated creatinine, see below  - sp 24 hours of Mg    Intrinsic JACK  - notable for creatinine of 1.07 > 0.96 > 1.1 > 1.02 >> 1.18 > 1L bolus > 1.13  - FeNa 3.1  - hx of kidney stones and stent placements as recently as July in pregnancy, now asx and sp stone passage  - low suspicion for intrinsic etiology due to no risk factors and improvement with 1L bolus, suggesting prerenal, will obtain AM and can consider nephrology consult if persistently elevated    Additional Maternal Comorbidities  -hx asthma    -H/o obstructing nephrolithiasis s/p L ureteral stent placed 2025 (XC 2025), s/p lithotripsy 2025   -Possible von Willebrands Disease, suspected by VFW activity (38%) in  during menorrhagia work up. Factor 8, VFW antigen, and repeat activity level wnl.     Maternal well-being: no signs of post partum depression at this time    Марина Nance MD    Subjective   Patient doing well with no HA, no vision changes, no RUQ pain. She has no abdominal pain and very minimal spotting. She has no dysuria or lower back pain      Objective     Vitals   Visit Vitals  BP (!) 129/91   Pulse 89   Temp 36.4 °C (97.5 °F) (Temporal)   Resp 16   Ht 1.575 m (5' 2.01\")   Wt 88 kg (194 lb)   LMP 2024   SpO2 96%   Breastfeeding No   BMI 35.47 kg/m²   OB Status Recent pregnancy   Smoking Status Never   BSA 1.96 m²       Intake/Output:       Intake/Output Summary (Last 24 hours) at 2025 1745  Last " data filed at 8/9/2025 1726  Gross per 24 hour   Intake 1900 ml   Output 1280 ml   Net 620 ml           Physical Exam:  General: Examination reveals a well developed, well nourished, female, in no acute distress. She is alert and cooperative.  Lungs: clear to auscultation bilaterally.  Cardiac: regular rate and rhythm, S1, S2 normal, no murmur, click, rub or gallop.  Abdomen: nontender to palpation.  Fundus: firm and below umbilicus.  Extremities: no redness or tenderness in the calves or thighs, no edema.  Psychological: awake and alert; oriented to person, place, and time.

## 2025-08-10 ENCOUNTER — PHARMACY VISIT (OUTPATIENT)
Dept: PHARMACY | Facility: CLINIC | Age: 35
End: 2025-08-10
Payer: MEDICAID

## 2025-08-10 VITALS
TEMPERATURE: 97.9 F | RESPIRATION RATE: 18 BRPM | BODY MASS INDEX: 35.7 KG/M2 | WEIGHT: 194 LBS | DIASTOLIC BLOOD PRESSURE: 96 MMHG | HEIGHT: 62 IN | HEART RATE: 104 BPM | SYSTOLIC BLOOD PRESSURE: 142 MMHG | OXYGEN SATURATION: 97 %

## 2025-08-10 LAB
ANION GAP SERPL CALC-SCNC: 15 MMOL/L (ref 10–20)
BUN SERPL-MCNC: 15 MG/DL (ref 6–23)
CALCIUM SERPL-MCNC: 8.5 MG/DL (ref 8.6–10.6)
CHLORIDE SERPL-SCNC: 99 MMOL/L (ref 98–107)
CO2 SERPL-SCNC: 25 MMOL/L (ref 21–32)
CREAT SERPL-MCNC: 1.12 MG/DL (ref 0.5–1.05)
EGFRCR SERPLBLD CKD-EPI 2021: 66 ML/MIN/1.73M*2
GLUCOSE SERPL-MCNC: 84 MG/DL (ref 74–99)
MAGNESIUM SERPL-MCNC: 1.94 MG/DL (ref 1.6–2.4)
POTASSIUM SERPL-SCNC: 4.3 MMOL/L (ref 3.5–5.3)
SODIUM SERPL-SCNC: 135 MMOL/L (ref 136–145)

## 2025-08-10 PROCEDURE — 2500000004 HC RX 250 GENERAL PHARMACY W/ HCPCS (ALT 636 FOR OP/ED): Mod: SE

## 2025-08-10 PROCEDURE — 36415 COLL VENOUS BLD VENIPUNCTURE: CPT

## 2025-08-10 PROCEDURE — 83735 ASSAY OF MAGNESIUM: CPT

## 2025-08-10 PROCEDURE — 99238 HOSP IP/OBS DSCHRG MGMT 30/<: CPT

## 2025-08-10 PROCEDURE — RXMED WILLOW AMBULATORY MEDICATION CHARGE

## 2025-08-10 PROCEDURE — 2500000002 HC RX 250 W HCPCS SELF ADMINISTERED DRUGS (ALT 637 FOR MEDICARE OP, ALT 636 FOR OP/ED): Mod: SE

## 2025-08-10 PROCEDURE — 80048 BASIC METABOLIC PNL TOTAL CA: CPT

## 2025-08-10 RX ORDER — NIFEDIPINE 90 MG/1
90 TABLET, EXTENDED RELEASE ORAL
Qty: 60 TABLET | Refills: 0 | Status: SHIPPED | OUTPATIENT
Start: 2025-08-11

## 2025-08-10 RX ORDER — FERROUS SULFATE 325(65) MG
325 TABLET, DELAYED RELEASE (ENTERIC COATED) ORAL EVERY OTHER DAY
Qty: 30 TABLET | Refills: 3 | Status: SHIPPED | OUTPATIENT
Start: 2025-08-10 | End: 2026-08-10

## 2025-08-10 RX ORDER — LIDOCAINE HYDROCHLORIDE 20 MG/ML
1 JELLY TOPICAL ONCE
Status: DISCONTINUED | OUTPATIENT
Start: 2025-08-10 | End: 2025-08-10

## 2025-08-10 RX ORDER — PHENAZOPYRIDINE HYDROCHLORIDE 100 MG/1
95 TABLET, FILM COATED ORAL
Status: DISCONTINUED | OUTPATIENT
Start: 2025-08-10 | End: 2025-08-10

## 2025-08-10 RX ORDER — ACETAMINOPHEN 325 MG/1
975 TABLET ORAL EVERY 6 HOURS
Qty: 60 TABLET | Refills: 0 | Status: SHIPPED | OUTPATIENT
Start: 2025-08-10

## 2025-08-10 RX ADMIN — NIFEDIPINE 90 MG: 90 TABLET, FILM COATED, EXTENDED RELEASE ORAL at 06:58

## 2025-08-10 RX ADMIN — HEPARIN SODIUM 5000 UNITS: 5000 INJECTION, SOLUTION INTRAVENOUS; SUBCUTANEOUS at 06:58

## 2025-08-10 ASSESSMENT — PAIN SCALES - GENERAL
PAINLEVEL_OUTOF10: 0 - NO PAIN
PAINLEVEL_OUTOF10: 0 - NO PAIN

## 2025-08-10 NOTE — DISCHARGE SUMMARY
Discharge Summary    Admission Date: 8/6/2025  Discharge Date: 8/10/2026    Discharge Diagnosis  Pre-eclampsia, severe, antepartum, third trimester ([facility])    Hospital Course  Delivery Date: 8/7/2025 3:33 PM  Delivery type: Vaginal, Spontaneous   GA at delivery: 35w5d  Outcome: Living  Anesthesia during delivery: Epidural  Intrapartum complications: None  Feeding method: Breastfeeding Status: No     Contraception at discharge: none    Patient presented on 8/6 as a transfer from [facility] for new diagnosis of preeclampsia with severe features and PPROM at 35wga. She underwent induction of labor and had an uncomplicated vaginal delivery.   Following her delivery she was continued on magnesium for seizure prophylaxis, which was complicated by magnesium toxicity requiring calcium gluconate. She was also noted to have an intrinsic JACK, which was stable at 1.12 on day of discharge. Of note, baseline creatinine this pregnancy is 0.9 and she has a history of nephrolithiasis s/p lithotripsy in 6/2025. There was no concern for stone this admission. Given that her creatinine was stable and she was meeting  all postpartum milestones, she was discharged with plan to repeat RFP in 1 week outpatient.    Regarding her blood pressures, she was stable on nifedipine 90 and discharged on this. She was given a blood pressure cuff, preeclampsia return precautions, and instruction to follow up on 3-5 days for BP check in office and follow up with Dr. Wise. She was also discharged on PO iron for mild anemia.     Pertinent Physical Exam At Time of Discharge  General: Well appearing, alert  HEENT: normocephalic, EOMI, clear sclera  Cardio: Warm and well perfused  Resp: breathing comfortably on room air  Abd: soft, nontender, nondistended, fundus firm and below the umbilicus  Neuro: grossly intact, no focal deficits  Extremities: full ROM, no calf tenderness  Psych: A&O x3, appropriate mood and affect    Last Vitals:  Temp Pulse Resp BP MAP  Pulse Ox   36.5 °C (97.7 °F) 81 16 (!) 132/91 104 96 %     Discharge Meds     Your medication list        START taking these medications        Instructions Last Dose Given Next Dose Due   ferrous sulfate 325 (65 Fe) mg EC tablet      Take 1 tablet by mouth every other day.       NIFEdipine ER 90 mg 24 hr tablet  Commonly known as: Adalat CC  Start taking on: August 11, 2025      Take 1 tablet (90 mg) by mouth once daily in the morning. Take before meals. Do not crush, chew, or split.              CHANGE how you take these medications        Instructions Last Dose Given Next Dose Due   acetaminophen 325 mg tablet  Commonly known as: Tylenol  What changed:   medication strength  how much to take  when to take this  reasons to take this      Take 3 tablets (975 mg) by mouth every 6 hours.              CONTINUE taking these medications        Instructions Last Dose Given Next Dose Due   albuterol 1.25 mg/3 mL nebulizer solution           ondansetron ODT 4 mg disintegrating tablet  Commonly known as: Zofran-ODT      DISSOLVE 1 TABLET IN MOUTH EVERY 8 HOURS AS NEEDED FOR NAUSEA FOR VOMITING       PRENATAL 19 ORAL                  STOP taking these medications      aspirin 81 mg EC tablet                  Where to Get Your Medications        These medications were sent to [facility]  [address], [address], [place] [address]      Hours: 8AM to 6PM Mon-Fri, 8AM to 4PM Sat, 9AM to 1PM Sun Phone: [phone]   acetaminophen 325 mg tablet  ferrous sulfate 325 (65 Fe) mg EC tablet  NIFEdipine ER 90 mg 24 hr tablet          Complications Requiring Follow-Up  JACK    Test Results Pending At Discharge  Pending Labs       Order Current Status    Basic Metabolic Panel In process    Magnesium In process    Surgical Pathology Exam - PLACENTA In process            Outpatient Follow-Up  Future Appointments   Date Time Provider Department Center   1/13/2026 10:00 AM Tania De Guzman MD [address]            Ashley Randall MD

## 2025-08-10 NOTE — CARE PLAN
Problem: Postpartum  Goal: Experiences normal postpartum course  Outcome: Progressing     Problem: Postpartum  Goal: Appropriate maternal -  bonding  Outcome: Progressing     Problem: Postpartum  Goal: No s/sx of hemorrhage  Outcome: Progressing     Problem: Postpartum  Goal: Minimal s/sx of HDP and BP<160/110  Outcome: Progressing   The patient's goals for the shift include rest    The clinical goals for the shift include VSS, minimal bleeding

## 2025-08-10 NOTE — HOSPITAL COURSE
Nifed 90  Dc on PO iron  JACK poeaked at  ***  Follow uop with Dr. Wise   Recommend repeat labs

## 2025-08-10 NOTE — CARE PLAN
The patient's goals for the shift include prepare for discharge home    The clinical goals for the shift include WNL vital signs and assessments    Problem: Postpartum  Goal: Experiences normal postpartum course  Outcome: Adequate for Discharge  Goal: Appropriate maternal -  bonding  Outcome: Adequate for Discharge  Goal: Establish and maintain infant feeding pattern for adequate nutrition  Outcome: Adequate for Discharge  Goal: Incisions, wounds, or drain sites healing without S/S of infection  Outcome: Adequate for Discharge  Goal: No s/sx infection  Outcome: Adequate for Discharge  Goal: No s/sx of hemorrhage  Outcome: Adequate for Discharge  Goal: Minimal s/sx of HDP and BP<160/110  Outcome: Adequate for Discharge     Problem: Safety - Adult  Goal: Free from fall injury  Outcome: Adequate for Discharge    Vital signs stable throughout the shift, lochia has been WNL, patient is without s/s of HDP. Patient is bonding well with her !

## 2025-08-12 ENCOUNTER — ROUTINE PRENATAL (OUTPATIENT)
Dept: OBSTETRICS AND GYNECOLOGY | Facility: CLINIC | Age: 35
End: 2025-08-12
Payer: MEDICAID

## 2025-08-12 VITALS — WEIGHT: 183 LBS | DIASTOLIC BLOOD PRESSURE: 102 MMHG | BODY MASS INDEX: 33.46 KG/M2 | SYSTOLIC BLOOD PRESSURE: 154 MMHG

## 2025-08-12 DIAGNOSIS — Z87.59 HISTORY OF PRE-ECLAMPSIA: ICD-10-CM

## 2025-08-12 PROCEDURE — 99213 OFFICE O/P EST LOW 20 MIN: CPT | Performed by: OBSTETRICS & GYNECOLOGY

## 2025-08-12 RX ORDER — NIFEDIPINE 60 MG/1
60 TABLET, FILM COATED, EXTENDED RELEASE ORAL 2 TIMES DAILY
Qty: 60 TABLET | Refills: 0 | Status: SHIPPED | OUTPATIENT
Start: 2025-08-12 | End: 2025-09-11

## 2025-08-13 ENCOUNTER — APPOINTMENT (OUTPATIENT)
Dept: OBSTETRICS AND GYNECOLOGY | Facility: CLINIC | Age: 35
End: 2025-08-13
Payer: MEDICAID

## 2025-08-17 DIAGNOSIS — N17.9 AKI (ACUTE KIDNEY INJURY): ICD-10-CM

## 2025-08-21 ENCOUNTER — APPOINTMENT (OUTPATIENT)
Dept: OBSTETRICS AND GYNECOLOGY | Facility: CLINIC | Age: 35
End: 2025-08-21
Payer: MEDICAID

## 2025-08-25 LAB
LABORATORY COMMENT REPORT: NORMAL
PATH REPORT.COMMENTS IMP SPEC: NORMAL
PATH REPORT.FINAL DX SPEC: NORMAL
PATH REPORT.GROSS SPEC: NORMAL
PATH REPORT.RELEVANT HX SPEC: NORMAL
PATH REPORT.TOTAL CANCER: NORMAL

## 2025-08-26 ENCOUNTER — ROUTINE PRENATAL (OUTPATIENT)
Dept: OBSTETRICS AND GYNECOLOGY | Facility: CLINIC | Age: 35
End: 2025-08-26
Payer: MEDICAID

## 2025-08-26 ENCOUNTER — APPOINTMENT (OUTPATIENT)
Dept: OBSTETRICS AND GYNECOLOGY | Facility: CLINIC | Age: 35
End: 2025-08-26
Payer: MEDICAID

## 2025-08-26 VITALS — WEIGHT: 180 LBS | SYSTOLIC BLOOD PRESSURE: 114 MMHG | BODY MASS INDEX: 32.91 KG/M2 | DIASTOLIC BLOOD PRESSURE: 86 MMHG

## 2025-08-26 PROCEDURE — 99213 OFFICE O/P EST LOW 20 MIN: CPT | Performed by: OBSTETRICS & GYNECOLOGY

## 2025-08-27 ENCOUNTER — APPOINTMENT (OUTPATIENT)
Dept: OBSTETRICS AND GYNECOLOGY | Facility: CLINIC | Age: 35
End: 2025-08-27
Payer: MEDICAID

## 2025-08-29 ENCOUNTER — HOSPITAL ENCOUNTER (EMERGENCY)
Facility: HOSPITAL | Age: 35
Discharge: HOME | End: 2025-08-29
Payer: MEDICAID

## 2025-08-29 ENCOUNTER — APPOINTMENT (OUTPATIENT)
Dept: RADIOLOGY | Facility: HOSPITAL | Age: 35
End: 2025-08-29
Payer: MEDICAID

## 2025-08-29 ASSESSMENT — LIFESTYLE VARIABLES
EVER FELT BAD OR GUILTY ABOUT YOUR DRINKING: NO
HAVE PEOPLE ANNOYED YOU BY CRITICIZING YOUR DRINKING: NO
TOTAL SCORE: 0
HAVE YOU EVER FELT YOU SHOULD CUT DOWN ON YOUR DRINKING: NO
EVER HAD A DRINK FIRST THING IN THE MORNING TO STEADY YOUR NERVES TO GET RID OF A HANGOVER: NO

## 2025-08-29 ASSESSMENT — PAIN DESCRIPTION - LOCATION: LOCATION: KNEE

## 2025-08-29 ASSESSMENT — PAIN SCALES - GENERAL: PAINLEVEL_OUTOF10: 1

## 2025-08-29 ASSESSMENT — PAIN DESCRIPTION - PAIN TYPE: TYPE: CHRONIC PAIN

## 2025-08-29 ASSESSMENT — PAIN - FUNCTIONAL ASSESSMENT: PAIN_FUNCTIONAL_ASSESSMENT: 0-10

## 2025-08-29 ASSESSMENT — PAIN DESCRIPTION - FREQUENCY: FREQUENCY: RARELY

## 2025-09-04 ENCOUNTER — APPOINTMENT (OUTPATIENT)
Dept: OBSTETRICS AND GYNECOLOGY | Facility: CLINIC | Age: 35
End: 2025-09-04
Payer: MEDICAID

## 2025-09-05 ENCOUNTER — APPOINTMENT (OUTPATIENT)
Dept: PRIMARY CARE | Facility: CLINIC | Age: 35
End: 2025-09-05
Payer: MEDICAID

## 2025-09-05 ASSESSMENT — PATIENT HEALTH QUESTIONNAIRE - PHQ9
SUM OF ALL RESPONSES TO PHQ9 QUESTIONS 1 AND 2: 0
1. LITTLE INTEREST OR PLEASURE IN DOING THINGS: NOT AT ALL
2. FEELING DOWN, DEPRESSED OR HOPELESS: NOT AT ALL

## 2025-09-09 ENCOUNTER — APPOINTMENT (OUTPATIENT)
Dept: OBSTETRICS AND GYNECOLOGY | Facility: CLINIC | Age: 35
End: 2025-09-09
Payer: MEDICAID

## 2026-01-13 ENCOUNTER — APPOINTMENT (OUTPATIENT)
Dept: UROLOGY | Facility: CLINIC | Age: 36
End: 2026-01-13
Payer: MEDICAID

## (undated) DEVICE — FIBER, LASER, FLEXIVA PULSE 242

## (undated) DEVICE — GUIDEWIRE, ULTRA TRACK, HYBRID, 0.035 IN X 150CM

## (undated) DEVICE — TUBING, SUCTION, CONNECTING, STERILE 0.25 X 120 IN., LF

## (undated) DEVICE — Device

## (undated) DEVICE — COVER, CART, 45 X 27 X 48 IN, CLEAR

## (undated) DEVICE — GUIDEWIRE, SOLO FLEX HYBRID, .035 STRAIGHT TIP

## (undated) DEVICE — SOLUTION, INJECTION, SODIUM CHLORIDE 9%, 3000ML

## (undated) DEVICE — GLOVE, PROTEXIS PI CLASSIC, SZ-7.5, PF, LF

## (undated) DEVICE — COLLECTION BAG, FLUID, F/STERIS LOOP, STERILE

## (undated) DEVICE — SLEEVE, VASO PRESS, CALF GARMENT, MEDIUM, GREEN

## (undated) DEVICE — MANIFOLD, 4 PORT NEPTUNE STANDARD

## (undated) DEVICE — TOWEL, SURGICAL, NEURO, O/R, 16 X 26, BLUE, STERILE

## (undated) DEVICE — SOLUTION, IRRIGATION, X RX SODIUM CHL 0.9%, 1000ML BTL

## (undated) DEVICE — CATHETER, URETERAL, POLLACK, OPEN END, 5.5 FR, 70 CM

## (undated) DEVICE — SCOPE, LITHOVUE SUD ONLY, GLOBAL STANDARD